# Patient Record
Sex: MALE | Race: WHITE | NOT HISPANIC OR LATINO | URBAN - METROPOLITAN AREA
[De-identification: names, ages, dates, MRNs, and addresses within clinical notes are randomized per-mention and may not be internally consistent; named-entity substitution may affect disease eponyms.]

---

## 2017-10-19 ENCOUNTER — OUTPATIENT (OUTPATIENT)
Dept: OUTPATIENT SERVICES | Facility: HOSPITAL | Age: 67
LOS: 1 days | Discharge: HOME | End: 2017-10-19

## 2017-10-19 DIAGNOSIS — L02.91 CUTANEOUS ABSCESS, UNSPECIFIED: ICD-10-CM

## 2017-10-19 DIAGNOSIS — I48.91 UNSPECIFIED ATRIAL FIBRILLATION: ICD-10-CM

## 2017-10-26 ENCOUNTER — OUTPATIENT (OUTPATIENT)
Dept: OUTPATIENT SERVICES | Facility: HOSPITAL | Age: 67
LOS: 1 days | Discharge: HOME | End: 2017-10-26

## 2017-10-26 DIAGNOSIS — H25.89 OTHER AGE-RELATED CATARACT: ICD-10-CM

## 2017-10-26 DIAGNOSIS — Z79.01 LONG TERM (CURRENT) USE OF ANTICOAGULANTS: ICD-10-CM

## 2017-10-26 DIAGNOSIS — I10 ESSENTIAL (PRIMARY) HYPERTENSION: ICD-10-CM

## 2017-10-26 DIAGNOSIS — L02.91 CUTANEOUS ABSCESS, UNSPECIFIED: ICD-10-CM

## 2017-10-26 DIAGNOSIS — E78.00 PURE HYPERCHOLESTEROLEMIA, UNSPECIFIED: ICD-10-CM

## 2017-10-26 DIAGNOSIS — I48.91 UNSPECIFIED ATRIAL FIBRILLATION: ICD-10-CM

## 2017-10-27 DIAGNOSIS — I10 ESSENTIAL (PRIMARY) HYPERTENSION: ICD-10-CM

## 2017-10-27 DIAGNOSIS — E78.00 PURE HYPERCHOLESTEROLEMIA, UNSPECIFIED: ICD-10-CM

## 2017-10-27 DIAGNOSIS — H25.89 OTHER AGE-RELATED CATARACT: ICD-10-CM

## 2018-09-05 ENCOUNTER — OUTPATIENT (OUTPATIENT)
Dept: OUTPATIENT SERVICES | Facility: HOSPITAL | Age: 68
LOS: 1 days | Discharge: HOME | End: 2018-09-05

## 2018-09-05 DIAGNOSIS — I48.91 UNSPECIFIED ATRIAL FIBRILLATION: ICD-10-CM

## 2018-09-05 DIAGNOSIS — Z79.01 LONG TERM (CURRENT) USE OF ANTICOAGULANTS: ICD-10-CM

## 2018-09-05 LAB
POCT INR: 4.4 RATIO — HIGH (ref 0.9–1.2)
POCT PT: 52.6 SEC — HIGH (ref 10–13.4)

## 2018-09-11 ENCOUNTER — OUTPATIENT (OUTPATIENT)
Dept: OUTPATIENT SERVICES | Facility: HOSPITAL | Age: 68
LOS: 1 days | Discharge: HOME | End: 2018-09-11

## 2018-09-11 DIAGNOSIS — I48.91 UNSPECIFIED ATRIAL FIBRILLATION: ICD-10-CM

## 2018-09-11 DIAGNOSIS — Z79.01 LONG TERM (CURRENT) USE OF ANTICOAGULANTS: ICD-10-CM

## 2018-09-20 ENCOUNTER — OUTPATIENT (OUTPATIENT)
Dept: OUTPATIENT SERVICES | Facility: HOSPITAL | Age: 68
LOS: 1 days | Discharge: HOME | End: 2018-09-20

## 2018-09-20 DIAGNOSIS — Z79.01 LONG TERM (CURRENT) USE OF ANTICOAGULANTS: ICD-10-CM

## 2018-09-20 DIAGNOSIS — I48.91 UNSPECIFIED ATRIAL FIBRILLATION: ICD-10-CM

## 2018-09-20 LAB
POCT INR: 2.8 RATIO — HIGH (ref 0.9–1.2)
POCT PT: 33.8 SEC — HIGH (ref 10–13.4)

## 2018-10-06 ENCOUNTER — OUTPATIENT (OUTPATIENT)
Dept: OUTPATIENT SERVICES | Facility: HOSPITAL | Age: 68
LOS: 1 days | Discharge: HOME | End: 2018-10-06

## 2018-10-06 DIAGNOSIS — Z79.01 LONG TERM (CURRENT) USE OF ANTICOAGULANTS: ICD-10-CM

## 2018-10-06 DIAGNOSIS — I48.91 UNSPECIFIED ATRIAL FIBRILLATION: ICD-10-CM

## 2018-10-06 LAB
POCT INR: 3.2 RATIO — HIGH (ref 0.9–1.2)
POCT PT: 38.3 SEC — HIGH (ref 10–13.4)

## 2018-10-12 ENCOUNTER — OUTPATIENT (OUTPATIENT)
Dept: OUTPATIENT SERVICES | Facility: HOSPITAL | Age: 68
LOS: 1 days | Discharge: HOME | End: 2018-10-12

## 2018-10-12 DIAGNOSIS — I48.91 UNSPECIFIED ATRIAL FIBRILLATION: ICD-10-CM

## 2018-10-12 DIAGNOSIS — Z79.01 LONG TERM (CURRENT) USE OF ANTICOAGULANTS: ICD-10-CM

## 2018-10-12 LAB
POCT INR: 2.8 RATIO — HIGH (ref 0.9–1.2)
POCT PT: 33.8 SEC — HIGH (ref 10–13.4)

## 2018-10-26 ENCOUNTER — OUTPATIENT (OUTPATIENT)
Dept: OUTPATIENT SERVICES | Facility: HOSPITAL | Age: 68
LOS: 1 days | Discharge: HOME | End: 2018-10-26

## 2018-10-26 DIAGNOSIS — I48.91 UNSPECIFIED ATRIAL FIBRILLATION: ICD-10-CM

## 2018-10-26 DIAGNOSIS — Z79.01 LONG TERM (CURRENT) USE OF ANTICOAGULANTS: ICD-10-CM

## 2018-10-26 LAB
POCT INR: 2.5 RATIO — HIGH (ref 0.9–1.2)
POCT PT: 30 SEC — HIGH (ref 10–13.4)

## 2018-11-16 ENCOUNTER — OUTPATIENT (OUTPATIENT)
Dept: OUTPATIENT SERVICES | Facility: HOSPITAL | Age: 68
LOS: 1 days | Discharge: HOME | End: 2018-11-16

## 2018-11-16 DIAGNOSIS — Z79.01 LONG TERM (CURRENT) USE OF ANTICOAGULANTS: ICD-10-CM

## 2018-11-16 DIAGNOSIS — I48.91 UNSPECIFIED ATRIAL FIBRILLATION: ICD-10-CM

## 2018-11-16 LAB
POCT INR: 3.2 RATIO — HIGH (ref 0.9–1.2)
POCT PT: 38.3 SEC — HIGH (ref 10–13.4)

## 2018-11-28 ENCOUNTER — OUTPATIENT (OUTPATIENT)
Dept: OUTPATIENT SERVICES | Facility: HOSPITAL | Age: 68
LOS: 1 days | Discharge: HOME | End: 2018-11-28

## 2018-11-28 DIAGNOSIS — I48.91 UNSPECIFIED ATRIAL FIBRILLATION: ICD-10-CM

## 2018-11-28 DIAGNOSIS — Z79.01 LONG TERM (CURRENT) USE OF ANTICOAGULANTS: ICD-10-CM

## 2018-11-28 LAB
POCT INR: 1.9 RATIO — HIGH (ref 0.9–1.2)
POCT PT: 22.4 SEC — HIGH (ref 10–13.4)

## 2018-12-07 ENCOUNTER — OUTPATIENT (OUTPATIENT)
Dept: OUTPATIENT SERVICES | Facility: HOSPITAL | Age: 68
LOS: 1 days | Discharge: HOME | End: 2018-12-07

## 2018-12-07 DIAGNOSIS — I48.91 UNSPECIFIED ATRIAL FIBRILLATION: ICD-10-CM

## 2018-12-07 DIAGNOSIS — Z79.01 LONG TERM (CURRENT) USE OF ANTICOAGULANTS: ICD-10-CM

## 2018-12-07 LAB
POCT INR: 2.3 RATIO — HIGH (ref 0.9–1.2)
POCT PT: 27.8 SEC — HIGH (ref 10–13.4)

## 2018-12-28 ENCOUNTER — OUTPATIENT (OUTPATIENT)
Dept: OUTPATIENT SERVICES | Facility: HOSPITAL | Age: 68
LOS: 1 days | Discharge: HOME | End: 2018-12-28

## 2018-12-28 DIAGNOSIS — I48.91 UNSPECIFIED ATRIAL FIBRILLATION: ICD-10-CM

## 2018-12-28 DIAGNOSIS — Z79.01 LONG TERM (CURRENT) USE OF ANTICOAGULANTS: ICD-10-CM

## 2018-12-28 LAB
POCT INR: 2.4 RATIO — HIGH (ref 0.9–1.2)
POCT PT: 28.2 SEC — HIGH (ref 10–13.4)

## 2019-01-28 ENCOUNTER — OUTPATIENT (OUTPATIENT)
Dept: OUTPATIENT SERVICES | Facility: HOSPITAL | Age: 69
LOS: 1 days | Discharge: HOME | End: 2019-01-28

## 2019-01-28 DIAGNOSIS — I48.91 UNSPECIFIED ATRIAL FIBRILLATION: ICD-10-CM

## 2019-01-28 DIAGNOSIS — Z79.01 LONG TERM (CURRENT) USE OF ANTICOAGULANTS: ICD-10-CM

## 2019-01-28 LAB
POCT INR: 1.8 RATIO — HIGH (ref 0.9–1.2)
POCT PT: 20 SEC — HIGH (ref 10–13.4)

## 2019-02-08 ENCOUNTER — OUTPATIENT (OUTPATIENT)
Dept: OUTPATIENT SERVICES | Facility: HOSPITAL | Age: 69
LOS: 1 days | Discharge: HOME | End: 2019-02-08

## 2019-02-08 DIAGNOSIS — Z79.01 LONG TERM (CURRENT) USE OF ANTICOAGULANTS: ICD-10-CM

## 2019-02-08 DIAGNOSIS — I48.91 UNSPECIFIED ATRIAL FIBRILLATION: ICD-10-CM

## 2019-02-08 LAB
POCT INR: 1.6 RATIO — HIGH (ref 0.9–1.2)
POCT PT: 19.4 SEC — HIGH (ref 10–13.4)

## 2019-02-15 ENCOUNTER — OUTPATIENT (OUTPATIENT)
Dept: OUTPATIENT SERVICES | Facility: HOSPITAL | Age: 69
LOS: 1 days | Discharge: HOME | End: 2019-02-15

## 2019-02-15 DIAGNOSIS — I48.91 UNSPECIFIED ATRIAL FIBRILLATION: ICD-10-CM

## 2019-02-15 DIAGNOSIS — Z79.01 LONG TERM (CURRENT) USE OF ANTICOAGULANTS: ICD-10-CM

## 2019-02-15 LAB
POCT INR: 2.2 RATIO — HIGH (ref 0.9–1.2)
POCT PT: 26.4 SEC — HIGH (ref 10–13.4)

## 2019-03-01 ENCOUNTER — OUTPATIENT (OUTPATIENT)
Dept: OUTPATIENT SERVICES | Facility: HOSPITAL | Age: 69
LOS: 1 days | Discharge: HOME | End: 2019-03-01

## 2019-03-01 DIAGNOSIS — I48.91 UNSPECIFIED ATRIAL FIBRILLATION: ICD-10-CM

## 2019-03-01 DIAGNOSIS — Z79.01 LONG TERM (CURRENT) USE OF ANTICOAGULANTS: ICD-10-CM

## 2019-03-01 LAB
POCT INR: 2.1 RATIO — HIGH (ref 0.9–1.2)
POCT PT: 25.1 SEC — HIGH (ref 10–13.4)

## 2019-03-29 ENCOUNTER — OUTPATIENT (OUTPATIENT)
Dept: OUTPATIENT SERVICES | Facility: HOSPITAL | Age: 69
LOS: 1 days | Discharge: HOME | End: 2019-03-29

## 2019-03-29 DIAGNOSIS — I48.91 UNSPECIFIED ATRIAL FIBRILLATION: ICD-10-CM

## 2019-03-29 DIAGNOSIS — Z79.01 LONG TERM (CURRENT) USE OF ANTICOAGULANTS: ICD-10-CM

## 2019-03-29 LAB
POCT INR: 2.7 RATIO — HIGH (ref 0.9–1.2)
POCT PT: 33 SEC — HIGH (ref 10–13.4)

## 2019-04-26 ENCOUNTER — OUTPATIENT (OUTPATIENT)
Dept: OUTPATIENT SERVICES | Facility: HOSPITAL | Age: 69
LOS: 1 days | Discharge: HOME | End: 2019-04-26

## 2019-04-26 DIAGNOSIS — I48.91 UNSPECIFIED ATRIAL FIBRILLATION: ICD-10-CM

## 2019-04-26 DIAGNOSIS — Z79.01 LONG TERM (CURRENT) USE OF ANTICOAGULANTS: ICD-10-CM

## 2019-04-26 LAB
POCT INR: 2.4 RATIO — HIGH (ref 0.9–1.2)
POCT PT: 29.2 SEC — HIGH (ref 10–13.4)

## 2019-05-24 ENCOUNTER — OUTPATIENT (OUTPATIENT)
Dept: OUTPATIENT SERVICES | Facility: HOSPITAL | Age: 69
LOS: 1 days | Discharge: HOME | End: 2019-05-24

## 2019-05-24 DIAGNOSIS — I48.91 UNSPECIFIED ATRIAL FIBRILLATION: ICD-10-CM

## 2019-05-24 DIAGNOSIS — Z79.01 LONG TERM (CURRENT) USE OF ANTICOAGULANTS: ICD-10-CM

## 2019-05-24 LAB
POCT INR: 2.5 RATIO — HIGH (ref 0.9–1.2)
POCT PT: 29.5 SEC — HIGH (ref 10–13.4)

## 2019-06-21 ENCOUNTER — OUTPATIENT (OUTPATIENT)
Dept: OUTPATIENT SERVICES | Facility: HOSPITAL | Age: 69
LOS: 1 days | Discharge: HOME | End: 2019-06-21

## 2019-06-21 DIAGNOSIS — Z79.01 LONG TERM (CURRENT) USE OF ANTICOAGULANTS: ICD-10-CM

## 2019-06-21 DIAGNOSIS — I48.91 UNSPECIFIED ATRIAL FIBRILLATION: ICD-10-CM

## 2019-06-21 LAB
POCT INR: 3.4 RATIO — HIGH (ref 0.9–1.2)
POCT PT: 43.1 SEC — HIGH (ref 10–13.4)

## 2019-07-05 ENCOUNTER — OUTPATIENT (OUTPATIENT)
Dept: OUTPATIENT SERVICES | Facility: HOSPITAL | Age: 69
LOS: 1 days | Discharge: HOME | End: 2019-07-05

## 2019-07-05 DIAGNOSIS — I48.91 UNSPECIFIED ATRIAL FIBRILLATION: ICD-10-CM

## 2019-07-05 DIAGNOSIS — Z79.01 LONG TERM (CURRENT) USE OF ANTICOAGULANTS: ICD-10-CM

## 2019-07-05 LAB
POCT INR: 3.5 RATIO — HIGH (ref 0.9–1.2)
POCT PT: 41.5 SEC — HIGH (ref 10–13.4)

## 2019-07-19 ENCOUNTER — OUTPATIENT (OUTPATIENT)
Dept: OUTPATIENT SERVICES | Facility: HOSPITAL | Age: 69
LOS: 1 days | Discharge: HOME | End: 2019-07-19

## 2019-07-19 DIAGNOSIS — I48.91 UNSPECIFIED ATRIAL FIBRILLATION: ICD-10-CM

## 2019-07-19 DIAGNOSIS — Z79.01 LONG TERM (CURRENT) USE OF ANTICOAGULANTS: ICD-10-CM

## 2019-07-19 LAB
POCT INR: 2 RATIO — HIGH (ref 0.9–1.2)
POCT PT: 24 SEC — HIGH (ref 10–13.4)

## 2019-08-02 ENCOUNTER — OUTPATIENT (OUTPATIENT)
Dept: OUTPATIENT SERVICES | Facility: HOSPITAL | Age: 69
LOS: 1 days | Discharge: HOME | End: 2019-08-02

## 2019-08-02 DIAGNOSIS — I48.91 UNSPECIFIED ATRIAL FIBRILLATION: ICD-10-CM

## 2019-08-02 DIAGNOSIS — Z79.01 LONG TERM (CURRENT) USE OF ANTICOAGULANTS: ICD-10-CM

## 2019-08-02 LAB
POCT INR: 2.2 RATIO — HIGH (ref 0.9–1.2)
POCT PT: 26.5 SEC — HIGH (ref 10–13.4)

## 2019-08-30 ENCOUNTER — OUTPATIENT (OUTPATIENT)
Dept: OUTPATIENT SERVICES | Facility: HOSPITAL | Age: 69
LOS: 1 days | Discharge: HOME | End: 2019-08-30

## 2019-08-30 DIAGNOSIS — I48.91 UNSPECIFIED ATRIAL FIBRILLATION: ICD-10-CM

## 2019-08-30 DIAGNOSIS — Z79.01 LONG TERM (CURRENT) USE OF ANTICOAGULANTS: ICD-10-CM

## 2019-08-30 LAB
POCT INR: 2 RATIO — HIGH (ref 0.9–1.2)
POCT PT: 23.6 SEC — HIGH (ref 10–13.4)

## 2019-09-27 ENCOUNTER — OUTPATIENT (OUTPATIENT)
Dept: OUTPATIENT SERVICES | Facility: HOSPITAL | Age: 69
LOS: 1 days | Discharge: HOME | End: 2019-09-27

## 2019-09-27 DIAGNOSIS — I48.91 UNSPECIFIED ATRIAL FIBRILLATION: ICD-10-CM

## 2019-09-27 DIAGNOSIS — Z79.01 LONG TERM (CURRENT) USE OF ANTICOAGULANTS: ICD-10-CM

## 2019-09-27 LAB
POCT INR: 3 RATIO — HIGH (ref 0.9–1.2)
POCT PT: 36.6 SEC — HIGH (ref 10–13.4)

## 2019-10-25 ENCOUNTER — OUTPATIENT (OUTPATIENT)
Dept: OUTPATIENT SERVICES | Facility: HOSPITAL | Age: 69
LOS: 1 days | Discharge: HOME | End: 2019-10-25

## 2019-10-25 DIAGNOSIS — I48.91 UNSPECIFIED ATRIAL FIBRILLATION: ICD-10-CM

## 2019-10-25 DIAGNOSIS — Z79.01 LONG TERM (CURRENT) USE OF ANTICOAGULANTS: ICD-10-CM

## 2019-10-25 LAB
POCT INR: 2.2 RATIO — HIGH (ref 0.9–1.2)
POCT PT: 26.1 SEC — HIGH (ref 10–13.4)

## 2019-11-22 ENCOUNTER — OUTPATIENT (OUTPATIENT)
Dept: OUTPATIENT SERVICES | Facility: HOSPITAL | Age: 69
LOS: 1 days | Discharge: HOME | End: 2019-11-22

## 2019-11-22 DIAGNOSIS — Z79.01 LONG TERM (CURRENT) USE OF ANTICOAGULANTS: ICD-10-CM

## 2019-11-22 DIAGNOSIS — I48.91 UNSPECIFIED ATRIAL FIBRILLATION: ICD-10-CM

## 2019-11-25 LAB
POCT INR: 2.6 RATIO — HIGH (ref 0.9–1.2)
POCT PT: 30.2 SEC — HIGH (ref 10–13.4)

## 2019-12-20 ENCOUNTER — OUTPATIENT (OUTPATIENT)
Dept: OUTPATIENT SERVICES | Facility: HOSPITAL | Age: 69
LOS: 1 days | Discharge: HOME | End: 2019-12-20

## 2019-12-20 DIAGNOSIS — I48.91 UNSPECIFIED ATRIAL FIBRILLATION: ICD-10-CM

## 2019-12-20 DIAGNOSIS — Z79.01 LONG TERM (CURRENT) USE OF ANTICOAGULANTS: ICD-10-CM

## 2019-12-20 LAB
POCT INR: 2.5 RATIO — HIGH (ref 0.9–1.2)
POCT PT: 29.5 SEC — HIGH (ref 10–13.4)

## 2020-01-17 ENCOUNTER — OUTPATIENT (OUTPATIENT)
Dept: OUTPATIENT SERVICES | Facility: HOSPITAL | Age: 70
LOS: 1 days | Discharge: HOME | End: 2020-01-17

## 2020-01-17 DIAGNOSIS — Z79.01 LONG TERM (CURRENT) USE OF ANTICOAGULANTS: ICD-10-CM

## 2020-01-17 DIAGNOSIS — I48.91 UNSPECIFIED ATRIAL FIBRILLATION: ICD-10-CM

## 2020-01-17 LAB
POCT INR: 2.3 RATIO — HIGH (ref 0.9–1.2)
POCT PT: 27.3 SEC — HIGH (ref 10–13.4)

## 2020-02-14 ENCOUNTER — OUTPATIENT (OUTPATIENT)
Dept: OUTPATIENT SERVICES | Facility: HOSPITAL | Age: 70
LOS: 1 days | Discharge: HOME | End: 2020-02-14

## 2020-02-14 DIAGNOSIS — Z79.01 LONG TERM (CURRENT) USE OF ANTICOAGULANTS: ICD-10-CM

## 2020-02-14 DIAGNOSIS — I48.91 UNSPECIFIED ATRIAL FIBRILLATION: ICD-10-CM

## 2020-02-14 LAB
POCT INR: 2.5 RATIO — HIGH (ref 0.9–1.2)
POCT PT: 30.1 SEC — HIGH (ref 10–13.4)

## 2020-03-20 ENCOUNTER — OUTPATIENT (OUTPATIENT)
Dept: OUTPATIENT SERVICES | Facility: HOSPITAL | Age: 70
LOS: 1 days | Discharge: HOME | End: 2020-03-20

## 2020-03-20 DIAGNOSIS — Z79.01 LONG TERM (CURRENT) USE OF ANTICOAGULANTS: ICD-10-CM

## 2020-03-20 DIAGNOSIS — I48.91 UNSPECIFIED ATRIAL FIBRILLATION: ICD-10-CM

## 2020-03-20 LAB
POCT INR: 3 RATIO — HIGH (ref 0.9–1.2)
POCT PT: 35.6 SEC — HIGH (ref 10–13.4)

## 2020-04-24 ENCOUNTER — OUTPATIENT (OUTPATIENT)
Dept: OUTPATIENT SERVICES | Facility: HOSPITAL | Age: 70
LOS: 1 days | Discharge: HOME | End: 2020-04-24

## 2020-04-24 DIAGNOSIS — Z79.01 LONG TERM (CURRENT) USE OF ANTICOAGULANTS: ICD-10-CM

## 2020-04-24 DIAGNOSIS — I48.91 UNSPECIFIED ATRIAL FIBRILLATION: ICD-10-CM

## 2020-04-24 LAB
POCT INR: 2.5 RATIO — HIGH (ref 0.9–1.2)
POCT PT: 29.9 SEC — HIGH (ref 10–13.4)

## 2020-05-29 ENCOUNTER — OUTPATIENT (OUTPATIENT)
Dept: OUTPATIENT SERVICES | Facility: HOSPITAL | Age: 70
LOS: 1 days | Discharge: HOME | End: 2020-05-29

## 2020-05-29 DIAGNOSIS — Z79.01 LONG TERM (CURRENT) USE OF ANTICOAGULANTS: ICD-10-CM

## 2020-05-29 DIAGNOSIS — I48.91 UNSPECIFIED ATRIAL FIBRILLATION: ICD-10-CM

## 2020-05-29 LAB
POCT INR: 2.1 RATIO — HIGH (ref 0.9–1.2)
POCT PT: 25.6 SEC — HIGH (ref 10–13.4)

## 2020-06-26 ENCOUNTER — OUTPATIENT (OUTPATIENT)
Dept: OUTPATIENT SERVICES | Facility: HOSPITAL | Age: 70
LOS: 1 days | Discharge: HOME | End: 2020-06-26

## 2020-06-26 DIAGNOSIS — Z79.01 LONG TERM (CURRENT) USE OF ANTICOAGULANTS: ICD-10-CM

## 2020-06-26 DIAGNOSIS — I48.91 UNSPECIFIED ATRIAL FIBRILLATION: ICD-10-CM

## 2020-06-26 LAB
POCT INR: 2.1 RATIO — HIGH (ref 0.9–1.2)
POCT PT: 25.8 SEC — HIGH (ref 10–13.4)

## 2020-07-31 ENCOUNTER — OUTPATIENT (OUTPATIENT)
Dept: OUTPATIENT SERVICES | Facility: HOSPITAL | Age: 70
LOS: 1 days | Discharge: HOME | End: 2020-07-31

## 2020-07-31 DIAGNOSIS — I48.91 UNSPECIFIED ATRIAL FIBRILLATION: ICD-10-CM

## 2020-07-31 DIAGNOSIS — Z79.01 LONG TERM (CURRENT) USE OF ANTICOAGULANTS: ICD-10-CM

## 2020-07-31 LAB
POCT INR: 2.3 RATIO — HIGH (ref 0.9–1.2)
POCT PT: 27.3 SEC — HIGH (ref 10–13.4)

## 2020-08-07 ENCOUNTER — RECORD ABSTRACTING (OUTPATIENT)
Age: 70
End: 2020-08-07

## 2020-08-07 DIAGNOSIS — Z79.01 LONG TERM (CURRENT) USE OF ANTICOAGULANTS: ICD-10-CM

## 2020-08-07 DIAGNOSIS — I10 ESSENTIAL (PRIMARY) HYPERTENSION: ICD-10-CM

## 2020-08-07 DIAGNOSIS — Z87.19 PERSONAL HISTORY OF OTHER DISEASES OF THE DIGESTIVE SYSTEM: ICD-10-CM

## 2020-08-07 DIAGNOSIS — E78.00 PURE HYPERCHOLESTEROLEMIA, UNSPECIFIED: ICD-10-CM

## 2020-08-07 DIAGNOSIS — I48.91 UNSPECIFIED ATRIAL FIBRILLATION: ICD-10-CM

## 2020-08-07 DIAGNOSIS — Z86.79 PERSONAL HISTORY OF OTHER DISEASES OF THE CIRCULATORY SYSTEM: ICD-10-CM

## 2020-08-07 PROBLEM — Z00.00 ENCOUNTER FOR PREVENTIVE HEALTH EXAMINATION: Status: ACTIVE | Noted: 2020-08-07

## 2020-08-07 RX ORDER — METOPROLOL TARTRATE 100 MG/1
100 TABLET, FILM COATED ORAL
Refills: 0 | Status: ACTIVE | COMMUNITY

## 2020-08-07 RX ORDER — EZETIMIBE 10 MG/1
10 TABLET ORAL DAILY
Refills: 0 | Status: ACTIVE | COMMUNITY

## 2020-08-07 RX ORDER — SPIRONOLACTONE 50 MG/1
TABLET ORAL DAILY
Refills: 0 | Status: ACTIVE | COMMUNITY

## 2020-08-07 RX ORDER — WARFARIN 5 MG/1
5 TABLET ORAL
Refills: 0 | Status: ACTIVE | COMMUNITY

## 2020-08-07 RX ORDER — ASPIRIN 81 MG
81 TABLET, DELAYED RELEASE (ENTERIC COATED) ORAL
Refills: 0 | Status: ACTIVE | COMMUNITY

## 2020-08-07 RX ORDER — SACUBITRIL AND VALSARTAN 49; 51 MG/1; MG/1
TABLET, FILM COATED ORAL
Refills: 0 | Status: ACTIVE | COMMUNITY

## 2020-08-07 RX ORDER — ROSUVASTATIN CALCIUM 40 MG/1
40 TABLET, FILM COATED ORAL
Refills: 0 | Status: ACTIVE | COMMUNITY

## 2020-09-04 ENCOUNTER — OUTPATIENT (OUTPATIENT)
Dept: OUTPATIENT SERVICES | Facility: HOSPITAL | Age: 70
LOS: 1 days | Discharge: HOME | End: 2020-09-04

## 2020-09-04 ENCOUNTER — APPOINTMENT (OUTPATIENT)
Dept: MEDICATION MANAGEMENT | Facility: CLINIC | Age: 70
End: 2020-09-04

## 2020-09-04 VITALS — BODY MASS INDEX: 30.09 KG/M2 | HEIGHT: 75 IN | RESPIRATION RATE: 16 BRPM | WEIGHT: 242 LBS

## 2020-09-04 DIAGNOSIS — Z79.01 LONG TERM (CURRENT) USE OF ANTICOAGULANTS: ICD-10-CM

## 2020-09-04 DIAGNOSIS — Z51.81 ENCOUNTER FOR THERAPEUTIC DRUG LVL MONITORING: ICD-10-CM

## 2020-09-04 DIAGNOSIS — I48.91 UNSPECIFIED ATRIAL FIBRILLATION: ICD-10-CM

## 2020-09-04 DIAGNOSIS — Z79.01 ENCOUNTER FOR THERAPEUTIC DRUG LVL MONITORING: ICD-10-CM

## 2020-09-04 LAB
INR PPP: 1.8 RATIO
POCT-PROTHROMBIN TIME: 30 SECS
QUALITY CONTROL: YES

## 2020-10-09 ENCOUNTER — OUTPATIENT (OUTPATIENT)
Dept: OUTPATIENT SERVICES | Facility: HOSPITAL | Age: 70
LOS: 1 days | Discharge: HOME | End: 2020-10-09

## 2020-10-09 ENCOUNTER — APPOINTMENT (OUTPATIENT)
Dept: MEDICATION MANAGEMENT | Facility: CLINIC | Age: 70
End: 2020-10-09

## 2020-10-09 VITALS — HEIGHT: 75 IN | TEMPERATURE: 97 F | BODY MASS INDEX: 29.84 KG/M2 | WEIGHT: 240 LBS

## 2020-10-09 DIAGNOSIS — I48.91 UNSPECIFIED ATRIAL FIBRILLATION: ICD-10-CM

## 2020-10-09 DIAGNOSIS — Z79.01 LONG TERM (CURRENT) USE OF ANTICOAGULANTS: ICD-10-CM

## 2020-10-09 LAB
INR PPP: 2.8 RATIO
POCT-PROTHROMBIN TIME: 33.8 SECS
QUALITY CONTROL: YES

## 2020-11-13 ENCOUNTER — OUTPATIENT (OUTPATIENT)
Dept: OUTPATIENT SERVICES | Facility: HOSPITAL | Age: 70
LOS: 1 days | Discharge: HOME | End: 2020-11-13

## 2020-11-13 ENCOUNTER — APPOINTMENT (OUTPATIENT)
Dept: MEDICATION MANAGEMENT | Facility: CLINIC | Age: 70
End: 2020-11-13

## 2020-11-13 DIAGNOSIS — I48.91 UNSPECIFIED ATRIAL FIBRILLATION: ICD-10-CM

## 2020-11-13 DIAGNOSIS — Z79.01 LONG TERM (CURRENT) USE OF ANTICOAGULANTS: ICD-10-CM

## 2020-11-13 LAB
INR PPP: 2.3 RATIO
POCT-PROTHROMBIN TIME: 27.3 SECS
QUALITY CONTROL: YES

## 2020-12-18 ENCOUNTER — OUTPATIENT (OUTPATIENT)
Dept: OUTPATIENT SERVICES | Facility: HOSPITAL | Age: 70
LOS: 1 days | Discharge: HOME | End: 2020-12-18

## 2020-12-18 ENCOUNTER — APPOINTMENT (OUTPATIENT)
Dept: MEDICATION MANAGEMENT | Facility: CLINIC | Age: 70
End: 2020-12-18

## 2020-12-18 DIAGNOSIS — I48.91 UNSPECIFIED ATRIAL FIBRILLATION: ICD-10-CM

## 2020-12-18 DIAGNOSIS — Z79.01 LONG TERM (CURRENT) USE OF ANTICOAGULANTS: ICD-10-CM

## 2020-12-18 LAB
INR PPP: 2.4 RATIO
POCT-PROTHROMBIN TIME: 28.1 SECS
QUALITY CONTROL: YES

## 2021-01-22 ENCOUNTER — APPOINTMENT (OUTPATIENT)
Dept: MEDICATION MANAGEMENT | Facility: CLINIC | Age: 71
End: 2021-01-22

## 2021-01-22 ENCOUNTER — OUTPATIENT (OUTPATIENT)
Dept: OUTPATIENT SERVICES | Facility: HOSPITAL | Age: 71
LOS: 1 days | Discharge: HOME | End: 2021-01-22

## 2021-01-22 DIAGNOSIS — Z79.01 LONG TERM (CURRENT) USE OF ANTICOAGULANTS: ICD-10-CM

## 2021-01-22 DIAGNOSIS — I48.91 UNSPECIFIED ATRIAL FIBRILLATION: ICD-10-CM

## 2021-01-22 LAB
INR PPP: 2 RATIO
POCT-PROTHROMBIN TIME: 25 SECS
QUALITY CONTROL: YES

## 2021-02-26 ENCOUNTER — APPOINTMENT (OUTPATIENT)
Dept: MEDICATION MANAGEMENT | Facility: CLINIC | Age: 71
End: 2021-02-26

## 2021-02-26 ENCOUNTER — OUTPATIENT (OUTPATIENT)
Dept: OUTPATIENT SERVICES | Facility: HOSPITAL | Age: 71
LOS: 1 days | Discharge: HOME | End: 2021-02-26

## 2021-02-26 DIAGNOSIS — Z79.01 LONG TERM (CURRENT) USE OF ANTICOAGULANTS: ICD-10-CM

## 2021-02-26 DIAGNOSIS — I48.91 UNSPECIFIED ATRIAL FIBRILLATION: ICD-10-CM

## 2021-02-26 LAB
INR PPP: 2.4 RATIO
POCT-PROTHROMBIN TIME: 39.8 SECS
QUALITY CONTROL: YES

## 2021-04-02 ENCOUNTER — OUTPATIENT (OUTPATIENT)
Dept: OUTPATIENT SERVICES | Facility: HOSPITAL | Age: 71
LOS: 1 days | Discharge: HOME | End: 2021-04-02

## 2021-04-02 ENCOUNTER — APPOINTMENT (OUTPATIENT)
Dept: MEDICATION MANAGEMENT | Facility: CLINIC | Age: 71
End: 2021-04-02

## 2021-04-02 VITALS — OXYGEN SATURATION: 98 % | HEART RATE: 82 BPM | RESPIRATION RATE: 16 BRPM

## 2021-04-02 DIAGNOSIS — I48.91 UNSPECIFIED ATRIAL FIBRILLATION: ICD-10-CM

## 2021-04-02 DIAGNOSIS — Z79.01 LONG TERM (CURRENT) USE OF ANTICOAGULANTS: ICD-10-CM

## 2021-04-02 LAB
INR PPP: 2.2 RATIO
POCT-PROTHROMBIN TIME: 26.1 SECS
QUALITY CONTROL: YES

## 2021-05-06 ENCOUNTER — OUTPATIENT (OUTPATIENT)
Dept: OUTPATIENT SERVICES | Facility: HOSPITAL | Age: 71
LOS: 1 days | Discharge: HOME | End: 2021-05-06

## 2021-05-06 ENCOUNTER — APPOINTMENT (OUTPATIENT)
Dept: MEDICATION MANAGEMENT | Facility: CLINIC | Age: 71
End: 2021-05-06

## 2021-05-06 DIAGNOSIS — I48.91 UNSPECIFIED ATRIAL FIBRILLATION: ICD-10-CM

## 2021-05-06 DIAGNOSIS — Z79.01 LONG TERM (CURRENT) USE OF ANTICOAGULANTS: ICD-10-CM

## 2021-05-06 LAB
INR PPP: 2.1 RATIO
POCT-PROTHROMBIN TIME: 25.5 SECS
QUALITY CONTROL: YES

## 2021-06-11 ENCOUNTER — OUTPATIENT (OUTPATIENT)
Dept: OUTPATIENT SERVICES | Facility: HOSPITAL | Age: 71
LOS: 1 days | Discharge: HOME | End: 2021-06-11

## 2021-06-11 ENCOUNTER — APPOINTMENT (OUTPATIENT)
Dept: MEDICATION MANAGEMENT | Facility: CLINIC | Age: 71
End: 2021-06-11

## 2021-06-11 DIAGNOSIS — I48.91 UNSPECIFIED ATRIAL FIBRILLATION: ICD-10-CM

## 2021-06-11 DIAGNOSIS — Z79.01 LONG TERM (CURRENT) USE OF ANTICOAGULANTS: ICD-10-CM

## 2021-06-11 LAB
INR PPP: 2 RATIO
POCT-PROTHROMBIN TIME: 23.1 SECS
QUALITY CONTROL: YES

## 2021-07-16 ENCOUNTER — APPOINTMENT (OUTPATIENT)
Dept: MEDICATION MANAGEMENT | Facility: CLINIC | Age: 71
End: 2021-07-16

## 2021-07-16 ENCOUNTER — OUTPATIENT (OUTPATIENT)
Dept: OUTPATIENT SERVICES | Facility: HOSPITAL | Age: 71
LOS: 1 days | Discharge: HOME | End: 2021-07-16

## 2021-07-16 DIAGNOSIS — I48.91 UNSPECIFIED ATRIAL FIBRILLATION: ICD-10-CM

## 2021-07-16 DIAGNOSIS — Z79.01 LONG TERM (CURRENT) USE OF ANTICOAGULANTS: ICD-10-CM

## 2021-07-16 LAB
INR PPP: 1.8 RATIO
POCT-PROTHROMBIN TIME: 22.1 SECS
QUALITY CONTROL: YES

## 2021-07-30 ENCOUNTER — APPOINTMENT (OUTPATIENT)
Dept: MEDICATION MANAGEMENT | Facility: CLINIC | Age: 71
End: 2021-07-30

## 2021-07-30 ENCOUNTER — OUTPATIENT (OUTPATIENT)
Dept: OUTPATIENT SERVICES | Facility: HOSPITAL | Age: 71
LOS: 1 days | Discharge: HOME | End: 2021-07-30

## 2021-07-30 DIAGNOSIS — Z79.01 LONG TERM (CURRENT) USE OF ANTICOAGULANTS: ICD-10-CM

## 2021-07-30 DIAGNOSIS — I48.91 UNSPECIFIED ATRIAL FIBRILLATION: ICD-10-CM

## 2021-07-30 LAB
INR PPP: 2.2 RATIO
POCT-PROTHROMBIN TIME: 26.3 SECS
QUALITY CONTROL: YES

## 2021-08-20 ENCOUNTER — APPOINTMENT (OUTPATIENT)
Dept: MEDICATION MANAGEMENT | Facility: CLINIC | Age: 71
End: 2021-08-20

## 2021-08-20 ENCOUNTER — OUTPATIENT (OUTPATIENT)
Dept: OUTPATIENT SERVICES | Facility: HOSPITAL | Age: 71
LOS: 1 days | Discharge: HOME | End: 2021-08-20

## 2021-08-20 DIAGNOSIS — I48.91 UNSPECIFIED ATRIAL FIBRILLATION: ICD-10-CM

## 2021-08-20 DIAGNOSIS — Z79.01 LONG TERM (CURRENT) USE OF ANTICOAGULANTS: ICD-10-CM

## 2021-08-20 LAB
INR PPP: 2 RATIO
POCT-PROTHROMBIN TIME: 24 SECS
QUALITY CONTROL: YES

## 2021-09-10 ENCOUNTER — APPOINTMENT (OUTPATIENT)
Dept: MEDICATION MANAGEMENT | Facility: CLINIC | Age: 71
End: 2021-09-10

## 2021-09-10 ENCOUNTER — OUTPATIENT (OUTPATIENT)
Dept: OUTPATIENT SERVICES | Facility: HOSPITAL | Age: 71
LOS: 1 days | Discharge: HOME | End: 2021-09-10

## 2021-09-10 DIAGNOSIS — I48.91 UNSPECIFIED ATRIAL FIBRILLATION: ICD-10-CM

## 2021-09-10 DIAGNOSIS — Z79.01 LONG TERM (CURRENT) USE OF ANTICOAGULANTS: ICD-10-CM

## 2021-09-10 LAB
INR PPP: 2.6 RATIO
POCT-PROTHROMBIN TIME: 31.3 SECS
QUALITY CONTROL: YES

## 2021-09-24 ENCOUNTER — APPOINTMENT (OUTPATIENT)
Dept: MEDICATION MANAGEMENT | Facility: CLINIC | Age: 71
End: 2021-09-24

## 2021-09-24 ENCOUNTER — OUTPATIENT (OUTPATIENT)
Dept: OUTPATIENT SERVICES | Facility: HOSPITAL | Age: 71
LOS: 1 days | Discharge: HOME | End: 2021-09-24

## 2021-09-24 DIAGNOSIS — Z79.01 LONG TERM (CURRENT) USE OF ANTICOAGULANTS: ICD-10-CM

## 2021-09-24 DIAGNOSIS — I48.91 UNSPECIFIED ATRIAL FIBRILLATION: ICD-10-CM

## 2021-09-24 LAB
INR PPP: 2.5 RATIO
POCT-PROTHROMBIN TIME: 30.3 SECS
QUALITY CONTROL: YES

## 2021-10-22 ENCOUNTER — OUTPATIENT (OUTPATIENT)
Dept: OUTPATIENT SERVICES | Facility: HOSPITAL | Age: 71
LOS: 1 days | Discharge: HOME | End: 2021-10-22

## 2021-10-22 ENCOUNTER — APPOINTMENT (OUTPATIENT)
Dept: MEDICATION MANAGEMENT | Facility: CLINIC | Age: 71
End: 2021-10-22

## 2021-10-22 DIAGNOSIS — Z79.01 LONG TERM (CURRENT) USE OF ANTICOAGULANTS: ICD-10-CM

## 2021-10-22 DIAGNOSIS — I48.91 UNSPECIFIED ATRIAL FIBRILLATION: ICD-10-CM

## 2021-10-22 LAB
INR PPP: 2.2 RATIO
POCT-PROTHROMBIN TIME: 26.2 SECS
QUALITY CONTROL: YES

## 2021-11-18 ENCOUNTER — APPOINTMENT (OUTPATIENT)
Dept: MEDICATION MANAGEMENT | Facility: CLINIC | Age: 71
End: 2021-11-18

## 2021-11-18 ENCOUNTER — OUTPATIENT (OUTPATIENT)
Dept: OUTPATIENT SERVICES | Facility: HOSPITAL | Age: 71
LOS: 1 days | Discharge: HOME | End: 2021-11-18

## 2021-11-18 DIAGNOSIS — Z79.01 LONG TERM (CURRENT) USE OF ANTICOAGULANTS: ICD-10-CM

## 2021-11-18 DIAGNOSIS — I48.91 UNSPECIFIED ATRIAL FIBRILLATION: ICD-10-CM

## 2021-11-18 LAB
INR PPP: 2 RATIO
POCT-PROTHROMBIN TIME: 23.5 SECS
QUALITY CONTROL: YES

## 2021-12-17 ENCOUNTER — APPOINTMENT (OUTPATIENT)
Dept: MEDICATION MANAGEMENT | Facility: CLINIC | Age: 71
End: 2021-12-17

## 2021-12-17 ENCOUNTER — OUTPATIENT (OUTPATIENT)
Dept: OUTPATIENT SERVICES | Facility: HOSPITAL | Age: 71
LOS: 1 days | Discharge: HOME | End: 2021-12-17

## 2021-12-17 DIAGNOSIS — I48.91 UNSPECIFIED ATRIAL FIBRILLATION: ICD-10-CM

## 2021-12-17 DIAGNOSIS — Z79.01 LONG TERM (CURRENT) USE OF ANTICOAGULANTS: ICD-10-CM

## 2021-12-17 LAB
INR PPP: 2 RATIO
POCT-PROTHROMBIN TIME: 24.4 SECS
QUALITY CONTROL: YES

## 2022-01-14 ENCOUNTER — APPOINTMENT (OUTPATIENT)
Dept: MEDICATION MANAGEMENT | Facility: CLINIC | Age: 72
End: 2022-01-14

## 2022-01-14 ENCOUNTER — OUTPATIENT (OUTPATIENT)
Dept: OUTPATIENT SERVICES | Facility: HOSPITAL | Age: 72
LOS: 1 days | Discharge: HOME | End: 2022-01-14

## 2022-01-14 DIAGNOSIS — Z79.01 LONG TERM (CURRENT) USE OF ANTICOAGULANTS: ICD-10-CM

## 2022-01-14 DIAGNOSIS — I48.91 UNSPECIFIED ATRIAL FIBRILLATION: ICD-10-CM

## 2022-01-14 LAB
INR PPP: 2.2 RATIO
POCT-PROTHROMBIN TIME: 27 SECS
QUALITY CONTROL: YES

## 2022-01-18 ENCOUNTER — RX RENEWAL (OUTPATIENT)
Age: 72
End: 2022-01-18

## 2022-02-18 ENCOUNTER — APPOINTMENT (OUTPATIENT)
Dept: MEDICATION MANAGEMENT | Facility: CLINIC | Age: 72
End: 2022-02-18

## 2022-02-18 ENCOUNTER — OUTPATIENT (OUTPATIENT)
Dept: OUTPATIENT SERVICES | Facility: HOSPITAL | Age: 72
LOS: 1 days | Discharge: HOME | End: 2022-02-18

## 2022-02-18 DIAGNOSIS — I48.91 UNSPECIFIED ATRIAL FIBRILLATION: ICD-10-CM

## 2022-02-18 DIAGNOSIS — Z79.01 LONG TERM (CURRENT) USE OF ANTICOAGULANTS: ICD-10-CM

## 2022-02-18 LAB
INR PPP: 2.2 RATIO
POCT-PROTHROMBIN TIME: 26.8 SECS
QUALITY CONTROL: YES

## 2022-03-18 ENCOUNTER — OUTPATIENT (OUTPATIENT)
Dept: OUTPATIENT SERVICES | Facility: HOSPITAL | Age: 72
LOS: 1 days | Discharge: HOME | End: 2022-03-18

## 2022-03-18 ENCOUNTER — APPOINTMENT (OUTPATIENT)
Dept: MEDICATION MANAGEMENT | Facility: CLINIC | Age: 72
End: 2022-03-18

## 2022-03-18 DIAGNOSIS — Z79.01 LONG TERM (CURRENT) USE OF ANTICOAGULANTS: ICD-10-CM

## 2022-03-18 DIAGNOSIS — I48.91 UNSPECIFIED ATRIAL FIBRILLATION: ICD-10-CM

## 2022-03-18 LAB
INR PPP: 2.5 RATIO
POCT-PROTHROMBIN TIME: 30.3 SECS
QUALITY CONTROL: YES

## 2022-04-22 ENCOUNTER — OUTPATIENT (OUTPATIENT)
Dept: OUTPATIENT SERVICES | Facility: HOSPITAL | Age: 72
LOS: 1 days | Discharge: HOME | End: 2022-04-22

## 2022-04-22 ENCOUNTER — APPOINTMENT (OUTPATIENT)
Dept: MEDICATION MANAGEMENT | Facility: CLINIC | Age: 72
End: 2022-04-22

## 2022-04-22 DIAGNOSIS — Z79.01 LONG TERM (CURRENT) USE OF ANTICOAGULANTS: ICD-10-CM

## 2022-04-22 DIAGNOSIS — I48.91 UNSPECIFIED ATRIAL FIBRILLATION: ICD-10-CM

## 2022-04-22 LAB
INR PPP: 2 RATIO
POCT-PROTHROMBIN TIME: 23.2 SECS
QUALITY CONTROL: YES

## 2022-05-27 ENCOUNTER — APPOINTMENT (OUTPATIENT)
Dept: MEDICATION MANAGEMENT | Facility: CLINIC | Age: 72
End: 2022-05-27

## 2022-05-27 ENCOUNTER — OUTPATIENT (OUTPATIENT)
Dept: OUTPATIENT SERVICES | Facility: HOSPITAL | Age: 72
LOS: 1 days | Discharge: HOME | End: 2022-05-27

## 2022-05-27 DIAGNOSIS — Z79.01 LONG TERM (CURRENT) USE OF ANTICOAGULANTS: ICD-10-CM

## 2022-05-27 DIAGNOSIS — I48.91 UNSPECIFIED ATRIAL FIBRILLATION: ICD-10-CM

## 2022-05-27 LAB
INR PPP: 2.3 RATIO
POCT-PROTHROMBIN TIME: 28.1 SECS
QUALITY CONTROL: YES

## 2022-07-01 ENCOUNTER — APPOINTMENT (OUTPATIENT)
Dept: MEDICATION MANAGEMENT | Facility: CLINIC | Age: 72
End: 2022-07-01

## 2022-07-01 ENCOUNTER — OUTPATIENT (OUTPATIENT)
Dept: OUTPATIENT SERVICES | Facility: HOSPITAL | Age: 72
LOS: 1 days | Discharge: HOME | End: 2022-07-01

## 2022-07-01 DIAGNOSIS — Z79.01 LONG TERM (CURRENT) USE OF ANTICOAGULANTS: ICD-10-CM

## 2022-07-01 DIAGNOSIS — I48.91 UNSPECIFIED ATRIAL FIBRILLATION: ICD-10-CM

## 2022-07-01 LAB
INR PPP: 2.1 RATIO
POCT-PROTHROMBIN TIME: 25.5 SECS
QUALITY CONTROL: YES

## 2022-08-05 ENCOUNTER — OUTPATIENT (OUTPATIENT)
Dept: OUTPATIENT SERVICES | Facility: HOSPITAL | Age: 72
LOS: 1 days | Discharge: HOME | End: 2022-08-05

## 2022-08-05 ENCOUNTER — APPOINTMENT (OUTPATIENT)
Dept: MEDICATION MANAGEMENT | Facility: CLINIC | Age: 72
End: 2022-08-05

## 2022-08-05 DIAGNOSIS — I48.91 UNSPECIFIED ATRIAL FIBRILLATION: ICD-10-CM

## 2022-08-05 DIAGNOSIS — Z79.01 LONG TERM (CURRENT) USE OF ANTICOAGULANTS: ICD-10-CM

## 2022-09-09 ENCOUNTER — OUTPATIENT (OUTPATIENT)
Dept: OUTPATIENT SERVICES | Facility: HOSPITAL | Age: 72
LOS: 1 days | Discharge: HOME | End: 2022-09-09

## 2022-09-09 ENCOUNTER — APPOINTMENT (OUTPATIENT)
Dept: MEDICATION MANAGEMENT | Facility: CLINIC | Age: 72
End: 2022-09-09

## 2022-09-09 DIAGNOSIS — I48.91 UNSPECIFIED ATRIAL FIBRILLATION: ICD-10-CM

## 2022-09-09 DIAGNOSIS — Z79.01 LONG TERM (CURRENT) USE OF ANTICOAGULANTS: ICD-10-CM

## 2022-10-14 ENCOUNTER — OUTPATIENT (OUTPATIENT)
Dept: OUTPATIENT SERVICES | Facility: HOSPITAL | Age: 72
LOS: 1 days | Discharge: HOME | End: 2022-10-14

## 2022-10-14 ENCOUNTER — APPOINTMENT (OUTPATIENT)
Dept: MEDICATION MANAGEMENT | Facility: CLINIC | Age: 72
End: 2022-10-14

## 2022-10-14 DIAGNOSIS — Z79.01 LONG TERM (CURRENT) USE OF ANTICOAGULANTS: ICD-10-CM

## 2022-10-14 DIAGNOSIS — I48.91 UNSPECIFIED ATRIAL FIBRILLATION: ICD-10-CM

## 2022-10-14 LAB
INR PPP: 2.5 RATIO
POCT-PROTHROMBIN TIME: 30.1 SECS
QUALITY CONTROL: YES

## 2022-11-16 ENCOUNTER — RX RENEWAL (OUTPATIENT)
Age: 72
End: 2022-11-16

## 2022-11-18 ENCOUNTER — OUTPATIENT (OUTPATIENT)
Dept: OUTPATIENT SERVICES | Facility: HOSPITAL | Age: 72
LOS: 1 days | Discharge: HOME | End: 2022-11-18

## 2022-11-18 ENCOUNTER — APPOINTMENT (OUTPATIENT)
Dept: MEDICATION MANAGEMENT | Facility: CLINIC | Age: 72
End: 2022-11-18

## 2022-11-18 DIAGNOSIS — I48.91 UNSPECIFIED ATRIAL FIBRILLATION: ICD-10-CM

## 2022-11-18 DIAGNOSIS — Z79.01 LONG TERM (CURRENT) USE OF ANTICOAGULANTS: ICD-10-CM

## 2022-12-16 ENCOUNTER — OUTPATIENT (OUTPATIENT)
Dept: OUTPATIENT SERVICES | Facility: HOSPITAL | Age: 72
LOS: 1 days | Discharge: HOME | End: 2022-12-16

## 2022-12-16 ENCOUNTER — APPOINTMENT (OUTPATIENT)
Dept: MEDICATION MANAGEMENT | Facility: CLINIC | Age: 72
End: 2022-12-16

## 2022-12-16 DIAGNOSIS — Z79.01 LONG TERM (CURRENT) USE OF ANTICOAGULANTS: ICD-10-CM

## 2022-12-16 DIAGNOSIS — I48.91 UNSPECIFIED ATRIAL FIBRILLATION: ICD-10-CM

## 2023-01-20 ENCOUNTER — APPOINTMENT (OUTPATIENT)
Dept: MEDICATION MANAGEMENT | Facility: CLINIC | Age: 73
End: 2023-01-20

## 2023-01-20 ENCOUNTER — OUTPATIENT (OUTPATIENT)
Dept: OUTPATIENT SERVICES | Facility: HOSPITAL | Age: 73
LOS: 1 days | Discharge: HOME | End: 2023-01-20

## 2023-01-20 DIAGNOSIS — Z79.01 LONG TERM (CURRENT) USE OF ANTICOAGULANTS: ICD-10-CM

## 2023-01-20 DIAGNOSIS — I48.91 UNSPECIFIED ATRIAL FIBRILLATION: ICD-10-CM

## 2023-01-20 LAB
INR PPP: 2.6 RATIO
POCT-PROTHROMBIN TIME: 31.5 SECS
QUALITY CONTROL: YES

## 2023-02-24 ENCOUNTER — OUTPATIENT (OUTPATIENT)
Dept: OUTPATIENT SERVICES | Facility: HOSPITAL | Age: 73
LOS: 1 days | End: 2023-02-24
Payer: MEDICARE

## 2023-02-24 ENCOUNTER — APPOINTMENT (OUTPATIENT)
Dept: MEDICATION MANAGEMENT | Facility: CLINIC | Age: 73
End: 2023-02-24

## 2023-02-24 DIAGNOSIS — Z79.01 LONG TERM (CURRENT) USE OF ANTICOAGULANTS: ICD-10-CM

## 2023-02-24 LAB
INR PPP: 2.4 RATIO
POCT-PROTHROMBIN TIME: 28.5 SECS
QUALITY CONTROL: YES

## 2023-02-24 PROCEDURE — 85610 PROTHROMBIN TIME: CPT

## 2023-02-24 PROCEDURE — 99211 OFF/OP EST MAY X REQ PHY/QHP: CPT

## 2023-02-24 PROCEDURE — 36416 COLLJ CAPILLARY BLOOD SPEC: CPT

## 2023-02-25 DIAGNOSIS — Z79.01 LONG TERM (CURRENT) USE OF ANTICOAGULANTS: ICD-10-CM

## 2023-03-31 ENCOUNTER — APPOINTMENT (OUTPATIENT)
Dept: MEDICATION MANAGEMENT | Facility: CLINIC | Age: 73
End: 2023-03-31

## 2023-03-31 ENCOUNTER — OUTPATIENT (OUTPATIENT)
Dept: OUTPATIENT SERVICES | Facility: HOSPITAL | Age: 73
LOS: 1 days | End: 2023-03-31
Payer: MEDICARE

## 2023-03-31 DIAGNOSIS — Z79.01 LONG TERM (CURRENT) USE OF ANTICOAGULANTS: ICD-10-CM

## 2023-03-31 DIAGNOSIS — I48.91 UNSPECIFIED ATRIAL FIBRILLATION: ICD-10-CM

## 2023-03-31 LAB
INR PPP: 2.2
PT BLD: 26.3

## 2023-03-31 PROCEDURE — 85610 PROTHROMBIN TIME: CPT

## 2023-03-31 PROCEDURE — 99211 OFF/OP EST MAY X REQ PHY/QHP: CPT

## 2023-03-31 PROCEDURE — 36416 COLLJ CAPILLARY BLOOD SPEC: CPT

## 2023-04-01 DIAGNOSIS — I48.91 UNSPECIFIED ATRIAL FIBRILLATION: ICD-10-CM

## 2023-04-01 DIAGNOSIS — Z79.01 LONG TERM (CURRENT) USE OF ANTICOAGULANTS: ICD-10-CM

## 2023-05-12 ENCOUNTER — OUTPATIENT (OUTPATIENT)
Dept: OUTPATIENT SERVICES | Facility: HOSPITAL | Age: 73
LOS: 1 days | End: 2023-05-12
Payer: MEDICARE

## 2023-05-12 ENCOUNTER — APPOINTMENT (OUTPATIENT)
Dept: MEDICATION MANAGEMENT | Facility: CLINIC | Age: 73
End: 2023-05-12

## 2023-05-12 DIAGNOSIS — Z79.01 LONG TERM (CURRENT) USE OF ANTICOAGULANTS: ICD-10-CM

## 2023-05-12 DIAGNOSIS — I48.91 UNSPECIFIED ATRIAL FIBRILLATION: ICD-10-CM

## 2023-05-12 LAB
INR PPP: 2.3
PT BLD: 27.3

## 2023-05-12 PROCEDURE — 36416 COLLJ CAPILLARY BLOOD SPEC: CPT

## 2023-05-12 PROCEDURE — 85610 PROTHROMBIN TIME: CPT

## 2023-05-12 PROCEDURE — 99211 OFF/OP EST MAY X REQ PHY/QHP: CPT

## 2023-05-13 DIAGNOSIS — Z79.01 LONG TERM (CURRENT) USE OF ANTICOAGULANTS: ICD-10-CM

## 2023-05-13 DIAGNOSIS — I48.91 UNSPECIFIED ATRIAL FIBRILLATION: ICD-10-CM

## 2023-06-15 ENCOUNTER — APPOINTMENT (OUTPATIENT)
Dept: MEDICATION MANAGEMENT | Facility: CLINIC | Age: 73
End: 2023-06-15

## 2023-06-15 ENCOUNTER — OUTPATIENT (OUTPATIENT)
Dept: OUTPATIENT SERVICES | Facility: HOSPITAL | Age: 73
LOS: 1 days | End: 2023-06-15
Payer: MEDICARE

## 2023-06-15 DIAGNOSIS — Z79.01 LONG TERM (CURRENT) USE OF ANTICOAGULANTS: ICD-10-CM

## 2023-06-15 LAB
INR PPP: 2.8 RATIO
POCT-PROTHROMBIN TIME: 34 SECS
QUALITY CONTROL: YES

## 2023-06-15 PROCEDURE — 99211 OFF/OP EST MAY X REQ PHY/QHP: CPT

## 2023-06-15 PROCEDURE — 36416 COLLJ CAPILLARY BLOOD SPEC: CPT

## 2023-06-15 PROCEDURE — 85610 PROTHROMBIN TIME: CPT

## 2023-06-16 DIAGNOSIS — Z79.01 LONG TERM (CURRENT) USE OF ANTICOAGULANTS: ICD-10-CM

## 2023-07-24 ENCOUNTER — APPOINTMENT (OUTPATIENT)
Dept: MEDICATION MANAGEMENT | Facility: CLINIC | Age: 73
End: 2023-07-24

## 2023-07-24 ENCOUNTER — OUTPATIENT (OUTPATIENT)
Dept: OUTPATIENT SERVICES | Facility: HOSPITAL | Age: 73
LOS: 1 days | End: 2023-07-24
Payer: MEDICARE

## 2023-07-24 DIAGNOSIS — I48.91 UNSPECIFIED ATRIAL FIBRILLATION: ICD-10-CM

## 2023-07-24 DIAGNOSIS — Z79.01 LONG TERM (CURRENT) USE OF ANTICOAGULANTS: ICD-10-CM

## 2023-07-24 LAB
INR PPP: 2.8 RATIO
POCT-PROTHROMBIN TIME: 33.5 SECS
QUALITY CONTROL: YES

## 2023-07-24 PROCEDURE — 99211 OFF/OP EST MAY X REQ PHY/QHP: CPT

## 2023-07-24 PROCEDURE — 36416 COLLJ CAPILLARY BLOOD SPEC: CPT

## 2023-07-24 PROCEDURE — 85610 PROTHROMBIN TIME: CPT

## 2023-07-25 DIAGNOSIS — I48.91 UNSPECIFIED ATRIAL FIBRILLATION: ICD-10-CM

## 2023-07-25 DIAGNOSIS — Z79.01 LONG TERM (CURRENT) USE OF ANTICOAGULANTS: ICD-10-CM

## 2023-08-31 ENCOUNTER — OUTPATIENT (OUTPATIENT)
Dept: OUTPATIENT SERVICES | Facility: HOSPITAL | Age: 73
LOS: 1 days | End: 2023-08-31
Payer: MEDICARE

## 2023-08-31 ENCOUNTER — APPOINTMENT (OUTPATIENT)
Dept: MEDICATION MANAGEMENT | Facility: CLINIC | Age: 73
End: 2023-08-31

## 2023-08-31 DIAGNOSIS — I48.91 UNSPECIFIED ATRIAL FIBRILLATION: ICD-10-CM

## 2023-08-31 DIAGNOSIS — Z79.01 LONG TERM (CURRENT) USE OF ANTICOAGULANTS: ICD-10-CM

## 2023-08-31 LAB
INR PPP: 3 RATIO
POCT-PROTHROMBIN TIME: 37 SECS

## 2023-08-31 PROCEDURE — 36416 COLLJ CAPILLARY BLOOD SPEC: CPT

## 2023-08-31 PROCEDURE — 99211 OFF/OP EST MAY X REQ PHY/QHP: CPT

## 2023-08-31 PROCEDURE — 85610 PROTHROMBIN TIME: CPT

## 2023-09-01 DIAGNOSIS — Z79.01 LONG TERM (CURRENT) USE OF ANTICOAGULANTS: ICD-10-CM

## 2023-09-01 DIAGNOSIS — I48.91 UNSPECIFIED ATRIAL FIBRILLATION: ICD-10-CM

## 2023-09-26 ENCOUNTER — RX RENEWAL (OUTPATIENT)
Age: 73
End: 2023-09-26

## 2023-09-26 RX ORDER — WARFARIN 5 MG/1
5 TABLET ORAL
Qty: 135 | Refills: 3 | Status: ACTIVE | COMMUNITY
Start: 2021-03-05 | End: 1900-01-01

## 2023-10-05 ENCOUNTER — OUTPATIENT (OUTPATIENT)
Dept: OUTPATIENT SERVICES | Facility: HOSPITAL | Age: 73
LOS: 1 days | End: 2023-10-05
Payer: MEDICARE

## 2023-10-05 ENCOUNTER — APPOINTMENT (OUTPATIENT)
Dept: MEDICATION MANAGEMENT | Facility: CLINIC | Age: 73
End: 2023-10-05

## 2023-10-05 DIAGNOSIS — Z79.01 LONG TERM (CURRENT) USE OF ANTICOAGULANTS: ICD-10-CM

## 2023-10-05 DIAGNOSIS — I48.91 UNSPECIFIED ATRIAL FIBRILLATION: ICD-10-CM

## 2023-10-05 LAB
INR PPP: 3 RATIO
POCT-PROTHROMBIN TIME: 36.1 SECS

## 2023-10-05 PROCEDURE — 85610 PROTHROMBIN TIME: CPT

## 2023-10-05 PROCEDURE — 36416 COLLJ CAPILLARY BLOOD SPEC: CPT

## 2023-10-05 PROCEDURE — 99211 OFF/OP EST MAY X REQ PHY/QHP: CPT

## 2023-10-06 DIAGNOSIS — I48.91 UNSPECIFIED ATRIAL FIBRILLATION: ICD-10-CM

## 2023-10-06 DIAGNOSIS — Z79.01 LONG TERM (CURRENT) USE OF ANTICOAGULANTS: ICD-10-CM

## 2023-11-09 ENCOUNTER — APPOINTMENT (OUTPATIENT)
Dept: MEDICATION MANAGEMENT | Facility: CLINIC | Age: 73
End: 2023-11-09

## 2023-11-09 ENCOUNTER — OUTPATIENT (OUTPATIENT)
Dept: OUTPATIENT SERVICES | Facility: HOSPITAL | Age: 73
LOS: 1 days | End: 2023-11-09
Payer: MEDICARE

## 2023-11-09 DIAGNOSIS — Z79.01 LONG TERM (CURRENT) USE OF ANTICOAGULANTS: ICD-10-CM

## 2023-11-09 DIAGNOSIS — I48.91 UNSPECIFIED ATRIAL FIBRILLATION: ICD-10-CM

## 2023-11-09 LAB
INR PPP: 2.3 RATIO
POCT-PROTHROMBIN TIME: 27.5 SECS

## 2023-11-09 PROCEDURE — 36416 COLLJ CAPILLARY BLOOD SPEC: CPT

## 2023-11-09 PROCEDURE — 85610 PROTHROMBIN TIME: CPT

## 2023-11-09 PROCEDURE — 99211 OFF/OP EST MAY X REQ PHY/QHP: CPT

## 2023-11-10 DIAGNOSIS — I48.91 UNSPECIFIED ATRIAL FIBRILLATION: ICD-10-CM

## 2023-11-10 DIAGNOSIS — Z79.01 LONG TERM (CURRENT) USE OF ANTICOAGULANTS: ICD-10-CM

## 2023-12-14 ENCOUNTER — APPOINTMENT (OUTPATIENT)
Dept: MEDICATION MANAGEMENT | Facility: CLINIC | Age: 73
End: 2023-12-14

## 2023-12-14 ENCOUNTER — OUTPATIENT (OUTPATIENT)
Dept: OUTPATIENT SERVICES | Facility: HOSPITAL | Age: 73
LOS: 1 days | End: 2023-12-14
Payer: MEDICARE

## 2023-12-14 DIAGNOSIS — Z79.01 LONG TERM (CURRENT) USE OF ANTICOAGULANTS: ICD-10-CM

## 2023-12-14 DIAGNOSIS — I48.91 UNSPECIFIED ATRIAL FIBRILLATION: ICD-10-CM

## 2023-12-14 LAB
INR PPP: 2.4 RATIO
POCT-PROTHROMBIN TIME: 28.3 SECS

## 2023-12-14 PROCEDURE — 36416 COLLJ CAPILLARY BLOOD SPEC: CPT

## 2023-12-14 PROCEDURE — 99211 OFF/OP EST MAY X REQ PHY/QHP: CPT

## 2023-12-14 PROCEDURE — 85610 PROTHROMBIN TIME: CPT

## 2023-12-15 DIAGNOSIS — I48.91 UNSPECIFIED ATRIAL FIBRILLATION: ICD-10-CM

## 2023-12-15 DIAGNOSIS — Z79.01 LONG TERM (CURRENT) USE OF ANTICOAGULANTS: ICD-10-CM

## 2024-01-25 ENCOUNTER — APPOINTMENT (OUTPATIENT)
Dept: MEDICATION MANAGEMENT | Facility: CLINIC | Age: 74
End: 2024-01-25

## 2024-01-25 ENCOUNTER — OUTPATIENT (OUTPATIENT)
Dept: OUTPATIENT SERVICES | Facility: HOSPITAL | Age: 74
LOS: 1 days | End: 2024-01-25
Payer: MEDICARE

## 2024-01-25 DIAGNOSIS — Z79.01 LONG TERM (CURRENT) USE OF ANTICOAGULANTS: ICD-10-CM

## 2024-01-25 DIAGNOSIS — I48.91 UNSPECIFIED ATRIAL FIBRILLATION: ICD-10-CM

## 2024-01-25 LAB
INR PPP: 2.4 RATIO
POCT-PROTHROMBIN TIME: 28.6 SECS

## 2024-01-25 PROCEDURE — 36416 COLLJ CAPILLARY BLOOD SPEC: CPT

## 2024-01-25 PROCEDURE — 99211 OFF/OP EST MAY X REQ PHY/QHP: CPT

## 2024-01-25 PROCEDURE — 85610 PROTHROMBIN TIME: CPT

## 2024-01-26 DIAGNOSIS — I48.91 UNSPECIFIED ATRIAL FIBRILLATION: ICD-10-CM

## 2024-01-26 DIAGNOSIS — Z79.01 LONG TERM (CURRENT) USE OF ANTICOAGULANTS: ICD-10-CM

## 2024-02-29 ENCOUNTER — OUTPATIENT (OUTPATIENT)
Dept: OUTPATIENT SERVICES | Facility: HOSPITAL | Age: 74
LOS: 1 days | End: 2024-02-29
Payer: MEDICARE

## 2024-02-29 ENCOUNTER — APPOINTMENT (OUTPATIENT)
Dept: MEDICATION MANAGEMENT | Facility: CLINIC | Age: 74
End: 2024-02-29

## 2024-02-29 DIAGNOSIS — I48.91 UNSPECIFIED ATRIAL FIBRILLATION: ICD-10-CM

## 2024-02-29 DIAGNOSIS — Z79.01 LONG TERM (CURRENT) USE OF ANTICOAGULANTS: ICD-10-CM

## 2024-02-29 LAB
INR PPP: 2.2 RATIO
POCT-PROTHROMBIN TIME: 27 SECS

## 2024-02-29 PROCEDURE — 99211 OFF/OP EST MAY X REQ PHY/QHP: CPT

## 2024-02-29 PROCEDURE — 85610 PROTHROMBIN TIME: CPT

## 2024-02-29 PROCEDURE — 36416 COLLJ CAPILLARY BLOOD SPEC: CPT

## 2024-03-01 DIAGNOSIS — I48.91 UNSPECIFIED ATRIAL FIBRILLATION: ICD-10-CM

## 2024-03-01 DIAGNOSIS — Z79.01 LONG TERM (CURRENT) USE OF ANTICOAGULANTS: ICD-10-CM

## 2024-04-04 ENCOUNTER — OUTPATIENT (OUTPATIENT)
Dept: OUTPATIENT SERVICES | Facility: HOSPITAL | Age: 74
LOS: 1 days | End: 2024-04-04
Payer: MEDICARE

## 2024-04-04 ENCOUNTER — APPOINTMENT (OUTPATIENT)
Dept: MEDICATION MANAGEMENT | Facility: CLINIC | Age: 74
End: 2024-04-04

## 2024-04-04 DIAGNOSIS — I48.91 UNSPECIFIED ATRIAL FIBRILLATION: ICD-10-CM

## 2024-04-04 DIAGNOSIS — Z79.01 LONG TERM (CURRENT) USE OF ANTICOAGULANTS: ICD-10-CM

## 2024-04-04 LAB
INR PPP: 2.5 RATIO
POCT-PROTHROMBIN TIME: 29.8 SECS

## 2024-04-04 PROCEDURE — 85610 PROTHROMBIN TIME: CPT

## 2024-04-04 PROCEDURE — 99211 OFF/OP EST MAY X REQ PHY/QHP: CPT

## 2024-04-04 PROCEDURE — 36416 COLLJ CAPILLARY BLOOD SPEC: CPT

## 2024-04-05 DIAGNOSIS — I48.91 UNSPECIFIED ATRIAL FIBRILLATION: ICD-10-CM

## 2024-04-05 DIAGNOSIS — Z79.01 LONG TERM (CURRENT) USE OF ANTICOAGULANTS: ICD-10-CM

## 2024-05-09 ENCOUNTER — APPOINTMENT (OUTPATIENT)
Dept: MEDICATION MANAGEMENT | Facility: CLINIC | Age: 74
End: 2024-05-09

## 2024-05-09 ENCOUNTER — OUTPATIENT (OUTPATIENT)
Dept: OUTPATIENT SERVICES | Facility: HOSPITAL | Age: 74
LOS: 1 days | End: 2024-05-09
Payer: MEDICARE

## 2024-05-09 DIAGNOSIS — I48.91 UNSPECIFIED ATRIAL FIBRILLATION: ICD-10-CM

## 2024-05-09 DIAGNOSIS — Z79.01 LONG TERM (CURRENT) USE OF ANTICOAGULANTS: ICD-10-CM

## 2024-05-09 LAB
INR PPP: 2.1 RATIO
POCT-PROTHROMBIN TIME: 25.7 SECS

## 2024-05-09 PROCEDURE — 85610 PROTHROMBIN TIME: CPT

## 2024-05-09 PROCEDURE — 36416 COLLJ CAPILLARY BLOOD SPEC: CPT

## 2024-05-09 PROCEDURE — 99211 OFF/OP EST MAY X REQ PHY/QHP: CPT

## 2024-05-09 RX ORDER — AMOXICILLIN 500 MG/1
500 TABLET, FILM COATED ORAL 3 TIMES DAILY
Qty: 30 | Refills: 0 | Status: ACTIVE | COMMUNITY
Start: 2024-05-09 | End: 1900-01-01

## 2024-05-10 DIAGNOSIS — I48.91 UNSPECIFIED ATRIAL FIBRILLATION: ICD-10-CM

## 2024-05-10 DIAGNOSIS — Z79.01 LONG TERM (CURRENT) USE OF ANTICOAGULANTS: ICD-10-CM

## 2024-06-13 ENCOUNTER — APPOINTMENT (OUTPATIENT)
Dept: MEDICATION MANAGEMENT | Facility: CLINIC | Age: 74
End: 2024-06-13

## 2024-06-13 ENCOUNTER — OUTPATIENT (OUTPATIENT)
Dept: OUTPATIENT SERVICES | Facility: HOSPITAL | Age: 74
LOS: 1 days | End: 2024-06-13
Payer: MEDICARE

## 2024-06-13 DIAGNOSIS — I48.91 UNSPECIFIED ATRIAL FIBRILLATION: ICD-10-CM

## 2024-06-13 DIAGNOSIS — Z79.01 LONG TERM (CURRENT) USE OF ANTICOAGULANTS: ICD-10-CM

## 2024-06-13 LAB
INR PPP: 2.6 RATIO
POCT-PROTHROMBIN TIME: 31.2 SECS

## 2024-06-13 PROCEDURE — 36416 COLLJ CAPILLARY BLOOD SPEC: CPT

## 2024-06-13 PROCEDURE — 85610 PROTHROMBIN TIME: CPT

## 2024-06-13 PROCEDURE — 99211 OFF/OP EST MAY X REQ PHY/QHP: CPT

## 2024-06-14 DIAGNOSIS — I48.91 UNSPECIFIED ATRIAL FIBRILLATION: ICD-10-CM

## 2024-06-14 DIAGNOSIS — Z79.01 LONG TERM (CURRENT) USE OF ANTICOAGULANTS: ICD-10-CM

## 2024-07-11 ENCOUNTER — APPOINTMENT (OUTPATIENT)
Dept: MEDICATION MANAGEMENT | Facility: CLINIC | Age: 74
End: 2024-07-11

## 2024-07-11 ENCOUNTER — OUTPATIENT (OUTPATIENT)
Dept: OUTPATIENT SERVICES | Facility: HOSPITAL | Age: 74
LOS: 1 days | End: 2024-07-11
Payer: MEDICARE

## 2024-07-11 DIAGNOSIS — I48.91 UNSPECIFIED ATRIAL FIBRILLATION: ICD-10-CM

## 2024-07-11 DIAGNOSIS — Z79.01 LONG TERM (CURRENT) USE OF ANTICOAGULANTS: ICD-10-CM

## 2024-07-11 LAB — INR PPP: 2.7 RATIO

## 2024-07-11 PROCEDURE — 36416 COLLJ CAPILLARY BLOOD SPEC: CPT

## 2024-07-11 PROCEDURE — 85610 PROTHROMBIN TIME: CPT

## 2024-07-11 PROCEDURE — 99211 OFF/OP EST MAY X REQ PHY/QHP: CPT

## 2024-07-12 DIAGNOSIS — I48.91 UNSPECIFIED ATRIAL FIBRILLATION: ICD-10-CM

## 2024-07-12 DIAGNOSIS — Z79.01 LONG TERM (CURRENT) USE OF ANTICOAGULANTS: ICD-10-CM

## 2024-08-19 ENCOUNTER — APPOINTMENT (OUTPATIENT)
Dept: MEDICATION MANAGEMENT | Facility: CLINIC | Age: 74
End: 2024-08-19

## 2024-08-19 ENCOUNTER — OUTPATIENT (OUTPATIENT)
Dept: OUTPATIENT SERVICES | Facility: HOSPITAL | Age: 74
LOS: 1 days | End: 2024-08-19
Payer: MEDICARE

## 2024-08-19 DIAGNOSIS — I48.91 UNSPECIFIED ATRIAL FIBRILLATION: ICD-10-CM

## 2024-08-19 LAB
INR PPP: 2.3 RATIO
POCT-PROTHROMBIN TIME: 27.1 SECS

## 2024-08-19 PROCEDURE — 99211 OFF/OP EST MAY X REQ PHY/QHP: CPT

## 2024-08-19 PROCEDURE — 36416 COLLJ CAPILLARY BLOOD SPEC: CPT

## 2024-08-19 PROCEDURE — 85610 PROTHROMBIN TIME: CPT

## 2024-08-20 DIAGNOSIS — I48.91 UNSPECIFIED ATRIAL FIBRILLATION: ICD-10-CM

## 2024-08-20 DIAGNOSIS — Z79.01 LONG TERM (CURRENT) USE OF ANTICOAGULANTS: ICD-10-CM

## 2024-09-19 ENCOUNTER — OUTPATIENT (OUTPATIENT)
Dept: OUTPATIENT SERVICES | Facility: HOSPITAL | Age: 74
LOS: 1 days | End: 2024-09-19
Payer: MEDICARE

## 2024-09-19 ENCOUNTER — APPOINTMENT (OUTPATIENT)
Dept: MEDICATION MANAGEMENT | Facility: CLINIC | Age: 74
End: 2024-09-19

## 2024-09-19 DIAGNOSIS — Z79.01 LONG TERM (CURRENT) USE OF ANTICOAGULANTS: ICD-10-CM

## 2024-09-19 DIAGNOSIS — I48.91 UNSPECIFIED ATRIAL FIBRILLATION: ICD-10-CM

## 2024-09-19 LAB
INR PPP: 2 RATIO
POCT-PROTHROMBIN TIME: 23.1 SECS

## 2024-09-19 PROCEDURE — 99211 OFF/OP EST MAY X REQ PHY/QHP: CPT

## 2024-09-19 PROCEDURE — 36416 COLLJ CAPILLARY BLOOD SPEC: CPT

## 2024-09-19 PROCEDURE — 85610 PROTHROMBIN TIME: CPT

## 2024-09-20 DIAGNOSIS — I48.91 UNSPECIFIED ATRIAL FIBRILLATION: ICD-10-CM

## 2024-09-20 DIAGNOSIS — Z79.01 LONG TERM (CURRENT) USE OF ANTICOAGULANTS: ICD-10-CM

## 2024-09-26 ENCOUNTER — APPOINTMENT (OUTPATIENT)
Dept: MEDICATION MANAGEMENT | Facility: CLINIC | Age: 74
End: 2024-09-26

## 2024-10-24 ENCOUNTER — OUTPATIENT (OUTPATIENT)
Dept: OUTPATIENT SERVICES | Facility: HOSPITAL | Age: 74
LOS: 1 days | End: 2024-10-24
Payer: MEDICARE

## 2024-10-24 ENCOUNTER — APPOINTMENT (OUTPATIENT)
Dept: MEDICATION MANAGEMENT | Facility: CLINIC | Age: 74
End: 2024-10-24

## 2024-10-24 DIAGNOSIS — Z79.01 LONG TERM (CURRENT) USE OF ANTICOAGULANTS: ICD-10-CM

## 2024-10-24 DIAGNOSIS — I48.91 UNSPECIFIED ATRIAL FIBRILLATION: ICD-10-CM

## 2024-10-24 LAB
INR PPP: 2 RATIO
POCT-PROTHROMBIN TIME: 24 SECS

## 2024-10-24 PROCEDURE — 85610 PROTHROMBIN TIME: CPT

## 2024-10-24 PROCEDURE — 36416 COLLJ CAPILLARY BLOOD SPEC: CPT

## 2024-10-24 PROCEDURE — 99211 OFF/OP EST MAY X REQ PHY/QHP: CPT

## 2024-10-25 DIAGNOSIS — Z79.01 LONG TERM (CURRENT) USE OF ANTICOAGULANTS: ICD-10-CM

## 2024-10-25 DIAGNOSIS — I48.91 UNSPECIFIED ATRIAL FIBRILLATION: ICD-10-CM

## 2024-11-21 ENCOUNTER — OUTPATIENT (OUTPATIENT)
Dept: OUTPATIENT SERVICES | Facility: HOSPITAL | Age: 74
LOS: 1 days | End: 2024-11-21
Payer: MEDICARE

## 2024-11-21 ENCOUNTER — APPOINTMENT (OUTPATIENT)
Dept: MEDICATION MANAGEMENT | Facility: CLINIC | Age: 74
End: 2024-11-21

## 2024-11-21 DIAGNOSIS — I48.91 UNSPECIFIED ATRIAL FIBRILLATION: ICD-10-CM

## 2024-11-21 DIAGNOSIS — Z79.01 LONG TERM (CURRENT) USE OF ANTICOAGULANTS: ICD-10-CM

## 2024-11-21 PROCEDURE — 99211 OFF/OP EST MAY X REQ PHY/QHP: CPT

## 2024-11-21 PROCEDURE — 36416 COLLJ CAPILLARY BLOOD SPEC: CPT

## 2024-11-21 PROCEDURE — 85610 PROTHROMBIN TIME: CPT

## 2024-11-22 DIAGNOSIS — I48.91 UNSPECIFIED ATRIAL FIBRILLATION: ICD-10-CM

## 2024-11-22 DIAGNOSIS — Z79.01 LONG TERM (CURRENT) USE OF ANTICOAGULANTS: ICD-10-CM

## 2024-12-26 ENCOUNTER — APPOINTMENT (OUTPATIENT)
Dept: MEDICATION MANAGEMENT | Facility: CLINIC | Age: 74
End: 2024-12-26

## 2024-12-26 ENCOUNTER — OUTPATIENT (OUTPATIENT)
Dept: OUTPATIENT SERVICES | Facility: HOSPITAL | Age: 74
LOS: 1 days | End: 2024-12-26
Payer: MEDICARE

## 2024-12-26 DIAGNOSIS — Z79.01 LONG TERM (CURRENT) USE OF ANTICOAGULANTS: ICD-10-CM

## 2024-12-26 DIAGNOSIS — I48.91 UNSPECIFIED ATRIAL FIBRILLATION: ICD-10-CM

## 2024-12-26 LAB
INR PPP: 2.2 RATIO
POCT-PROTHROMBIN TIME: 27 SECS

## 2024-12-26 PROCEDURE — 36416 COLLJ CAPILLARY BLOOD SPEC: CPT

## 2024-12-26 PROCEDURE — 99211 OFF/OP EST MAY X REQ PHY/QHP: CPT

## 2024-12-26 PROCEDURE — 85610 PROTHROMBIN TIME: CPT

## 2024-12-27 DIAGNOSIS — Z79.01 LONG TERM (CURRENT) USE OF ANTICOAGULANTS: ICD-10-CM

## 2024-12-27 DIAGNOSIS — I48.91 UNSPECIFIED ATRIAL FIBRILLATION: ICD-10-CM

## 2025-01-27 ENCOUNTER — RX RENEWAL (OUTPATIENT)
Age: 75
End: 2025-01-27

## 2025-02-04 ENCOUNTER — RESULT REVIEW (OUTPATIENT)
Age: 75
End: 2025-02-04

## 2025-02-04 ENCOUNTER — INPATIENT (INPATIENT)
Facility: HOSPITAL | Age: 75
LOS: 23 days | Discharge: HOME HEALTH | DRG: 682 | End: 2025-02-28
Attending: INTERNAL MEDICINE | Admitting: INTERNAL MEDICINE
Payer: MEDICARE

## 2025-02-04 VITALS
RESPIRATION RATE: 18 BRPM | WEIGHT: 235.01 LBS | HEART RATE: 86 BPM | HEIGHT: 75 IN | TEMPERATURE: 98 F | OXYGEN SATURATION: 98 % | DIASTOLIC BLOOD PRESSURE: 45 MMHG | SYSTOLIC BLOOD PRESSURE: 65 MMHG

## 2025-02-04 DIAGNOSIS — N17.9 ACUTE KIDNEY FAILURE, UNSPECIFIED: ICD-10-CM

## 2025-02-04 LAB
ALBUMIN SERPL ELPH-MCNC: 3.6 G/DL — SIGNIFICANT CHANGE UP (ref 3.5–5.2)
ALBUMIN SERPL ELPH-MCNC: 4.3 G/DL — SIGNIFICANT CHANGE UP (ref 3.5–5.2)
ALP SERPL-CCNC: 49 U/L — SIGNIFICANT CHANGE UP (ref 30–115)
ALP SERPL-CCNC: 59 U/L — SIGNIFICANT CHANGE UP (ref 30–115)
ALT FLD-CCNC: 40 U/L — SIGNIFICANT CHANGE UP (ref 0–41)
ALT FLD-CCNC: 49 U/L — HIGH (ref 0–41)
ANION GAP SERPL CALC-SCNC: 16 MMOL/L — HIGH (ref 7–14)
ANION GAP SERPL CALC-SCNC: 22 MMOL/L — HIGH (ref 7–14)
APPEARANCE UR: CLEAR — SIGNIFICANT CHANGE UP
AST SERPL-CCNC: 26 U/L — SIGNIFICANT CHANGE UP (ref 0–41)
AST SERPL-CCNC: 30 U/L — SIGNIFICANT CHANGE UP (ref 0–41)
BACTERIA # UR AUTO: NEGATIVE /HPF — SIGNIFICANT CHANGE UP
BASE EXCESS BLDV CALC-SCNC: -14.1 MMOL/L — LOW (ref -2–3)
BASOPHILS # BLD AUTO: 0.02 K/UL — SIGNIFICANT CHANGE UP (ref 0–0.2)
BASOPHILS # BLD AUTO: 0.04 K/UL — SIGNIFICANT CHANGE UP (ref 0–0.2)
BASOPHILS # BLD AUTO: 0.06 K/UL — SIGNIFICANT CHANGE UP (ref 0–0.2)
BASOPHILS NFR BLD AUTO: 0.5 % — SIGNIFICANT CHANGE UP (ref 0–1)
BASOPHILS NFR BLD AUTO: 0.7 % — SIGNIFICANT CHANGE UP (ref 0–1)
BASOPHILS NFR BLD AUTO: 0.9 % — SIGNIFICANT CHANGE UP (ref 0–1)
BILIRUB SERPL-MCNC: 0.7 MG/DL — SIGNIFICANT CHANGE UP (ref 0.2–1.2)
BILIRUB SERPL-MCNC: 0.9 MG/DL — SIGNIFICANT CHANGE UP (ref 0.2–1.2)
BILIRUB UR-MCNC: NEGATIVE — SIGNIFICANT CHANGE UP
BUN SERPL-MCNC: 101 MG/DL — CRITICAL HIGH (ref 10–20)
BUN SERPL-MCNC: 110 MG/DL — CRITICAL HIGH (ref 10–20)
CA-I SERPL-SCNC: 1.09 MMOL/L — LOW (ref 1.15–1.33)
CALCIUM SERPL-MCNC: 7.5 MG/DL — LOW (ref 8.4–10.5)
CALCIUM SERPL-MCNC: 8.3 MG/DL — LOW (ref 8.4–10.5)
CAST: 6 /LPF — HIGH (ref 0–4)
CHLORIDE SERPL-SCNC: 103 MMOL/L — SIGNIFICANT CHANGE UP (ref 98–110)
CHLORIDE SERPL-SCNC: 111 MMOL/L — HIGH (ref 98–110)
CO2 SERPL-SCNC: 12 MMOL/L — LOW (ref 17–32)
CO2 SERPL-SCNC: 13 MMOL/L — LOW (ref 17–32)
COLOR SPEC: YELLOW — SIGNIFICANT CHANGE UP
CREAT SERPL-MCNC: 4.1 MG/DL — CRITICAL HIGH (ref 0.7–1.5)
CREAT SERPL-MCNC: 5.3 MG/DL — CRITICAL HIGH (ref 0.7–1.5)
DIFF PNL FLD: ABNORMAL
EGFR: 11 ML/MIN/1.73M2 — LOW
EGFR: 15 ML/MIN/1.73M2 — LOW
EOSINOPHIL # BLD AUTO: 0.05 K/UL — SIGNIFICANT CHANGE UP (ref 0–0.7)
EOSINOPHIL # BLD AUTO: 0.09 K/UL — SIGNIFICANT CHANGE UP (ref 0–0.7)
EOSINOPHIL # BLD AUTO: 0.22 K/UL — SIGNIFICANT CHANGE UP (ref 0–0.7)
EOSINOPHIL NFR BLD AUTO: 1.3 % — SIGNIFICANT CHANGE UP (ref 0–8)
EOSINOPHIL NFR BLD AUTO: 2 % — SIGNIFICANT CHANGE UP (ref 0–8)
EOSINOPHIL NFR BLD AUTO: 2.5 % — SIGNIFICANT CHANGE UP (ref 0–8)
GAS PNL BLDV: 128 MMOL/L — LOW (ref 136–145)
GAS PNL BLDV: SIGNIFICANT CHANGE UP
GAS PNL BLDV: SIGNIFICANT CHANGE UP
GLUCOSE BLDC GLUCOMTR-MCNC: 105 MG/DL — HIGH (ref 70–99)
GLUCOSE SERPL-MCNC: 129 MG/DL — HIGH (ref 70–99)
GLUCOSE SERPL-MCNC: 158 MG/DL — HIGH (ref 70–99)
GLUCOSE UR QL: 100 MG/DL
HCO3 BLDV-SCNC: 14 MMOL/L — LOW (ref 22–29)
HCT VFR BLD CALC: 29 % — LOW (ref 42–52)
HCT VFR BLD CALC: 31.9 % — LOW (ref 42–52)
HCT VFR BLD CALC: 36.2 % — LOW (ref 42–52)
HCT VFR BLDA CALC: 52 % — HIGH (ref 39–51)
HGB BLD CALC-MCNC: 17.4 G/DL — SIGNIFICANT CHANGE UP (ref 12.6–17.4)
HGB BLD-MCNC: 10.4 G/DL — LOW (ref 14–18)
HGB BLD-MCNC: 11.8 G/DL — LOW (ref 14–18)
HGB BLD-MCNC: 9.6 G/DL — LOW (ref 14–18)
IMM GRANULOCYTES NFR BLD AUTO: 0.3 % — SIGNIFICANT CHANGE UP (ref 0.1–0.3)
IMM GRANULOCYTES NFR BLD AUTO: 0.3 % — SIGNIFICANT CHANGE UP (ref 0.1–0.3)
IMM GRANULOCYTES NFR BLD AUTO: 0.5 % — HIGH (ref 0.1–0.3)
KETONES UR-MCNC: NEGATIVE MG/DL — SIGNIFICANT CHANGE UP
LACTATE BLDV-MCNC: 1.7 MMOL/L — SIGNIFICANT CHANGE UP (ref 0.5–2)
LACTATE SERPL-SCNC: 1.5 MMOL/L — SIGNIFICANT CHANGE UP (ref 0.7–2)
LACTATE SERPL-SCNC: 1.6 MMOL/L — SIGNIFICANT CHANGE UP (ref 0.7–2)
LEUKOCYTE ESTERASE UR-ACNC: NEGATIVE — SIGNIFICANT CHANGE UP
LIDOCAIN IGE QN: 77 U/L — HIGH (ref 7–60)
LYMPHOCYTES # BLD AUTO: 0.92 K/UL — LOW (ref 1.2–3.4)
LYMPHOCYTES # BLD AUTO: 0.92 K/UL — LOW (ref 1.2–3.4)
LYMPHOCYTES # BLD AUTO: 2 K/UL — SIGNIFICANT CHANGE UP (ref 1.2–3.4)
LYMPHOCYTES # BLD AUTO: 20.9 % — SIGNIFICANT CHANGE UP (ref 20.5–51.1)
LYMPHOCYTES # BLD AUTO: 22.6 % — SIGNIFICANT CHANGE UP (ref 20.5–51.1)
LYMPHOCYTES # BLD AUTO: 23.6 % — SIGNIFICANT CHANGE UP (ref 20.5–51.1)
MAGNESIUM SERPL-MCNC: 1.1 MG/DL — LOW (ref 1.8–2.4)
MCHC RBC-ENTMCNC: 30.2 PG — SIGNIFICANT CHANGE UP (ref 27–31)
MCHC RBC-ENTMCNC: 30.2 PG — SIGNIFICANT CHANGE UP (ref 27–31)
MCHC RBC-ENTMCNC: 30.3 PG — SIGNIFICANT CHANGE UP (ref 27–31)
MCHC RBC-ENTMCNC: 32.6 G/DL — SIGNIFICANT CHANGE UP (ref 32–37)
MCHC RBC-ENTMCNC: 32.6 G/DL — SIGNIFICANT CHANGE UP (ref 32–37)
MCHC RBC-ENTMCNC: 33.1 G/DL — SIGNIFICANT CHANGE UP (ref 32–37)
MCV RBC AUTO: 91.2 FL — SIGNIFICANT CHANGE UP (ref 80–94)
MCV RBC AUTO: 92.6 FL — SIGNIFICANT CHANGE UP (ref 80–94)
MCV RBC AUTO: 93 FL — SIGNIFICANT CHANGE UP (ref 80–94)
MONOCYTES # BLD AUTO: 0.39 K/UL — SIGNIFICANT CHANGE UP (ref 0.1–0.6)
MONOCYTES # BLD AUTO: 0.58 K/UL — SIGNIFICANT CHANGE UP (ref 0.1–0.6)
MONOCYTES # BLD AUTO: 1.18 K/UL — HIGH (ref 0.1–0.6)
MONOCYTES NFR BLD AUTO: 13.3 % — HIGH (ref 1.7–9.3)
MONOCYTES NFR BLD AUTO: 14.9 % — HIGH (ref 1.7–9.3)
MONOCYTES NFR BLD AUTO: 8.9 % — SIGNIFICANT CHANGE UP (ref 1.7–9.3)
NEUTROPHILS # BLD AUTO: 2.32 K/UL — SIGNIFICANT CHANGE UP (ref 1.4–6.5)
NEUTROPHILS # BLD AUTO: 2.94 K/UL — SIGNIFICANT CHANGE UP (ref 1.4–6.5)
NEUTROPHILS # BLD AUTO: 5.37 K/UL — SIGNIFICANT CHANGE UP (ref 1.4–6.5)
NEUTROPHILS NFR BLD AUTO: 59.4 % — SIGNIFICANT CHANGE UP (ref 42.2–75.2)
NEUTROPHILS NFR BLD AUTO: 60.6 % — SIGNIFICANT CHANGE UP (ref 42.2–75.2)
NEUTROPHILS NFR BLD AUTO: 66.8 % — SIGNIFICANT CHANGE UP (ref 42.2–75.2)
NITRITE UR-MCNC: NEGATIVE — SIGNIFICANT CHANGE UP
NRBC # BLD: 0 /100 WBCS — SIGNIFICANT CHANGE UP (ref 0–0)
NRBC BLD-RTO: 0 /100 WBCS — SIGNIFICANT CHANGE UP (ref 0–0)
NT-PROBNP SERPL-SCNC: 1918 PG/ML — HIGH (ref 0–300)
PCO2 BLDV: 41 MMHG — LOW (ref 42–55)
PH BLDV: 7.15 — CRITICAL LOW (ref 7.32–7.43)
PH UR: 5.5 — SIGNIFICANT CHANGE UP (ref 5–8)
PHOSPHATE SERPL-MCNC: 7.9 MG/DL — HIGH (ref 2.1–4.9)
PLATELET # BLD AUTO: 190 K/UL — SIGNIFICANT CHANGE UP (ref 130–400)
PLATELET # BLD AUTO: 202 K/UL — SIGNIFICANT CHANGE UP (ref 130–400)
PLATELET # BLD AUTO: 249 K/UL — SIGNIFICANT CHANGE UP (ref 130–400)
PMV BLD: 11.8 FL — HIGH (ref 7.4–10.4)
PMV BLD: 11.9 FL — HIGH (ref 7.4–10.4)
PMV BLD: 12.2 FL — HIGH (ref 7.4–10.4)
PO2 BLDV: 19 MMHG — LOW (ref 25–45)
POTASSIUM BLDV-SCNC: 5.5 MMOL/L — HIGH (ref 3.5–5.1)
POTASSIUM SERPL-MCNC: 5.4 MMOL/L — HIGH (ref 3.5–5)
POTASSIUM SERPL-MCNC: 5.6 MMOL/L — HIGH (ref 3.5–5)
POTASSIUM SERPL-SCNC: 5.4 MMOL/L — HIGH (ref 3.5–5)
POTASSIUM SERPL-SCNC: 5.6 MMOL/L — HIGH (ref 3.5–5)
PROT SERPL-MCNC: 5.5 G/DL — LOW (ref 6–8)
PROT SERPL-MCNC: 7 G/DL — SIGNIFICANT CHANGE UP (ref 6–8)
PROT UR-MCNC: 100 MG/DL
RBC # BLD: 3.18 M/UL — LOW (ref 4.7–6.1)
RBC # BLD: 3.43 M/UL — LOW (ref 4.7–6.1)
RBC # BLD: 3.91 M/UL — LOW (ref 4.7–6.1)
RBC # FLD: 12.8 % — SIGNIFICANT CHANGE UP (ref 11.5–14.5)
RBC # FLD: 12.8 % — SIGNIFICANT CHANGE UP (ref 11.5–14.5)
RBC # FLD: 13 % — SIGNIFICANT CHANGE UP (ref 11.5–14.5)
RBC CASTS # UR COMP ASSIST: 1 /HPF — SIGNIFICANT CHANGE UP (ref 0–4)
SAO2 % BLDV: 28.1 % — LOW (ref 67–88)
SODIUM SERPL-SCNC: 138 MMOL/L — SIGNIFICANT CHANGE UP (ref 135–146)
SODIUM SERPL-SCNC: 139 MMOL/L — SIGNIFICANT CHANGE UP (ref 135–146)
SP GR SPEC: 1.01 — SIGNIFICANT CHANGE UP (ref 1–1.03)
SQUAMOUS # UR AUTO: 3 /HPF — SIGNIFICANT CHANGE UP (ref 0–5)
TROPONIN T, HIGH SENSITIVITY RESULT: 33 NG/L — HIGH (ref 6–21)
TROPONIN T, HIGH SENSITIVITY RESULT: 34 NG/L — HIGH (ref 6–21)
TROPONIN T, HIGH SENSITIVITY RESULT: 43 NG/L — HIGH (ref 6–21)
TROPONIN T, HIGH SENSITIVITY RESULT: 56 NG/L — CRITICAL HIGH (ref 6–21)
UROBILINOGEN FLD QL: 0.2 MG/DL — SIGNIFICANT CHANGE UP (ref 0.2–1)
WBC # BLD: 3.9 K/UL — LOW (ref 4.8–10.8)
WBC # BLD: 4.4 K/UL — LOW (ref 4.8–10.8)
WBC # BLD: 8.86 K/UL — SIGNIFICANT CHANGE UP (ref 4.8–10.8)
WBC # FLD AUTO: 3.9 K/UL — LOW (ref 4.8–10.8)
WBC # FLD AUTO: 4.4 K/UL — LOW (ref 4.8–10.8)
WBC # FLD AUTO: 8.86 K/UL — SIGNIFICANT CHANGE UP (ref 4.8–10.8)
WBC UR QL: 2 /HPF — SIGNIFICANT CHANGE UP (ref 0–5)

## 2025-02-04 PROCEDURE — 84480 ASSAY TRIIODOTHYRONINE (T3): CPT

## 2025-02-04 PROCEDURE — 93308 TTE F-UP OR LMTD: CPT

## 2025-02-04 PROCEDURE — 85730 THROMBOPLASTIN TIME PARTIAL: CPT

## 2025-02-04 PROCEDURE — 93308 TTE F-UP OR LMTD: CPT | Mod: 26

## 2025-02-04 PROCEDURE — 71045 X-RAY EXAM CHEST 1 VIEW: CPT

## 2025-02-04 PROCEDURE — 84155 ASSAY OF PROTEIN SERUM: CPT

## 2025-02-04 PROCEDURE — 82010 KETONE BODYS QUAN: CPT

## 2025-02-04 PROCEDURE — 85610 PROTHROMBIN TIME: CPT

## 2025-02-04 PROCEDURE — 86901 BLOOD TYPING SEROLOGIC RH(D): CPT

## 2025-02-04 PROCEDURE — 82570 ASSAY OF URINE CREATININE: CPT

## 2025-02-04 PROCEDURE — 93306 TTE W/DOPPLER COMPLETE: CPT

## 2025-02-04 PROCEDURE — 93970 EXTREMITY STUDY: CPT

## 2025-02-04 PROCEDURE — 97530 THERAPEUTIC ACTIVITIES: CPT | Mod: GP

## 2025-02-04 PROCEDURE — 84295 ASSAY OF SERUM SODIUM: CPT

## 2025-02-04 PROCEDURE — 82330 ASSAY OF CALCIUM: CPT

## 2025-02-04 PROCEDURE — 85018 HEMOGLOBIN: CPT

## 2025-02-04 PROCEDURE — 80202 ASSAY OF VANCOMYCIN: CPT

## 2025-02-04 PROCEDURE — 82803 BLOOD GASES ANY COMBINATION: CPT

## 2025-02-04 PROCEDURE — 86900 BLOOD TYPING SEROLOGIC ABO: CPT

## 2025-02-04 PROCEDURE — 83540 ASSAY OF IRON: CPT

## 2025-02-04 PROCEDURE — 99223 1ST HOSP IP/OBS HIGH 75: CPT

## 2025-02-04 PROCEDURE — 87641 MR-STAPH DNA AMP PROBE: CPT

## 2025-02-04 PROCEDURE — 86923 COMPATIBILITY TEST ELECTRIC: CPT

## 2025-02-04 PROCEDURE — 85014 HEMATOCRIT: CPT

## 2025-02-04 PROCEDURE — 84156 ASSAY OF PROTEIN URINE: CPT

## 2025-02-04 PROCEDURE — 84484 ASSAY OF TROPONIN QUANT: CPT

## 2025-02-04 PROCEDURE — 83605 ASSAY OF LACTIC ACID: CPT

## 2025-02-04 PROCEDURE — 93970 EXTREMITY STUDY: CPT | Mod: 26

## 2025-02-04 PROCEDURE — 80048 BASIC METABOLIC PNL TOTAL CA: CPT

## 2025-02-04 PROCEDURE — 80053 COMPREHEN METABOLIC PANEL: CPT

## 2025-02-04 PROCEDURE — 87640 STAPH A DNA AMP PROBE: CPT

## 2025-02-04 PROCEDURE — 93306 TTE W/DOPPLER COMPLETE: CPT | Mod: 26

## 2025-02-04 PROCEDURE — 82607 VITAMIN B-12: CPT

## 2025-02-04 PROCEDURE — 76770 US EXAM ABDO BACK WALL COMP: CPT

## 2025-02-04 PROCEDURE — 80162 ASSAY OF DIGOXIN TOTAL: CPT

## 2025-02-04 PROCEDURE — 82962 GLUCOSE BLOOD TEST: CPT

## 2025-02-04 PROCEDURE — 82024 ASSAY OF ACTH: CPT

## 2025-02-04 PROCEDURE — 74176 CT ABD & PELVIS W/O CONTRAST: CPT | Mod: MC

## 2025-02-04 PROCEDURE — 93010 ELECTROCARDIOGRAM REPORT: CPT

## 2025-02-04 PROCEDURE — 83521 IG LIGHT CHAINS FREE EACH: CPT

## 2025-02-04 PROCEDURE — 84540 ASSAY OF URINE/UREA-N: CPT

## 2025-02-04 PROCEDURE — 36430 TRANSFUSION BLD/BLD COMPNT: CPT

## 2025-02-04 PROCEDURE — 83935 ASSAY OF URINE OSMOLALITY: CPT

## 2025-02-04 PROCEDURE — 82728 ASSAY OF FERRITIN: CPT

## 2025-02-04 PROCEDURE — 36415 COLL VENOUS BLD VENIPUNCTURE: CPT

## 2025-02-04 PROCEDURE — 97162 PT EVAL MOD COMPLEX 30 MIN: CPT | Mod: GP

## 2025-02-04 PROCEDURE — 84300 ASSAY OF URINE SODIUM: CPT

## 2025-02-04 PROCEDURE — 85025 COMPLETE CBC W/AUTO DIFF WBC: CPT

## 2025-02-04 PROCEDURE — 82746 ASSAY OF FOLIC ACID SERUM: CPT

## 2025-02-04 PROCEDURE — 84132 ASSAY OF SERUM POTASSIUM: CPT

## 2025-02-04 PROCEDURE — 84443 ASSAY THYROID STIM HORMONE: CPT

## 2025-02-04 PROCEDURE — 81001 URINALYSIS AUTO W/SCOPE: CPT

## 2025-02-04 PROCEDURE — 82533 TOTAL CORTISOL: CPT

## 2025-02-04 PROCEDURE — 71045 X-RAY EXAM CHEST 1 VIEW: CPT | Mod: 26

## 2025-02-04 PROCEDURE — 84100 ASSAY OF PHOSPHORUS: CPT

## 2025-02-04 PROCEDURE — 97116 GAIT TRAINING THERAPY: CPT | Mod: GP

## 2025-02-04 PROCEDURE — 83550 IRON BINDING TEST: CPT

## 2025-02-04 PROCEDURE — 84133 ASSAY OF URINE POTASSIUM: CPT

## 2025-02-04 PROCEDURE — 99291 CRITICAL CARE FIRST HOUR: CPT

## 2025-02-04 PROCEDURE — 83735 ASSAY OF MAGNESIUM: CPT

## 2025-02-04 PROCEDURE — 87040 BLOOD CULTURE FOR BACTERIA: CPT

## 2025-02-04 PROCEDURE — 0241U: CPT

## 2025-02-04 PROCEDURE — 86850 RBC ANTIBODY SCREEN: CPT

## 2025-02-04 PROCEDURE — 86334 IMMUNOFIX E-PHORESIS SERUM: CPT

## 2025-02-04 PROCEDURE — P9016: CPT

## 2025-02-04 PROCEDURE — 84165 PROTEIN E-PHORESIS SERUM: CPT

## 2025-02-04 PROCEDURE — 84145 PROCALCITONIN (PCT): CPT

## 2025-02-04 PROCEDURE — 74176 CT ABD & PELVIS W/O CONTRAST: CPT | Mod: 26

## 2025-02-04 PROCEDURE — 83880 ASSAY OF NATRIURETIC PEPTIDE: CPT

## 2025-02-04 PROCEDURE — 84439 ASSAY OF FREE THYROXINE: CPT

## 2025-02-04 PROCEDURE — 85027 COMPLETE CBC AUTOMATED: CPT

## 2025-02-04 PROCEDURE — 97110 THERAPEUTIC EXERCISES: CPT | Mod: GP

## 2025-02-04 RX ORDER — SODIUM ZIRCONIUM CYCLOSILICATE 5 G/5G
10 POWDER, FOR SUSPENSION ORAL EVERY 8 HOURS
Refills: 0 | Status: DISCONTINUED | OUTPATIENT
Start: 2025-02-04 | End: 2025-02-05

## 2025-02-04 RX ORDER — SODIUM CHLORIDE 9 G/1000ML
500 INJECTION, SOLUTION INTRAVENOUS ONCE
Refills: 0 | Status: COMPLETED | OUTPATIENT
Start: 2025-02-04 | End: 2025-02-04

## 2025-02-04 RX ORDER — SODIUM ZIRCONIUM CYCLOSILICATE 5 G/5G
5 POWDER, FOR SUSPENSION ORAL ONCE
Refills: 0 | Status: COMPLETED | OUTPATIENT
Start: 2025-02-04 | End: 2025-02-04

## 2025-02-04 RX ORDER — SODIUM CHLORIDE 9 G/1000ML
1000 INJECTION, SOLUTION INTRAVENOUS ONCE
Refills: 0 | Status: COMPLETED | OUTPATIENT
Start: 2025-02-04 | End: 2025-02-04

## 2025-02-04 RX ORDER — NOREPINEPHRINE BITARTRATE 8 MG
0.05 SOLUTION INTRAVENOUS
Qty: 8 | Refills: 0 | Status: DISCONTINUED | OUTPATIENT
Start: 2025-02-04 | End: 2025-02-05

## 2025-02-04 RX ORDER — MAGNESIUM SULFATE 500 MG/ML
2 SYRINGE (ML) INJECTION ONCE
Refills: 0 | Status: COMPLETED | OUTPATIENT
Start: 2025-02-04 | End: 2025-02-04

## 2025-02-04 RX ORDER — EZETIMIBE 10 MG/1
10 TABLET ORAL DAILY
Refills: 0 | Status: DISCONTINUED | OUTPATIENT
Start: 2025-02-04 | End: 2025-02-28

## 2025-02-04 RX ORDER — SODIUM BICARBONATE 1 MEQ/ML
0.14 SYRINGE (ML) INTRAVENOUS
Qty: 150 | Refills: 0 | Status: DISCONTINUED | OUTPATIENT
Start: 2025-02-04 | End: 2025-02-05

## 2025-02-04 RX ORDER — MAGNESIUM SULFATE 500 MG/ML
2 SYRINGE (ML) INJECTION
Refills: 0 | Status: COMPLETED | OUTPATIENT
Start: 2025-02-04 | End: 2025-02-04

## 2025-02-04 RX ORDER — ROSUVASTATIN CALCIUM 20 MG/1
1 TABLET, FILM COATED ORAL
Refills: 0 | DISCHARGE

## 2025-02-04 RX ORDER — ROSUVASTATIN CALCIUM 20 MG/1
40 TABLET, FILM COATED ORAL AT BEDTIME
Refills: 0 | Status: DISCONTINUED | OUTPATIENT
Start: 2025-02-04 | End: 2025-02-10

## 2025-02-04 RX ORDER — METOPROLOL SUCCINATE 50 MG/1
200 TABLET, EXTENDED RELEASE ORAL DAILY
Refills: 0 | Status: DISCONTINUED | OUTPATIENT
Start: 2025-02-04 | End: 2025-02-04

## 2025-02-04 RX ADMIN — SODIUM CHLORIDE 500 MILLILITER(S): 9 INJECTION, SOLUTION INTRAVENOUS at 14:25

## 2025-02-04 RX ADMIN — SODIUM CHLORIDE 500 MILLILITER(S): 9 INJECTION, SOLUTION INTRAVENOUS at 20:30

## 2025-02-04 RX ADMIN — Medication 25 GRAM(S): at 21:45

## 2025-02-04 RX ADMIN — Medication 500 MILLILITER(S): at 21:43

## 2025-02-04 RX ADMIN — Medication 25 GRAM(S): at 14:25

## 2025-02-04 RX ADMIN — SODIUM ZIRCONIUM CYCLOSILICATE 5 GRAM(S): 5 POWDER, FOR SUSPENSION ORAL at 14:26

## 2025-02-04 RX ADMIN — Medication 100 MEQ/KG/HR: at 20:04

## 2025-02-04 RX ADMIN — NOREPINEPHRINE BITARTRATE 9.86 MICROGRAM(S)/KG/MIN: 8 SOLUTION at 23:42

## 2025-02-04 RX ADMIN — SODIUM CHLORIDE 500 MILLILITER(S): 9 INJECTION, SOLUTION INTRAVENOUS at 20:04

## 2025-02-04 RX ADMIN — ROSUVASTATIN CALCIUM 40 MILLIGRAM(S): 20 TABLET, FILM COATED ORAL at 21:45

## 2025-02-04 RX ADMIN — SODIUM CHLORIDE 1000 MILLILITER(S): 9 INJECTION, SOLUTION INTRAVENOUS at 11:22

## 2025-02-04 NOTE — H&P ADULT - NSHPLABSRESULTS_GEN_ALL_CORE
11.8   8.86  )-----------( 249      ( 04 Feb 2025 10:50 )             36.2     02-04    138  |  103  |  110[HH]  ----------------------------<  129[H]  5.6[H]   |  13[L]  |  5.3[HH]    Ca    8.3[L]      04 Feb 2025 10:50  Phos  7.9     02-04  Mg     1.1     02-04    TPro  7.0  /  Alb  4.3  /  TBili  0.9  /  DBili  x   /  AST  30  /  ALT  49[H]  /  AlkPhos  59  02-04          LIVER FUNCTIONS - ( 04 Feb 2025 10:50 )  Alb: 4.3 g/dL / Pro: 7.0 g/dL / ALK PHOS: 59 U/L / ALT: 49 U/L / AST: 30 U/L / GGT: x           Urinalysis Basic - ( 04 Feb 2025 10:50 )    Color: x / Appearance: x / SG: x / pH: x  Gluc: 129 mg/dL / Ketone: x  / Bili: x / Urobili: x   Blood: x / Protein: x / Nitrite: x   Leuk Esterase: x / RBC: x / WBC x   Sq Epi: x / Non Sq Epi: x / Bacteria: x

## 2025-02-04 NOTE — CONSULT NOTE ADULT - ASSESSMENT
Patient is a 74-year-old male with past medical history of hypertension, dyslipidemia, mitral valve regurg, A-fib on Coumadin, cardiomyopathy, CHF, and status post AICD presenting for weakness.   Nephrology consulted for MARIANNE, Hyperkalemia.     #MARIANNE on CKD stage 2 likely medication induced r/o AIN  -HF on farxiga  -Progressive decline on renal function.   -Last two blood work from outside noted w/ Cr of 1.17 in december and 3.45 in January 27th.   -Chronic Nexium use.   #Normocytic anemia; r/o MIKA.   #Hyperkalemia  #HAGMA likely euglycemic DKA from SGLT-2 inhibitor.  -Currently on Farxiga 10 as outpatient.   -Normal lactate level-1.6    Plan:  Monitor vitals, avoid hypotension.  Monitor electrolytes, potassium noted as 5.6; lokelma for potassium >5.5  Monitor strict intake and output.   -Check renal/bladder US.   -Agree with bicarbonate drip at 100 mls/hr.   Avoid PPI, if needed use   Check UA, if hematouria will do GN work up.   Check Urine protein/Creatinine ratio.   Check Urine free light chain  Check UPEP, SPEP and immunofixation  May need renal biopsy if negative studies.   Hold Farxiga for now as it may have cause euglycemic DKA.   Follow up echocardiogram.   Monitor HH, transfuse for hb<7  Avoid nephrotoxic medications and adjust all medications to GFR<30 mls/hr.   No RRT at this time.   Nephrology will follow.

## 2025-02-04 NOTE — CONSULT NOTE ADULT - ASSESSMENT
IMPRESSION:    MARIANNE unclear cause  Metabolic acidosis likely 2/2 BUN  Chronic A fib on warfarin  H/o CHF  H/o HTN    PLAN:    CNS: Avoid depressants    HEENT: Oral care    PULMONARY: On RA. HOB @ 45 degrees.  Aspiration precautions     CARDIOVASCULAR: Goal directed fluid resuscitation. Trend trops. Obtain echo.    GI: Feeding. Bowel regimen PRN    RENAL: Bicarb drip at 100/hour. Check UA, urine Na, urine Cr. Nephrology consult. Lokelma x1. Follow up lytes.  Correct as needed    INFECTIOUS DISEASE: Follow up cultures. Monitor off abx.    HEMATOLOGICAL:  DVT prophylaxis.    ENDOCRINE:  Follow up FS.  Insulin protocol if needed.    MUSCULOSKELETAL: bed rest    ICU         IMPRESSION:    MARIANNE   Hypotension   Metabolic acidosis likely 2/2 uremia   Chronic A fib on warfarin  H/o CHF  H/o HTN    PLAN:    CNS: Avoid depressants    HEENT: Oral care    PULMONARY: On RA. HOB @ 45 degrees.  Aspiration precautions.  CXR noted.      CARDIOVASCULAR: Goal directed fluid resuscitation. Trend trops. Obtain echo.  HF eval.  Hold anti hypertensives     GI: Feeding. Bowel regimen PRN    RENAL: Bicarb drip at 100/hour. Check UA, urine Na, urine Cr. Nephrology consult. Lokelma 10 mg Q8. Follow up lytes.  Correct as needed.  replace Mg.      INFECTIOUS DISEASE: Follow up cultures. Monitor off abx.  procal.  RVP     HEMATOLOGICAL:  DVT prophylaxis.  Dimer.  Monitor CBC     ENDOCRINE:  Follow up FS.  Insulin protocol if needed.    MUSCULOSKELETAL: bed rest.  off loading     ICU    GOC      IMPRESSION:    MARIANNE   Hypotension   Metabolic acidosis likely 2/2 uremia   Chronic A fib on warfarin  H/o CHF  H/o HTN    PLAN:    CNS: Avoid depressants    HEENT: Oral care    PULMONARY: On RA. HOB @ 45 degrees.  Aspiration precautions.  CXR noted.      CARDIOVASCULAR: Goal directed fluid resuscitation. Trend trops. Obtain echo.  HF eval.  Hold anti hypertensives     GI: Feeding. Bowel regimen PRN    RENAL: Bicarb drip at 100/hour. Check UA, urine Na, urine Cr. Nephrology consult. Lokelma 10 mg Q8. Follow up lytes.  Correct as needed.  replace Mg.      INFECTIOUS DISEASE: Follow up cultures. Monitor off abx.  procal.  RVP     HEMATOLOGICAL:  DVT prophylaxis.  Dimer.  Monitor CBC     ENDOCRINE:  Follow up FS.  Insulin protocol if needed. Beta hydroxybutyrate     MUSCULOSKELETAL: bed rest.  off loading     ICU    GOC

## 2025-02-04 NOTE — ED PROVIDER NOTE - OBJECTIVE STATEMENT
Patient is a 74-year-old male with past medical history of hypertension, dyslipidemia, mitral valve regurg, A-fib on Coumadin, cardiomyopathy, CHF, and status post AICD presenting for weakness. Patient endorses persistent, complaining generalized weakness, fatigue, anorexia and poor p.o. intake for the past 5-6 weeks. Patient also reports a significant weight loss. Patient  states he had recent blood work which showed worsening kidney function (increased creatine from baseline). Patient denies chest pain, shortness of breath, headache, vision changes, urinary symptoms, diarrhea, constipation, fevers, or additional concerns.

## 2025-02-04 NOTE — ED PROVIDER NOTE - EKG/XRAY ADDITIONAL INFORMATION
Independent interpretation of the xray(s) performed by Dr. Vania Gutierrez:  No infiltrate, effusion or acute abnormality.

## 2025-02-04 NOTE — ED PROVIDER NOTE - CARE PLAN
1 Principal Discharge DX:	Acute kidney failure   Principal Discharge DX:	Acute kidney failure  Secondary Diagnosis:	Hypotension

## 2025-02-04 NOTE — H&P ADULT - HISTORY OF PRESENT ILLNESS
Patient is a 74-year-old male with past medical history of hypertension, dyslipidemia, mitral valve regurg, A-fib on Coumadin, cardiomyopathy, CHF, and status post AICD presenting for weakness. Patient endorses persistent, complaining generalized weakness, fatigue, anorexia and poor p.o. intake for the past 5-6 weeks. Patient also reports a significant weight loss. Patient  states he had recent blood work which showed worsening kidney function (increased creatine from baseline).        · BP   65 mm Hg/45 mm Hg  · Heart Rate  86 /min  · Temp (C)  36.9 Degrees C oral   · SpO2 (%)  98 % on room air        Patient is a 74-year-old male with past medical history of hypertension, dyslipidemia, mitral valve regurg, A-fib on Coumadin,HFrEF with an EF of 45% in december , and status post AICD presenting for weakness. Patient endorses persistent, complaining generalized weakness, fatigue, anorexia and poor p.o. intake for the past 5-6 weeks. Patient also reports a significant weight loss. Patient  states he had recent blood work which showed worsening kidney function, creatinine was 3.1 in  january up from a baseline of 1,2 in december and currently creatinine in 5.3.         · BP   65 mm Hg/45 mm Hg  · Heart Rate  86 /min  · Temp (C)  36.9 Degrees C oral   · SpO2 (%)  98 % on room air

## 2025-02-04 NOTE — CONSULT NOTE ADULT - SUBJECTIVE AND OBJECTIVE BOX
Patient is a 74y old  Male who presents with a chief complaint of     HPI:    Patient is a 74-year-old male with past medical history of hypertension, dyslipidemia, mitral valve regurg, A-fib on Coumadin, cardiomyopathy, CHF, and status post AICD presenting for weakness. Patient endorses persistent, complaining generalized weakness, fatigue, anorexia and poor p.o. intake for the past 5-6 weeks. Patient also reports a significant weight loss. Patient  states he had recent blood work which showed worsening kidney function (increased creatine from baseline).        · BP   65 mm Hg/45 mm Hg  · Heart Rate  86 /min  · Temp (C)  36.9 Degrees C oral   · SpO2 (%)  98 % on room air      (04 Feb 2025 13:48)      PAST MEDICAL & SURGICAL HISTORY:      SOCIAL HX:   Smoking            never    FAMILY HISTORY:  :  No known cardiovacular family hisotry     Review Of Systems:     All ROS are negative except per HPI       Allergies    No Known Allergies    Intolerances          PHYSICAL EXAM    ICU Vital Signs Last 24 Hrs  T(C): 36.9 (04 Feb 2025 10:37), Max: 36.9 (04 Feb 2025 10:37)  T(F): 98.4 (04 Feb 2025 10:37), Max: 98.4 (04 Feb 2025 10:37)  HR: 86 (04 Feb 2025 10:37) (86 - 86)  BP: 65/45 (04 Feb 2025 10:37) (65/45 - 65/45)  BP(mean): --  ABP: --  ABP(mean): --  RR: 18 (04 Feb 2025 10:37) (18 - 18)  SpO2: 98% (04 Feb 2025 10:37) (98% - 98%)    O2 Parameters below as of 04 Feb 2025 10:37  Patient On (Oxygen Delivery Method): room air            CONSTITUTIONAL:  NAD    ENT:   Airway patent,     EYES:   pupils equal,   round and reactive to light.    CARDIAC:   Normal rate,   Regular rhythm.    Heart sounds S1, S2.     RESPIRATORY:   No wheezing   Normal chest expansion  No use of accessory muscles    GASTROINTESTINAL:  Abdomen soft   Non-tender,   No guarding,   + BS    MUSCULOSKELETAL:   Nno clubbing, cyanosis    NEUROLOGICAL:   Alert and oriented       SKIN:   Skin normal color for race,                 LABS:                          11.8   8.86  )-----------( 249      ( 04 Feb 2025 10:50 )             36.2                                               02-04    138  |  103  |  110[HH]  ----------------------------<  129[H]  5.6[H]   |  13[L]  |  5.3[HH]    Ca    8.3[L]      04 Feb 2025 10:50  Phos  7.9     02-04  Mg     1.1     02-04    TPro  7.0  /  Alb  4.3  /  TBili  0.9  /  DBili  x   /  AST  30  /  ALT  49[H]  /  AlkPhos  59  02-04                                             Urinalysis Basic - ( 04 Feb 2025 10:50 )    Color: x / Appearance: x / SG: x / pH: x  Gluc: 129 mg/dL / Ketone: x  / Bili: x / Urobili: x   Blood: x / Protein: x / Nitrite: x   Leuk Esterase: x / RBC: x / WBC x   Sq Epi: x / Non Sq Epi: x / Bacteria: x                                                  LIVER FUNCTIONS - ( 04 Feb 2025 10:50 )  Alb: 4.3 g/dL / Pro: 7.0 g/dL / ALK PHOS: 59 U/L / ALT: 49 U/L / AST: 30 U/L / GGT: x                                                                                                                                       X-Rays reviewed:                                                                                    ECHO    CXR interpreted by me:    MEDICATIONS  (STANDING):  lactated ringers Bolus 500 milliLiter(s) IV Bolus once  lactated ringers Bolus 500 milliLiter(s) IV Bolus once  magnesium sulfate  IVPB 2 Gram(s) IV Intermittent Once  sodium zirconium cyclosilicate 5 Gram(s) Oral Once    MEDICATIONS  (PRN):

## 2025-02-04 NOTE — ED PROVIDER NOTE - INPATIENT RESIDENT/ACP NOTIFIED
Gen: alert, NAD  HEENT:  NC/AT, PERR, no exophthalmos  CV:  well perfused, rrr   Pulm:  normal RR, breathing comfortably, CTA b/l  Abd: s/nt/nd  MSK: moving all extremities  Neuro:  non-focal  Skin:  visualized areas intact  Psych: AOx3 ICU RESIDENT

## 2025-02-04 NOTE — PATIENT PROFILE ADULT - FALL HARM RISK - HARM RISK INTERVENTIONS

## 2025-02-04 NOTE — CONSULT NOTE ADULT - SUBJECTIVE AND OBJECTIVE BOX
NEPHROLOGY CONSULTATION NOTE    Patient is a 74-year-old male with past medical history of hypertension, dyslipidemia, mitral valve regurg, A-fib on Coumadin, cardiomyopathy, CHF, and status post AICD presenting for weakness. Patient endorses persistent, complaining generalized weakness, fatigue, anorexia and poor p.o. intake for the past 5-6 weeks. Patient also reports a significant weight loss. Patient  states he had recent blood work which showed worsening kidney function (increased creatine from baseline).  --------------------  Above information obtained from admission H&P.  Nephrology was consulted for MARIANNE.   -Last two blood work blood work from outside in  w/ creatinine of 1.17 // 2025 with creatinine of 3.45  Patient reports came to ED because of chronic weakness since new year.   Patient was seen and examined at bedside.   Patient is comfortable, breathing on RA.   Reports anorexia, weakness, decreased urination since New year.   -BP been soft with good MAP of 64.   -Received fluid boluses of 2 Litre.   -Bedside bladder scan w/o urinary retention.   Patient denied OTC medications, no NSAIDS    PAST MEDICAL & SURGICAL HISTORY:    Allergies:  No Known Allergies    Home Medications:  Aldactone 25 mg oral tablet: 1 tab(s) orally once a day (at bedtime) (2025 15:09)  Entresto 97 mg-103 mg oral tablet: 1 tab(s) orally 2 times a day (2025 15:09)  ezetimibe 10 mg oral tablet: 1 tab(s) orally once a day (2025 15:09)  Farxiga 10 mg oral tablet: 1 tab(s) orally once a day (2025 15:09)  metoprolol succinate 100 mg oral tablet, extended release: 1 tab(s) orally once a day (in the afternoon) (2025 15:09)  metoprolol succinate 200 mg oral capsule, extended release: 1 cap(s) orally once a day (in the morning) (2025 15:10)  NexIUM 20 mg oral delayed release capsule: 1 cap(s) orally once a day (at bedtime) (2025 15:10)  rosuvastatin 40 mg oral tablet: 1 tab(s) orally once a day (2025 15:10)  warfarin 5 mg oral tablet: 1 tab(s) orally once a day (2025 15:10)    Hospital Medications:   MEDICATIONS  (STANDING):  ezetimibe 10 milliGRAM(s) Oral daily  metoprolol succinate  milliGRAM(s) Oral daily  pantoprazole    Tablet 40 milliGRAM(s) Oral before breakfast  rosuvastatin 40 milliGRAM(s) Oral at bedtime    SOCIAL HISTORY:  Denies ETOH,Smoking,   FAMILY HISTORY:    REVIEW OF SYSTEMS:  CONSTITUTIONAL: Feeling weak since January  RESPIRATORY: No cough, wheezing, hemoptysis; No shortness of breath  CARDIOVASCULAR: No chest pain or palpitations.  GASTROINTESTINAL: Poor appetite, intermittently getting diarrhea, No abdominal or epigastric pain. No nausea, vomiting, or hematemesis; No melena or hematochezia.  GENITOURINARY: No dysuria, frequency, foamy urine, urinary urgency, incontinence or hematuria  NEUROLOGICAL: No numbness or weakness  SKIN: No itching, burning, rashes, or lesions   VASCULAR: No bilateral lower extremity edema.   All other review of systems is negative unless indicated above.    VITALS:  Vital Signs Last 24 Hrs  T(C): 36.9 (2025 10:37), Max: 36.9 (2025 10:37)  T(F): 98.4 (2025 10:37), Max: 98.4 (2025 10:37)  HR: 86 (2025 10:37) (86 - 86)  BP: 65/45 (2025 10:37) (65/45 - 65/45)  BP(mean): --  RR: 18 (2025 10:37) (18 - 18)  SpO2: 98% (2025 10:37) (98% - 98%)    Parameters below as of 2025 10:37  Patient On (Oxygen Delivery Method): room air    Height (cm): 190.5 ( @ 10:37)  Weight (kg): 106.6 ( @ 10:37)  BMI (kg/m2): 29.4 ( @ 10:37)  BSA (m2): 2.35 ( @ 10:37)    PHYSICAL EXAM:  Constitutional: NAD  Respiratory: CTAB, no wheezes, rales or rhonchi  Cardiovascular: S1, S2, RRR  Gastrointestinal: BS+, soft, NT/ND  Extremities: No cyanosis or clubbing. No peripheral edema  Neurological: A/O x 3, no focal deficits  Psychiatric: Normal mood, normal affect  : No CVA tenderness. No kilgore.   Skin: No rashes  Vascular Access:    LABS:      138  |  103  |  110[HH]  ----------------------------<  129[H]  5.6[H]   |  13[L]  |  5.3[HH]    Ca    8.3[L]      2025 10:50  Phos  7.9     02-04  Mg     1.1     -04    TPro  7.0  /  Alb  4.3  /  TBili  0.9  /  DBili      /  AST  30  /  ALT  49[H]  /  AlkPhos  59  02-04    Creatinine Trend: 5.3 <--                        11.8   8.86  )-----------( 249      ( 2025 10:50 )             36.2     Urine Studies:  Urinalysis Basic - ( 2025 14:14 )    Color: Yellow / Appearance: Clear / S.013 / pH:   Gluc:  / Ketone: Negative mg/dL  / Bili: Negative / Urobili: 0.2 mg/dL   Blood:  / Protein: 100 mg/dL / Nitrite: Negative   Leuk Esterase: Negative / RBC: 1 /HPF / WBC 2 /HPF   Sq Epi:  / Non Sq Epi: 3 /HPF / Bacteria: Negative /HPF    RADIOLOGY & ADDITIONAL STUDIES:                 NEPHROLOGY CONSULTATION NOTE    Patient is a 74-year-old male with past medical history of hypertension, dyslipidemia, mitral valve regurg, A-fib on Coumadin, cardiomyopathy, CHF, and status post AICD presenting for weakness. Patient endorses persistent, complaining generalized weakness, fatigue, anorexia and poor p.o. intake for the past 5-6 weeks. Patient also reports a significant weight loss. Patient  states he had recent blood work which showed worsening kidney function (increased creatine from baseline).  --------------------  Above information obtained from admission H&P.  Nephrology was consulted for MARIANNE.   -Last two blood work blood work from outside in  w/ creatinine of 1.17 // 2025 with creatinine of 3.45  Patient reports came to ED because of chronic weakness since new year.   Patient was seen and examined at bedside.   Patient is comfortable, breathing on RA.   Reports anorexia, weakness, decreased urination since New year.   -BP been soft with good MAP of 64.   -Received fluid boluses of 2 Litre.   -Bedside bladder scan w/o urinary retention.   Patient denied OTC medications, no NSAIDS    PAST MEDICAL & SURGICAL HISTORY:    Allergies:  No Known Allergies    Home Medications:  Aldactone 25 mg oral tablet: 1 tab(s) orally once a day (at bedtime) (2025 15:09)  Entresto 97 mg-103 mg oral tablet: 1 tab(s) orally 2 times a day (2025 15:09)  ezetimibe 10 mg oral tablet: 1 tab(s) orally once a day (2025 15:09)  Farxiga 10 mg oral tablet: 1 tab(s) orally once a day (2025 15:09)  metoprolol succinate 100 mg oral tablet, extended release: 1 tab(s) orally once a day (in the afternoon) (2025 15:09)  metoprolol succinate 200 mg oral capsule, extended release: 1 cap(s) orally once a day (in the morning) (2025 15:10)  NexIUM 20 mg oral delayed release capsule: 1 cap(s) orally once a day (at bedtime) (2025 15:10)  rosuvastatin 40 mg oral tablet: 1 tab(s) orally once a day (2025 15:10)  warfarin 5 mg oral tablet: 1 tab(s) orally once a day (2025 15:10)    Hospital Medications:   MEDICATIONS  (STANDING):  ezetimibe 10 milliGRAM(s) Oral daily  metoprolol succinate  milliGRAM(s) Oral daily  pantoprazole    Tablet 40 milliGRAM(s) Oral before breakfast  rosuvastatin 40 milliGRAM(s) Oral at bedtime    SOCIAL HISTORY:  Denies ETOH,Smoking,   FAMILY HISTORY:    REVIEW OF SYSTEMS:  CONSTITUTIONAL: Feeling weak since January  RESPIRATORY: No cough, wheezing, hemoptysis; No shortness of breath  CARDIOVASCULAR: No chest pain or palpitations.  GASTROINTESTINAL: Poor appetite, intermittently getting diarrhea, No abdominal or epigastric pain. No nausea, vomiting, or hematemesis; No melena or hematochezia.  GENITOURINARY: No dysuria, frequency, foamy urine, urinary urgency, incontinence or hematuria  NEUROLOGICAL: No numbness or weakness  SKIN: No itching, burning, rashes, or lesions   VASCULAR: No bilateral lower extremity edema.   All other review of systems is negative unless indicated above.    VITALS:  Vital Signs Last 24 Hrs  T(C): 36.9 (2025 10:37), Max: 36.9 (2025 10:37)  T(F): 98.4 (2025 10:37), Max: 98.4 (2025 10:37)  HR: 86 (2025 10:37) (86 - 86)  BP: 65/45 (2025 10:37) (65/45 - 65/45)  BP(mean): --  RR: 18 (2025 10:37) (18 - 18)  SpO2: 98% (2025 10:37) (98% - 98%)    Parameters below as of 2025 10:37  Patient On (Oxygen Delivery Method): room air    Height (cm): 190.5 ( @ 10:37)  Weight (kg): 106.6 ( @ 10:37)  BMI (kg/m2): 29.4 ( @ 10:37)  BSA (m2): 2.35 ( @ 10:37)    PHYSICAL EXAM:  Constitutional: NAD  Respiratory: CTAB, no wheezes, rales or rhonchi  Cardiovascular: S1, S2, RRR  Gastrointestinal: BS+, soft, NT/ND  Extremities: No cyanosis or clubbing. No peripheral edema  Neurological: A/O x 3, no focal deficits  Skin: No rashes  Vascular Access:    LABS:      138  |  103  |  110[HH]  ----------------------------<  129[H]  5.6[H]   |  13[L]  |  5.3[HH]    Ca    8.3[L]      2025 10:50  Phos  7.9     02-04  Mg     1.1     -04    TPro  7.0  /  Alb  4.3  /  TBili  0.9  /  DBili      /  AST  30  /  ALT  49[H]  /  AlkPhos  59  02-04    Creatinine Trend: 5.3 <--                        11.8   8.86  )-----------( 249      ( 2025 10:50 )             36.2     Urine Studies:  Urinalysis Basic - ( 2025 14:14 )    Color: Yellow / Appearance: Clear / S.013 / pH:   Gluc:  / Ketone: Negative mg/dL  / Bili: Negative / Urobili: 0.2 mg/dL   Blood:  / Protein: 100 mg/dL / Nitrite: Negative   Leuk Esterase: Negative / RBC: 1 /HPF / WBC 2 /HPF   Sq Epi:  / Non Sq Epi: 3 /HPF / Bacteria: Negative /HPF    RADIOLOGY & ADDITIONAL STUDIES:

## 2025-02-04 NOTE — ED PROVIDER NOTE - NSCAREINITIATED _GEN_ER
Vania Gutierrez(Attending) Rituxan Counseling:  I discussed with the patient the risks of Rituxan infusions. Side effects can include infusion reactions, severe drug rashes including mucocutaneous reactions, reactivation of latent hepatitis and other infections and rarely progressive multifocal leukoencephalopathy.  All of the patient's questions and concerns were addressed.

## 2025-02-04 NOTE — H&P ADULT - NSHPPHYSICALEXAM_GEN_ALL_CORE
PHYSICAL EXAM:      Constitutional:    Eyes:    ENMT:    Neck:    Breasts:    Back:    Respiratory:    Cardiovascular:    Gastrointestinal:    Genitourinary:    Rectal:    Extremities:    Vascular:    Neurological:    Skin:    Lymph Nodes:    Musculoskeletal:    Psychiatric: PHYSICAL EXAM:      Constitutional:Does not look  in acute distess, comfortable on room air       Neck:Supple neck       Back:No point tenderness in the back     Respiratory:GBAE, minimal crackles at the bases     Cardiovascular:Irregular s1 s2     Gastrointestinal:Soft non tender non distended abdomen     Extremities:Minimal pitting lower limb edema     Neurological:No focal neurological deficit, awake alert and oriented     Musculoskeletal:Motor power 5/5 in all extremities

## 2025-02-04 NOTE — H&P ADULT - ASSESSMENT
Assessment & Plan     #Neuro    #CV    #PULMONARY     #INFECTIOUS DISEASE     #GI    #RENAL    #Endocrine     #Hematology     #DVT AND GI PROPHYLAXIS    #Code Status: Full code     Assessment & Plan   MARIANNE unclear cause  Metabolic acidosis likely 2/2 BUN  Chronic A fib on warfarin  H/o CHF  H/o HTN    #Neuro  Avoid depressants    #CV  Goal directed fluid resuscitation Received a total fluid amount of 2L . Trop at 56 , will repeat at 4 pm .Echo ordered   Will hold metoprolol in a setting of low BP     #PULMONARY   On RA. HOB @ 45 degrees.  Aspiration precautions     #INFECTIOUS DISEASE   Follow up cultures. Blood cultures ordered.  Monitor off abx.will order procal     #GI  Feeding. Bowel regimen PRN    #RENAL  MARIANNE , baseline creat 1.2 in december , trended up to 3 in january  Will stop home dose of spironolactone  Will stop home dose of enteresto  Will stop   Will order urine studies ans start bicarb drip at 100cc/hr   Will cosnult nephro  Lokelma to correct hyperkalemia     #Endocrine   Follow up FS.  Insulin protocol if needed.    #Hematology   PAtient on coumadin for afib   resume couamdin according to INR results     #DVT AND GI PROPHYLAXIS  Will start on pantoprazole   On coumadin for afib          Assessment & Plan   MARIANNE unclear cause  Metabolic acidosis likely 2/2 BUN  Chronic A fib on warfarin  H/o CHF  H/o HTN    #Neuro  Avoid depressants    #CV  Goal directed fluid resuscitation Received a total fluid amount of 2L . Trop at 56 , will repeat at 4 pm .Echo ordered   Will hold metoprolol in a setting of low BP     #PULMONARY   On RA. HOB @ 45 degrees.  Aspiration precautions     #INFECTIOUS DISEASE   Follow up cultures. Blood cultures ordered.  Monitor off abx.will order procal     #GI  Feeding. Bowel regimen PRN    #RENAL  MARIANNE , baseline creat 1.2 in december , trended up to 3 in january  Will stop home dose of spironolactone  Will stop home dose of enteresto  Will stop   Will order urine studies ans start bicarb drip at 100cc/hr   Will cosnult nephro  Lokelma to correct hyperkalemia     #Endocrine   Follow up FS.  Insulin protocol if needed.    #Hematology   PAtient on warfarin  for afib   resume warfarin  according to INR results     #DVT AND GI PROPHYLAXIS  Will start on pantoprazole   On warfarin  for afib

## 2025-02-04 NOTE — ED PROVIDER NOTE - ATTENDING CONTRIBUTION TO CARE
74-year-old man past medical history significant for hypertension, dyslipidemia, mitral valve regurg, A-fib on Coumadin, cardiomyopathy, CHF, status post AICD, complaining of 5-6 weeks of generalized weakness, anorexia and poor p.o. intake.  Patient reports a significant weight loss.  No recent change in medication.  Patient reports his kidney function has been declining over the past few weeks as followed by his cardiologist, Dr. Cunningham.  Normal urination.  No abdominal pain or back pain.  No chest pain or shortness of breath.  No leg edema.  Normal BMs.  No nausea, vomiting.  Vitals noted.  CONSTITUTIONAL: Well-appearing; well-nourished; in no apparent distress.   HEAD: Normocephalic; atraumatic.   EYES: PERRL; EOM intact. Conjunctiva normal B/L.   ENT: Normal pharynx with no tonsillar hypertrophy. + dry mucous membranes.   NECK: Supple; non-tender; no cervical lymphadenopathy.   CHEST: Normal chest excursion with respiration. + AICD.  CARDIOVASCULAR: Normal S1, S2; no murmurs, rubs, or gallops.   RESPIRATORY: Normal chest excursion with respiration; breath sounds clear and equal bilaterally; no wheezes, rhonchi, or rales.  GI/: Normal bowel sounds; non-distended; non-tender.  BACK: No evidence of trauma or deformity. Non-tender to palpation. No CVA tenderness.   EXT: Normal ROM in all four extremities; non-tender to palpation; distal pulses are normal. No leg edema B/L.   SKIN: Normal for age and race; warm; dry; good turgor.  NEURO: A & O x 4; CN 2-12 intact. Grossly unremarkable.

## 2025-02-04 NOTE — ED PROVIDER NOTE - PHYSICAL EXAMINATION
VITAL SIGNS: I have reviewed nursing notes and confirm.  CONSTITUTIONAL: Well-appearing, non-toxic, in NAD  SKIN: Warm dry, normal skin turgor  HEAD: NCAT  EYES: No conjunctival injection, scleral anicteric  ENT: Moist mucous membranes, normal pharynx with no erythema or exudates  NECK: Supple; full ROM. Nontender. No cervical LAD  CARD: irregular, no murmurs, rubs or gallops  RESP: Clear to ausculation bilaterally.  No rales, rhonchi, or wheezing.  ABD: Soft, non-distended, non-tender, no rebound or guarding. No CVA tenderness  EXT: Full ROM, no bony tenderness, no pedal edema, no calf tenderness  NEURO: Normal motor, normal sensory.

## 2025-02-05 LAB
ALBUMIN SERPL ELPH-MCNC: 3.6 G/DL — SIGNIFICANT CHANGE UP (ref 3.5–5.2)
ALBUMIN SERPL ELPH-MCNC: 3.8 G/DL — SIGNIFICANT CHANGE UP (ref 3.5–5.2)
ALBUMIN SERPL ELPH-MCNC: 3.9 G/DL — SIGNIFICANT CHANGE UP (ref 3.5–5.2)
ALBUMIN SERPL ELPH-MCNC: 4 G/DL — SIGNIFICANT CHANGE UP (ref 3.5–5.2)
ALP SERPL-CCNC: 47 U/L — SIGNIFICANT CHANGE UP (ref 30–115)
ALP SERPL-CCNC: 51 U/L — SIGNIFICANT CHANGE UP (ref 30–115)
ALP SERPL-CCNC: 54 U/L — SIGNIFICANT CHANGE UP (ref 30–115)
ALP SERPL-CCNC: 59 U/L — SIGNIFICANT CHANGE UP (ref 30–115)
ALT FLD-CCNC: 39 U/L — SIGNIFICANT CHANGE UP (ref 0–41)
ALT FLD-CCNC: 44 U/L — HIGH (ref 0–41)
ALT FLD-CCNC: 47 U/L — HIGH (ref 0–41)
ALT FLD-CCNC: 48 U/L — HIGH (ref 0–41)
ANION GAP SERPL CALC-SCNC: 14 MMOL/L — SIGNIFICANT CHANGE UP (ref 7–14)
ANION GAP SERPL CALC-SCNC: 19 MMOL/L — HIGH (ref 7–14)
ANION GAP SERPL CALC-SCNC: 21 MMOL/L — HIGH (ref 7–14)
APPEARANCE UR: CLEAR — SIGNIFICANT CHANGE UP
APTT BLD: 69 SEC — HIGH (ref 27–39.2)
AST SERPL-CCNC: 22 U/L — SIGNIFICANT CHANGE UP (ref 0–41)
AST SERPL-CCNC: 30 U/L — SIGNIFICANT CHANGE UP (ref 0–41)
AST SERPL-CCNC: 31 U/L — SIGNIFICANT CHANGE UP (ref 0–41)
AST SERPL-CCNC: 32 U/L — SIGNIFICANT CHANGE UP (ref 0–41)
B-OH-BUTYR SERPL-SCNC: <0.2 MMOL/L — SIGNIFICANT CHANGE UP
BASOPHILS # BLD AUTO: 0.05 K/UL — SIGNIFICANT CHANGE UP (ref 0–0.2)
BASOPHILS # BLD AUTO: 0.05 K/UL — SIGNIFICANT CHANGE UP (ref 0–0.2)
BASOPHILS NFR BLD AUTO: 0.5 % — SIGNIFICANT CHANGE UP (ref 0–1)
BASOPHILS NFR BLD AUTO: 0.7 % — SIGNIFICANT CHANGE UP (ref 0–1)
BILIRUB SERPL-MCNC: 0.5 MG/DL — SIGNIFICANT CHANGE UP (ref 0.2–1.2)
BILIRUB SERPL-MCNC: 0.6 MG/DL — SIGNIFICANT CHANGE UP (ref 0.2–1.2)
BILIRUB SERPL-MCNC: 0.7 MG/DL — SIGNIFICANT CHANGE UP (ref 0.2–1.2)
BILIRUB SERPL-MCNC: 0.8 MG/DL — SIGNIFICANT CHANGE UP (ref 0.2–1.2)
BILIRUB UR-MCNC: NEGATIVE — SIGNIFICANT CHANGE UP
BUN SERPL-MCNC: 74 MG/DL — CRITICAL HIGH (ref 10–20)
BUN SERPL-MCNC: 75 MG/DL — CRITICAL HIGH (ref 10–20)
BUN SERPL-MCNC: 81 MG/DL — CRITICAL HIGH (ref 10–20)
BUN SERPL-MCNC: 92 MG/DL — CRITICAL HIGH (ref 10–20)
CALCIUM SERPL-MCNC: 7.4 MG/DL — LOW (ref 8.4–10.5)
CALCIUM SERPL-MCNC: 7.5 MG/DL — LOW (ref 8.4–10.5)
CALCIUM SERPL-MCNC: 7.6 MG/DL — LOW (ref 8.4–10.5)
CALCIUM SERPL-MCNC: 7.8 MG/DL — LOW (ref 8.4–10.4)
CHLORIDE SERPL-SCNC: 106 MMOL/L — SIGNIFICANT CHANGE UP (ref 98–110)
CHLORIDE SERPL-SCNC: 107 MMOL/L — SIGNIFICANT CHANGE UP (ref 98–110)
CHLORIDE SERPL-SCNC: 110 MMOL/L — SIGNIFICANT CHANGE UP (ref 98–110)
CHLORIDE SERPL-SCNC: 110 MMOL/L — SIGNIFICANT CHANGE UP (ref 98–110)
CO2 SERPL-SCNC: 14 MMOL/L — LOW (ref 17–32)
CO2 SERPL-SCNC: 16 MMOL/L — LOW (ref 17–32)
CO2 SERPL-SCNC: 17 MMOL/L — SIGNIFICANT CHANGE UP (ref 17–32)
CO2 SERPL-SCNC: 18 MMOL/L — SIGNIFICANT CHANGE UP (ref 17–32)
COLOR SPEC: YELLOW — SIGNIFICANT CHANGE UP
CREAT ?TM UR-MCNC: 46 MG/DL — SIGNIFICANT CHANGE UP
CREAT SERPL-MCNC: 2.4 MG/DL — HIGH (ref 0.7–1.5)
CREAT SERPL-MCNC: 2.7 MG/DL — HIGH (ref 0.7–1.5)
CREAT SERPL-MCNC: 3.2 MG/DL — HIGH (ref 0.7–1.5)
CREAT SERPL-MCNC: 3.8 MG/DL — HIGH (ref 0.7–1.5)
DIFF PNL FLD: ABNORMAL
EGFR: 16 ML/MIN/1.73M2 — LOW
EGFR: 20 ML/MIN/1.73M2 — LOW
EGFR: 24 ML/MIN/1.73M2 — LOW
EGFR: 28 ML/MIN/1.73M2 — LOW
EOSINOPHIL # BLD AUTO: 0.09 K/UL — SIGNIFICANT CHANGE UP (ref 0–0.7)
EOSINOPHIL # BLD AUTO: 0.18 K/UL — SIGNIFICANT CHANGE UP (ref 0–0.7)
EOSINOPHIL NFR BLD AUTO: 0.9 % — SIGNIFICANT CHANGE UP (ref 0–8)
EOSINOPHIL NFR BLD AUTO: 2.4 % — SIGNIFICANT CHANGE UP (ref 0–8)
GAS PNL BLDA: SIGNIFICANT CHANGE UP
GLUCOSE SERPL-MCNC: 115 MG/DL — HIGH (ref 70–99)
GLUCOSE SERPL-MCNC: 122 MG/DL — HIGH (ref 70–99)
GLUCOSE SERPL-MCNC: 150 MG/DL — HIGH (ref 70–99)
GLUCOSE SERPL-MCNC: 171 MG/DL — HIGH (ref 70–99)
GLUCOSE UR QL: 500 MG/DL
HCT VFR BLD CALC: 32.9 % — LOW (ref 42–52)
HCT VFR BLD CALC: 33.2 % — LOW (ref 42–52)
HGB BLD-MCNC: 11.2 G/DL — LOW (ref 14–18)
HGB BLD-MCNC: 11.3 G/DL — LOW (ref 14–18)
IMM GRANULOCYTES NFR BLD AUTO: 0.1 % — SIGNIFICANT CHANGE UP (ref 0.1–0.3)
IMM GRANULOCYTES NFR BLD AUTO: 0.3 % — SIGNIFICANT CHANGE UP (ref 0.1–0.3)
INR BLD: 10.47 RATIO — CRITICAL HIGH (ref 0.65–1.3)
INR BLD: 7.88 RATIO — CRITICAL HIGH (ref 0.65–1.3)
KETONES UR-MCNC: NEGATIVE MG/DL — SIGNIFICANT CHANGE UP
LACTATE SERPL-SCNC: 1.2 MMOL/L — SIGNIFICANT CHANGE UP (ref 0.7–2)
LEUKOCYTE ESTERASE UR-ACNC: NEGATIVE — SIGNIFICANT CHANGE UP
LYMPHOCYTES # BLD AUTO: 0.95 K/UL — LOW (ref 1.2–3.4)
LYMPHOCYTES # BLD AUTO: 1.5 K/UL — SIGNIFICANT CHANGE UP (ref 1.2–3.4)
LYMPHOCYTES # BLD AUTO: 20.1 % — LOW (ref 20.5–51.1)
LYMPHOCYTES # BLD AUTO: 9.9 % — LOW (ref 20.5–51.1)
MAGNESIUM SERPL-MCNC: 1.3 MG/DL — LOW (ref 1.8–2.4)
MAGNESIUM SERPL-MCNC: 2.4 MG/DL — SIGNIFICANT CHANGE UP (ref 1.8–2.4)
MAGNESIUM SERPL-MCNC: 2.6 MG/DL — HIGH (ref 1.8–2.4)
MCHC RBC-ENTMCNC: 30 PG — SIGNIFICANT CHANGE UP (ref 27–31)
MCHC RBC-ENTMCNC: 30.3 PG — SIGNIFICANT CHANGE UP (ref 27–31)
MCHC RBC-ENTMCNC: 34 G/DL — SIGNIFICANT CHANGE UP (ref 32–37)
MCHC RBC-ENTMCNC: 34 G/DL — SIGNIFICANT CHANGE UP (ref 32–37)
MCV RBC AUTO: 88.2 FL — SIGNIFICANT CHANGE UP (ref 80–94)
MCV RBC AUTO: 89 FL — SIGNIFICANT CHANGE UP (ref 80–94)
MONOCYTES # BLD AUTO: 0.87 K/UL — HIGH (ref 0.1–0.6)
MONOCYTES # BLD AUTO: 0.93 K/UL — HIGH (ref 0.1–0.6)
MONOCYTES NFR BLD AUTO: 11.6 % — HIGH (ref 1.7–9.3)
MONOCYTES NFR BLD AUTO: 9.7 % — HIGH (ref 1.7–9.3)
MRSA PCR RESULT.: NEGATIVE — SIGNIFICANT CHANGE UP
NEUTROPHILS # BLD AUTO: 4.87 K/UL — SIGNIFICANT CHANGE UP (ref 1.4–6.5)
NEUTROPHILS # BLD AUTO: 7.53 K/UL — HIGH (ref 1.4–6.5)
NEUTROPHILS NFR BLD AUTO: 65.1 % — SIGNIFICANT CHANGE UP (ref 42.2–75.2)
NEUTROPHILS NFR BLD AUTO: 78.7 % — HIGH (ref 42.2–75.2)
NITRITE UR-MCNC: NEGATIVE — SIGNIFICANT CHANGE UP
NRBC # BLD: 0 /100 WBCS — SIGNIFICANT CHANGE UP (ref 0–0)
NRBC # BLD: 0 /100 WBCS — SIGNIFICANT CHANGE UP (ref 0–0)
NRBC BLD-RTO: 0 /100 WBCS — SIGNIFICANT CHANGE UP (ref 0–0)
NRBC BLD-RTO: 0 /100 WBCS — SIGNIFICANT CHANGE UP (ref 0–0)
OSMOLALITY UR: 358 MOS/KG — SIGNIFICANT CHANGE UP (ref 50–1200)
PH UR: 5 — SIGNIFICANT CHANGE UP (ref 5–8)
PHOSPHATE SERPL-MCNC: 3.2 MG/DL — SIGNIFICANT CHANGE UP (ref 2.1–4.9)
PLATELET # BLD AUTO: 265 K/UL — SIGNIFICANT CHANGE UP (ref 130–400)
PLATELET # BLD AUTO: 270 K/UL — SIGNIFICANT CHANGE UP (ref 130–400)
PMV BLD: 11.7 FL — HIGH (ref 7.4–10.4)
PMV BLD: 11.9 FL — HIGH (ref 7.4–10.4)
POTASSIUM SERPL-MCNC: 4.1 MMOL/L — SIGNIFICANT CHANGE UP (ref 3.5–5)
POTASSIUM SERPL-MCNC: 4.3 MMOL/L — SIGNIFICANT CHANGE UP (ref 3.5–5)
POTASSIUM SERPL-MCNC: 4.4 MMOL/L — SIGNIFICANT CHANGE UP (ref 3.5–5)
POTASSIUM SERPL-MCNC: 4.5 MMOL/L — SIGNIFICANT CHANGE UP (ref 3.5–5)
POTASSIUM SERPL-SCNC: 4.1 MMOL/L — SIGNIFICANT CHANGE UP (ref 3.5–5)
POTASSIUM SERPL-SCNC: 4.3 MMOL/L — SIGNIFICANT CHANGE UP (ref 3.5–5)
POTASSIUM SERPL-SCNC: 4.4 MMOL/L — SIGNIFICANT CHANGE UP (ref 3.5–5)
POTASSIUM SERPL-SCNC: 4.5 MMOL/L — SIGNIFICANT CHANGE UP (ref 3.5–5)
POTASSIUM UR-SCNC: 9 MMOL/L — SIGNIFICANT CHANGE UP
PROCALCITONIN SERPL-MCNC: 0.42 NG/ML — HIGH (ref 0.02–0.1)
PROT ?TM UR-MCNC: 28 MG/DLG/24H — SIGNIFICANT CHANGE UP
PROT SERPL-MCNC: 5.6 G/DL — LOW (ref 6–8)
PROT SERPL-MCNC: 5.9 G/DL — LOW (ref 6–8)
PROT SERPL-MCNC: 6 G/DL — SIGNIFICANT CHANGE UP (ref 6–8)
PROT SERPL-MCNC: 6.4 G/DL — SIGNIFICANT CHANGE UP (ref 6–8)
PROT UR-MCNC: 30 MG/DL
PROT/CREAT UR-RTO: 0.6 RATIO — HIGH (ref 0–0.2)
PROTHROM AB SERPL-ACNC: >40 SEC — HIGH (ref 9.95–12.87)
PROTHROM AB SERPL-ACNC: >40 SEC — HIGH (ref 9.95–12.87)
RBC # BLD: 3.73 M/UL — LOW (ref 4.7–6.1)
RBC # BLD: 3.73 M/UL — LOW (ref 4.7–6.1)
RBC # FLD: 12.6 % — SIGNIFICANT CHANGE UP (ref 11.5–14.5)
RBC # FLD: 12.7 % — SIGNIFICANT CHANGE UP (ref 11.5–14.5)
SODIUM SERPL-SCNC: 141 MMOL/L — SIGNIFICANT CHANGE UP (ref 135–146)
SODIUM SERPL-SCNC: 145 MMOL/L — SIGNIFICANT CHANGE UP (ref 135–146)
SODIUM UR-SCNC: 59 MMOL/L — SIGNIFICANT CHANGE UP
SP GR SPEC: 1.01 — SIGNIFICANT CHANGE UP (ref 1–1.03)
TROPONIN T, HIGH SENSITIVITY RESULT: 33 NG/L — HIGH (ref 6–21)
UROBILINOGEN FLD QL: 0.2 MG/DL — SIGNIFICANT CHANGE UP (ref 0.2–1)
UUN UR-MCNC: 456 MG/DL — SIGNIFICANT CHANGE UP
VANCOMYCIN FLD-MCNC: 22.2 UG/ML — HIGH (ref 5–10)
WBC # BLD: 7.48 K/UL — SIGNIFICANT CHANGE UP (ref 4.8–10.8)
WBC # BLD: 9.58 K/UL — SIGNIFICANT CHANGE UP (ref 4.8–10.8)
WBC # FLD AUTO: 7.48 K/UL — SIGNIFICANT CHANGE UP (ref 4.8–10.8)
WBC # FLD AUTO: 9.58 K/UL — SIGNIFICANT CHANGE UP (ref 4.8–10.8)

## 2025-02-05 PROCEDURE — 76770 US EXAM ABDO BACK WALL COMP: CPT | Mod: 26

## 2025-02-05 PROCEDURE — 71045 X-RAY EXAM CHEST 1 VIEW: CPT | Mod: 26,77

## 2025-02-05 PROCEDURE — 71045 X-RAY EXAM CHEST 1 VIEW: CPT | Mod: 26

## 2025-02-05 PROCEDURE — 99222 1ST HOSP IP/OBS MODERATE 55: CPT

## 2025-02-05 PROCEDURE — 99223 1ST HOSP IP/OBS HIGH 75: CPT

## 2025-02-05 PROCEDURE — 99291 CRITICAL CARE FIRST HOUR: CPT

## 2025-02-05 RX ORDER — VANCOMYCIN HCL IN 5 % DEXTROSE 1.5G/250ML
1500 PLASTIC BAG, INJECTION (ML) INTRAVENOUS ONCE
Refills: 0 | Status: COMPLETED | OUTPATIENT
Start: 2025-02-05 | End: 2025-02-05

## 2025-02-05 RX ORDER — AMIODARONE HYDROCHLORIDE 50 MG/ML
1 INJECTION, SOLUTION INTRAVENOUS
Qty: 450 | Refills: 0 | Status: DISCONTINUED | OUTPATIENT
Start: 2025-02-05 | End: 2025-02-06

## 2025-02-05 RX ORDER — AMIODARONE HYDROCHLORIDE 50 MG/ML
0.5 INJECTION, SOLUTION INTRAVENOUS
Qty: 450 | Refills: 0 | Status: DISCONTINUED | OUTPATIENT
Start: 2025-02-05 | End: 2025-02-06

## 2025-02-05 RX ORDER — SODIUM BICARBONATE 1 MEQ/ML
650 SYRINGE (ML) INTRAVENOUS EVERY 8 HOURS
Refills: 0 | Status: DISCONTINUED | OUTPATIENT
Start: 2025-02-05 | End: 2025-02-07

## 2025-02-05 RX ORDER — CEFEPIME 2 G/20ML
2000 INJECTION, POWDER, FOR SOLUTION INTRAVENOUS EVERY 24 HOURS
Refills: 0 | Status: DISCONTINUED | OUTPATIENT
Start: 2025-02-05 | End: 2025-02-06

## 2025-02-05 RX ORDER — VASOPRESSIN 20 [USP'U]/ML
0.04 INJECTION INTRAVENOUS
Qty: 40 | Refills: 0 | Status: DISCONTINUED | OUTPATIENT
Start: 2025-02-05 | End: 2025-02-07

## 2025-02-05 RX ORDER — SODIUM CHLORIDE 9 G/1000ML
1000 INJECTION, SOLUTION INTRAVENOUS
Refills: 0 | Status: DISCONTINUED | OUTPATIENT
Start: 2025-02-05 | End: 2025-02-06

## 2025-02-05 RX ORDER — PHENYLEPHRINE HCL IN 0.9% NACL 0.5 MG/5ML
0.1 SYRINGE (ML) INTRAVENOUS
Qty: 160 | Refills: 0 | Status: DISCONTINUED | OUTPATIENT
Start: 2025-02-05 | End: 2025-02-10

## 2025-02-05 RX ORDER — METOPROLOL SUCCINATE 50 MG/1
5 TABLET, EXTENDED RELEASE ORAL ONCE
Refills: 0 | Status: COMPLETED | OUTPATIENT
Start: 2025-02-05 | End: 2025-02-05

## 2025-02-05 RX ORDER — AMIODARONE HYDROCHLORIDE 50 MG/ML
150 INJECTION, SOLUTION INTRAVENOUS ONCE
Refills: 0 | Status: COMPLETED | OUTPATIENT
Start: 2025-02-05 | End: 2025-02-05

## 2025-02-05 RX ORDER — PHENYLEPHRINE HCL IN 0.9% NACL 0.5 MG/5ML
0.1 SYRINGE (ML) INTRAVENOUS
Qty: 40 | Refills: 0 | Status: DISCONTINUED | OUTPATIENT
Start: 2025-02-05 | End: 2025-02-05

## 2025-02-05 RX ORDER — MIDODRINE HYDROCHLORIDE 5 MG/1
10 TABLET ORAL EVERY 8 HOURS
Refills: 0 | Status: DISCONTINUED | OUTPATIENT
Start: 2025-02-05 | End: 2025-02-07

## 2025-02-05 RX ORDER — AMIODARONE HYDROCHLORIDE 50 MG/ML
200 INJECTION, SOLUTION INTRAVENOUS
Refills: 0 | Status: DISCONTINUED | OUTPATIENT
Start: 2025-02-06 | End: 2025-02-06

## 2025-02-05 RX ORDER — MAGNESIUM SULFATE 500 MG/ML
2 SYRINGE (ML) INJECTION ONCE
Refills: 0 | Status: COMPLETED | OUTPATIENT
Start: 2025-02-05 | End: 2025-02-05

## 2025-02-05 RX ORDER — LIDOCAINE HYDROCHLORIDE 20 MG/ML
1 JELLY TOPICAL
Refills: 0 | Status: DISCONTINUED | OUTPATIENT
Start: 2025-02-05 | End: 2025-02-08

## 2025-02-05 RX ADMIN — Medication 1.97 MICROGRAM(S)/KG/MIN: at 22:50

## 2025-02-05 RX ADMIN — Medication 650 MILLIGRAM(S): at 08:38

## 2025-02-05 RX ADMIN — Medication 667 MILLIGRAM(S): at 11:30

## 2025-02-05 RX ADMIN — Medication 300 MILLIGRAM(S): at 08:38

## 2025-02-05 RX ADMIN — Medication 650 MILLIGRAM(S): at 13:02

## 2025-02-05 RX ADMIN — METOPROLOL SUCCINATE 5 MILLIGRAM(S): 50 TABLET, EXTENDED RELEASE ORAL at 09:46

## 2025-02-05 RX ADMIN — SODIUM CHLORIDE 100 MILLILITER(S): 9 INJECTION, SOLUTION INTRAVENOUS at 11:08

## 2025-02-05 RX ADMIN — METOPROLOL SUCCINATE 5 MILLIGRAM(S): 50 TABLET, EXTENDED RELEASE ORAL at 09:24

## 2025-02-05 RX ADMIN — MIDODRINE HYDROCHLORIDE 10 MILLIGRAM(S): 5 TABLET ORAL at 21:00

## 2025-02-05 RX ADMIN — EZETIMIBE 10 MILLIGRAM(S): 10 TABLET ORAL at 11:17

## 2025-02-05 RX ADMIN — CEFEPIME 100 MILLIGRAM(S): 2 INJECTION, POWDER, FOR SOLUTION INTRAVENOUS at 07:52

## 2025-02-05 RX ADMIN — AMIODARONE HYDROCHLORIDE 16.7 MG/MIN: 50 INJECTION, SOLUTION INTRAVENOUS at 15:38

## 2025-02-05 RX ADMIN — AMIODARONE HYDROCHLORIDE 600 MILLIGRAM(S): 50 INJECTION, SOLUTION INTRAVENOUS at 09:24

## 2025-02-05 RX ADMIN — LIDOCAINE HYDROCHLORIDE 1 APPLICATION(S): 20 JELLY TOPICAL at 22:48

## 2025-02-05 RX ADMIN — VASOPRESSIN 6 UNIT(S)/MIN: 20 INJECTION INTRAVENOUS at 17:39

## 2025-02-05 RX ADMIN — Medication 667 MILLIGRAM(S): at 08:38

## 2025-02-05 RX ADMIN — Medication 1 APPLICATION(S): at 11:12

## 2025-02-05 RX ADMIN — Medication 100 MEQ/KG/HR: at 05:18

## 2025-02-05 RX ADMIN — Medication 650 MILLIGRAM(S): at 21:02

## 2025-02-05 RX ADMIN — Medication 25 GRAM(S): at 02:34

## 2025-02-05 RX ADMIN — ROSUVASTATIN CALCIUM 40 MILLIGRAM(S): 20 TABLET, FILM COATED ORAL at 21:00

## 2025-02-05 RX ADMIN — MIDODRINE HYDROCHLORIDE 10 MILLIGRAM(S): 5 TABLET ORAL at 08:38

## 2025-02-05 RX ADMIN — Medication 667 MILLIGRAM(S): at 17:38

## 2025-02-05 RX ADMIN — SODIUM ZIRCONIUM CYCLOSILICATE 10 GRAM(S): 5 POWDER, FOR SUSPENSION ORAL at 00:00

## 2025-02-05 RX ADMIN — Medication 40 MILLIGRAM(S): at 05:18

## 2025-02-05 RX ADMIN — MIDODRINE HYDROCHLORIDE 10 MILLIGRAM(S): 5 TABLET ORAL at 13:02

## 2025-02-05 RX ADMIN — LIDOCAINE HYDROCHLORIDE 1 APPLICATION(S): 20 JELLY TOPICAL at 23:44

## 2025-02-05 RX ADMIN — Medication 25 GRAM(S): at 00:00

## 2025-02-05 RX ADMIN — AMIODARONE HYDROCHLORIDE 33.3 MG/MIN: 50 INJECTION, SOLUTION INTRAVENOUS at 09:46

## 2025-02-05 NOTE — PROGRESS NOTE ADULT - ASSESSMENT
ALLIE TORRE is a 74y man with a medical history significant for valvular AF on warfarin and CHF, who presented initially with subacute worsening of fatigue found to have renal failure on outpatient lab testing, and is now in the critical care unit for acute renal failure.       IMPRESSION:    MARIANNE, nonoliguric  Hypotension   Metabolic acidosis likely 2/2 uremia   Chronic A fib on warfarin  HFmrEF  H/o HTN    PLAN:    CNS: Avoid depressants    HEENT: Oral care    PULMONARY: On RA. HOB @ 45 degrees.  Aspiration precautions.  CXR noted.      CARDIOVASCULAR: Goal directed fluid resuscitation. Trend trops. Obtain echo.  HF eval.  Hold anti hypertensives     GI: Feeding. Bowel regimen PRN    RENAL: Bicarb drip at 100/hour. Check UA, urine Na, urine Cr. Nephrology consult. Lokelma 10 mg Q8. Follow up lytes.  Correct as needed.  replace Mg.      INFECTIOUS DISEASE: Follow up cultures. Monitor off abx.  procal.  RVP     HEMATOLOGICAL:  DVT prophylaxis.  Dimer.  Monitor CBC     ENDOCRINE:  Follow up FS.  Insulin protocol if needed. Beta hydroxybutyrate     MUSCULOSKELETAL: bed rest.  off loading  ALLIE TORRE is a 74y man with a medical history significant for valvular AF on warfarin and CHF, who presented initially with subacute worsening of fatigue found to have renal failure on outpatient lab testing, and is now in the critical care unit for acute renal failure.       IMPRESSION:    MARIANNE, nonoliguric  AF-RVR  Hypotension   Metabolic acidosis likely 2/2 uremia   Chronic A fib on warfarin  HFmrEF  H/o HTN    PLAN:    CNS: Avoid depressants    HEENT: Oral care    PULMONARY: On RA. HOB @ 45 degrees.  Aspiration precautions.  CXR noted.      CARDIOVASCULAR: Goal directed fluid resuscitation. Trend trops. Obtain echo.  HF eval.  Hold anti hypertensives.  Change vasopressors to phenylephrine.  Consult EP for significant AF-RVR.  Target MAP >65.  Amiodarone infusion started.  Further IV metoprolol PRN.  Resume his home PO metoprolol at 100mg, increase as per EP.    GI: GI ppx not indicated. Feeding. Bowel regimen PRN    RENAL: LR drip at 100/hour. Check UA, urine Na, urine Cr. Nephrology recommendations appreciated. Lokelma 10 mg Q8. Follow up lytes.  Correct as needed.  replace Mg.  Start PO bicarb.    INFECTIOUS DISEASE: Follow up cultures. Started on Cefepime & Vanco overnight.  Follow-up cultures. procal.  RVP. MRSA nares (-), stop vancomycin.    HEMATOLOGICAL:  DVT prophylaxis.  Dimer.  Monitor CBC.  Supratherapeutic INR, monitor daily, resume warfarin once normalized.    ENDOCRINE:  Follow up FS.  Insulin protocol if needed. BHB negative.    MUSCULOSKELETAL: OOBTC    DISPO: MICU  CODE: FULL  LINES: needs CVC, a-line, kilgore

## 2025-02-05 NOTE — CONSULT NOTE ADULT - SUBJECTIVE AND OBJECTIVE BOX
Patient is a 74-year-old male with past medical history of hypertension, dyslipidemia, mitral valve regurg, A-fib on Coumadin,HFrEF with an EF of 45% in december , and status post AICD presenting for weakness. Patient endorses persistent, complaining generalized weakness, fatigue, anorexia and poor p.o. intake for the past 5-6 weeks. Patient also reports a significant weight loss. Patient states he had recent blood work which showed worsening kidney function, creatinine was 3.1 in  january up from a baseline of 1,2 in december and currently creatinine in 5.3.   Patient also hypotensive on pressors now, had rapid afib started on amio.  EP was consulted for AFIB/RVR.    REVIEW OF SYSTEMS  [x] A ten-point review of systems was otherwise negative except as noted.  [ ] Due to altered mental status/intubation, subjective information were not able to be obtained from the patient. History was obtained, to the extent possible, from review of the chart and collateral sources of information.      PAST MEDICAL & SURGICAL HISTORY:      Home Medications:  Aldactone 25 mg oral tablet: 1 tab(s) orally once a day (at bedtime) (2025 15:09)  Entresto 97 mg-103 mg oral tablet: 1 tab(s) orally 2 times a day (2025 15:09)  ezetimibe 10 mg oral tablet: 1 tab(s) orally once a day (2025 15:09)  Farxiga 10 mg oral tablet: 1 tab(s) orally once a day (2025 15:09)  metoprolol succinate 100 mg oral tablet, extended release: 1 tab(s) orally once a day (in the afternoon) (2025 15:09)  metoprolol succinate 200 mg oral capsule, extended release: 1 cap(s) orally once a day (in the morning) (2025 15:10)  NexIUM 20 mg oral delayed release capsule: 1 cap(s) orally once a day (at bedtime) (2025 15:10)  rosuvastatin 40 mg oral tablet: 1 tab(s) orally once a day (2025 15:10)  warfarin 5 mg oral tablet: 1 tab(s) orally once a day (2025 15:10)      Allergies:  No Known Allergies      FAMILY HISTORY:      SOCIAL HISTORY:    CIGARETTES:  ALCOHOL:  MARIJUANA:  ILLICIT DRUGS:        PREVIOUS DIAGNOSTIC TESTING:      ECHO FINDINGS:  Summary:   1. Left ventricular ejection fraction, by visual estimation, is 45 to   50%.   2. Technically difficult study.   3. Mildly decreased global left ventricular systolic function.   4. Normal left ventricular internal cavity size.   5. Left atrial size not well visualized.   6. Right atrial size not well visualized.   7. There appears to be a mitral annuloplasty ring.   8. Moderate tricuspid regurgitation.   9. Endocardial visualization was enhanced with intravenous echo contrast.      STRESS  FINDINGS:    CATHETERIZATION  FINDINGS:    ELECTROPHYSIOLOGY STUDY  FINDINGS:    CAROTID ULTRASOUND:  FINDINGS    VENOUS DUPLEX SCAN:  FINDINGS:    CHEST CT PULMONARY ANGIO with IV Contrast:  FINDINGS:      MEDICATIONS  (STANDING):  aMIOdarone Infusion 1 mG/Min (33.3 mL/Hr) IV Continuous <Continuous>  aMIOdarone Infusion 0.5 mG/Min (16.7 mL/Hr) IV Continuous <Continuous>  calcium acetate 667 milliGRAM(s) Oral three times a day with meals  cefepime   IVPB 2000 milliGRAM(s) IV Intermittent every 24 hours  chlorhexidine 2% Cloths 1 Application(s) Topical daily  ezetimibe 10 milliGRAM(s) Oral daily  lactated ringers. 1000 milliLiter(s) (100 mL/Hr) IV Continuous <Continuous>  midodrine 10 milliGRAM(s) Oral every 8 hours  pantoprazole    Tablet 40 milliGRAM(s) Oral before breakfast  phenylephrine    Infusion 0.1 MICROgram(s)/kG/Min (3.95 mL/Hr) IV Continuous <Continuous>  rosuvastatin 40 milliGRAM(s) Oral at bedtime  sodium bicarbonate 650 milliGRAM(s) Oral every 8 hours  vasopressin Infusion 0.04 Unit(s)/Min (6 mL/Hr) IV Continuous <Continuous>    MEDICATIONS  (PRN):      Vital Signs Last 24 Hrs  T(C): 36.9 (2025 12:00), Max: 36.9 (2025 12:00)  T(F): 98.5 (2025 12:00), Max: 98.5 (2025 12:00)  HR: 100 (2025 15:00) (81 - 186)  BP: 96/58 (2025 05:00) (66/44 - 106/50)  BP(mean): 66 (2025 05:00) (51 - 72)  RR: 21 (2025 15:00) (16 - 35)  SpO2: 97% (2025 15:00) (94% - 100%)    Parameters below as of 2025 15:00  Patient On (Oxygen Delivery Method): room air        PHYSICAL EXAM:    GENERAL: In no apparent distress, well nourished, and hydrated.  HEAD:  Atraumatic, Normocephalic  EYES: EOMI, PERRLA, conjunctiva and sclera clear  NECK: Supple and normal thyroid.  No JVD or carotid bruit.  Carotid pulse is 2+ bilaterally.  HEART: Regular rate and rhythm; No murmurs, rubs, or gallops.  PULMONARY: Clear to auscultation and perfusion.  No rales, wheezing, or rhonchi bilaterally.  ABDOMEN: Soft, Nontender, Nondistended; Bowel sounds present  EXTREMITIES:  2+ Peripheral Pulses, No clubbing, cyanosis, or edema  NEUROLOGICAL: Grossly nonfocal    I&O's Detail    2025 07:01  -  2025 07:00  --------------------------------------------------------  IN:    IV PiggyBack: 450 mL    Lactated Ringers Bolus: 1000 mL    Norepinephrine: 154.3 mL    Oral Fluid: 770 mL    Sodium Bicarbonate: 1200 mL    Sodium Chloride 0.9% Bolus: 500 mL  Total IN: 4074.3 mL    OUT:    Voided (mL): 2950 mL  Total OUT: 2950 mL    Total NET: 1124.3 mL      2025 07:01  -  2025 16:43  --------------------------------------------------------  IN:    Amiodarone: 166.5 mL    Amiodarone: 33.4 mL    IV PiggyBack: 550 mL    Lactated Ringers: 600 mL    Norepinephrine: 88.9 mL    Oral Fluid: 240 mL    Phenylephrine: 512.9 mL    Sodium Bicarbonate: 100 mL  Total IN: 2291.7 mL    OUT:    Indwelling Catheter - Urethral (mL): 1750 mL    Voided (mL): 200 mL  Total OUT: 1950 mL    Total NET: 341.7 mL        Daily     Daily Weight in k.8 (2025 06:00)    INTERPRETATION OF TELEMETRY:    ECG:        LABS:                        11.2   9.58  )-----------( 270      ( 2025 13:21 )             32.9     02-05    141  |  107  |  75[HH]  ----------------------------<  171[H]  4.3   |  18  |  2.7[H]    Ca    7.6[L]      2025 13:21  Phos  3.2     02-05  Mg     2.4     02-05    TPro  6.0  /  Alb  3.9  /  TBili  0.7  /  DBili  x   /  AST  32  /  ALT  47[H]  /  AlkPhos  54  02-05        PT/INR - ( 2025 06:20 )   PT: >40.00 sec;   INR: 7.88 ratio         PTT - ( 2025 06:20 )  PTT:69.0 sec  Urinalysis Basic - ( 2025 13:21 )    Color: x / Appearance: x / SG: x / pH: x  Gluc: 171 mg/dL / Ketone: x  / Bili: x / Urobili: x   Blood: x / Protein: x / Nitrite: x   Leuk Esterase: x / RBC: x / WBC x   Sq Epi: x / Non Sq Epi: x / Bacteria: x      BNP      Urinalysis with Rflx Culture (collected 25 @ 14:14)            RADIOLOGY & ADDITIONAL STUDIES:

## 2025-02-05 NOTE — PROGRESS NOTE ADULT - ASSESSMENT
Patient is a 74-year-old male with past medical history of hypertension, dyslipidemia, mitral valve regurg, A-fib on Coumadin, cardiomyopathy, CHF, and status post AICD presenting for weakness. Renal called for MARIANNE   When asked denied dysuria no hematuria no abdominal pain no diarrhea no focal motor sensory deficit   on Farxiga and ACE   #MARIANNE ? prerenal ? AIN / euglycemic DKA due to FArxiga use   # Acidosis due to DKA due to MARIANNE too   # hyperkalemia mild - suggest check UA and U protein creat  - Farxiga and entresto on hold   - cr trending down   - non oliguric   - no hydro on ct / stones / follow bladder  scan   - LR at 100 cc per hour   - ph noted start phoslo 1/1/1 with meals   - continue pressors / start midodrine 10 q 8   - decrease lokelma to  q24 ., can d/c in am   - check pr/ cr ratio   - h/h at goal / no LUIS EDUARDO   - start sodium bicarbonate 650 q 8   - check SPEP UPEP SFLC and IF   will follow

## 2025-02-05 NOTE — CONSULT NOTE ADULT - ASSESSMENT
Patient is a 74-year-old male with past medical history of hypertension, dyslipidemia, mitral valve regurg, A-fib on Coumadin, HFrEF with an EF of 45% in december , and status post AICD presenting for weakness. Patient endorses persistent, complaining generalized weakness, fatigue, anorexia and poor p.o. intake for the past 5-6 weeks. Patient also reports a significant weight loss. Patient states he had recent blood work which showed worsening kidney function, creatinine was 3.1 in  january up from a baseline of 1,2 in december and currently creatinine in 5.3.   Patient also hypotensive on pressors now, had rapid afib started on amio.  EP was consulted for AFIB/RVR.      - CHF s/p icd on 11/20/2018  - mitral valve regurg s/p annuloplasty  - AF with RVR  - HTN      ICD was interrogated, device functions well. Tachy therapy zones were reprogrammed, so AF with RVR would not receive inappropriate shocks.   c/w anticoagulation for thromboembolic prophylaxis ChadsVasc of at least 3  c/w Amiodarone IV load, after 24 hrs Amiodarone load transition to PO.  Baseline TFTs and LFTs  Daily EKG for QTc prolongation  Once wean off pressors, resume home dose Metoprolol and stop Amiodarone  Follow up with Dr. Reddy as outpatient  Keep electrolytes WNL  Treatment as per CCU team

## 2025-02-05 NOTE — PROCEDURE NOTE - NSPROCDETAILS_GEN_ALL_CORE
sterile/guidewire recovered/lumen(s) aspirated and flushed/sterile dressing applied/sterile technique, catheter placed/ultrasound guidance with use of sterile gel and probe cove

## 2025-02-05 NOTE — PROGRESS NOTE ADULT - SUBJECTIVE AND OBJECTIVE BOX
SUBJECTIVE/OVERNIGHT EVENTS  Today is hospital day 1d. This morning patient was seen and examined at bedside, resting comfortably in bed. Was hypotensive overnight, started on levophed.    MEDICATIONS  STANDING MEDICATIONS  aMIOdarone Infusion 1 mG/Min IV Continuous <Continuous>  aMIOdarone Infusion 0.5 mG/Min IV Continuous <Continuous>  calcium acetate 667 milliGRAM(s) Oral three times a day with meals  cefepime   IVPB 2000 milliGRAM(s) IV Intermittent every 24 hours  chlorhexidine 2% Cloths 1 Application(s) Topical daily  ezetimibe 10 milliGRAM(s) Oral daily  lactated ringers. 1000 milliLiter(s) IV Continuous <Continuous>  midodrine 10 milliGRAM(s) Oral every 8 hours  pantoprazole    Tablet 40 milliGRAM(s) Oral before breakfast  phenylephrine    Infusion 0.1 MICROgram(s)/kG/Min IV Continuous <Continuous>  rosuvastatin 40 milliGRAM(s) Oral at bedtime  sodium bicarbonate 650 milliGRAM(s) Oral every 8 hours  vasopressin Infusion 0.04 Unit(s)/Min IV Continuous <Continuous>    PRN MEDICATIONS    VITALS  T(F): 98 (02-05-25 @ 08:00), Max: 98 (02-05-25 @ 04:00)  HR: 87 (02-05-25 @ 13:00) (81 - 186)  BP: 96/58 (02-05-25 @ 05:00) (66/44 - 106/50)  RR: 20 (02-05-25 @ 13:00) (16 - 35)  SpO2: 98% (02-05-25 @ 13:00) (94% - 100%)  POCT Blood Glucose.: 105 mg/dL (02-04-25 @ 15:49)    PHYSICAL EXAM  CCO*3  GBAE, clear lungs  Irregular S1S2, no murmurs  Soft abdomen, non tender  No LLE      LABS             11.2   9.58  )-----------( 270      ( 02-05-25 @ 13:21 )             32.9     141  |  107  |  75  -------------------------<  171   02-05-25 @ 13:21  4.3  |  18  |  2.7    Ca      7.6     02-05-25 @ 13:21  Phos   3.2     02-05-25 @ 13:21  Mg     2.4     02-05-25 @ 13:21    TPro  6.0  /  Alb  3.9  /  TBili  0.7  /  DBili  x   /  AST  32  /  ALT  47  /  AlkPhos  54  /  GGT  x     02-05-25 @ 13:21    PT/INR - ( 02-05-25 @ 06:20 )   PT: >40.00 sec[H];   INR: 7.88 ratio[HH]  PTT - ( 02-05-25 @ 06:20 )  PTT:69.0 sec    Troponin T, High Sensitivity Result: 33 ng/L (02-05-25 @ 00:40)  Troponin T, High Sensitivity Result: 33 ng/L (02-04-25 @ 23:35)  Troponin T, High Sensitivity Result: 34 ng/L (02-04-25 @ 20:00)    Urinalysis Basic - ( 05 Feb 2025 13:21 )    Color: x / Appearance: x / SG: x / pH: x  Gluc: 171 mg/dL / Ketone: x  / Bili: x / Urobili: x   Blood: x / Protein: x / Nitrite: x   Leuk Esterase: x / RBC: x / WBC x   Sq Epi: x / Non Sq Epi: x / Bacteria: x      ABG - ( 05 Feb 2025 03:43 )  pH, Arterial: 7.36  pH, Blood: x     /  pCO2: 31    /  pO2: 79    / HCO3: 18    / Base Excess: -6.8  /  SaO2: 97.6                Urinalysis with Rflx Culture (collected 04 Feb 2025 14:14)      IMAGING

## 2025-02-05 NOTE — PROGRESS NOTE ADULT - SUBJECTIVE AND OBJECTIVE BOX
seen and examined  24 h events noted   no distress       PAST HISTORY  --------------------------------------------------------------------------------  No significant changes to PMH, PSH, FHx, SHx, unless otherwise noted    ALLERGIES & MEDICATIONS  --------------------------------------------------------------------------------  Allergies    No Known Allergies    Intolerances      Standing Inpatient Medications  cefepime   IVPB 2000 milliGRAM(s) IV Intermittent every 24 hours  chlorhexidine 2% Cloths 1 Application(s) Topical daily  ezetimibe 10 milliGRAM(s) Oral daily  norepinephrine Infusion 0.05 MICROgram(s)/kG/Min IV Continuous <Continuous>  pantoprazole    Tablet 40 milliGRAM(s) Oral before breakfast  rosuvastatin 40 milliGRAM(s) Oral at bedtime  sodium bicarbonate  Infusion 0.141 mEq/kG/Hr IV Continuous <Continuous>  sodium zirconium cyclosilicate 10 Gram(s) Oral every 8 hours  vancomycin  IVPB 1500 milliGRAM(s) IV Intermittent once        VITALS/PHYSICAL EXAM  --------------------------------------------------------------------------------  T(C): 36.7 (02-05-25 @ 08:00), Max: 36.9 (02-04-25 @ 10:37)  HR: 94 (02-05-25 @ 07:00) (81 - 99)  BP: 96/58 (02-05-25 @ 05:00) (65/45 - 106/50)  RR: 17 (02-05-25 @ 07:00) (16 - 35)  SpO2: 100% (02-05-25 @ 07:00) (94% - 100%)  Wt(kg): --  Height (cm): 190.5 (02-04-25 @ 16:30)  Weight (kg): 105.2 (02-04-25 @ 16:30)  BMI (kg/m2): 29 (02-04-25 @ 16:30)  BSA (m2): 2.34 (02-04-25 @ 16:30)      02-04-25 @ 07:01  -  02-05-25 @ 07:00  --------------------------------------------------------  IN: 4074.3 mL / OUT: 2950 mL / NET: 1124.3 mL    02-05-25 @ 07:01  -  02-05-25 @ 07:50  --------------------------------------------------------  IN: 139.5 mL / OUT: 200 mL / NET: -60.5 mL      Physical Exam:  	Gen: NAD  	Pulm: decrease BS B/L  	CV:  S1S2; no rub  	Abd:  soft, nontender/nondistended  	LE: no edema      LABS/STUDIES  --------------------------------------------------------------------------------              11.3   7.48  >-----------<  265      [02-05-25 @ 06:20]              33.2     141  |  106  |  81  ----------------------------<  150      [02-05-25 @ 06:20]  4.4   |  16  |  3.2        Ca     7.8     [02-05-25 @ 06:20]      Mg     2.6     [02-05-25 @ 06:20]      Phos  7.9     [02-04-25 @ 10:50]    TPro  6.4  /  Alb  4.0  /  TBili  0.8  /  DBili  x   /  AST  31  /  ALT  48  /  AlkPhos  59  [02-05-25 @ 06:20]    PT/INR: PT >40.00, INR 7.88       [02-05-25 @ 06:20]  PTT: 69.0       [02-05-25 @ 06:20]      Creatinine Trend:  SCr 3.2 [02-05 @ 06:20]  SCr 3.8 [02-04 @ 23:35]  SCr 4.1 [02-04 @ 20:00]  SCr 5.3 [02-04 @ 10:50]    Urinalysis - [02-05-25 @ 06:20]      Color  / Appearance  / SG  / pH       Gluc 150 / Ketone   / Bili  / Urobili        Blood  / Protein  / Leuk Est  / Nitrite       RBC  / WBC  / Hyaline  / Gran  / Sq Epi  / Non Sq Epi  / Bacteria

## 2025-02-05 NOTE — PROGRESS NOTE ADULT - SUBJECTIVE AND OBJECTIVE BOX
Patient is a 74y old  Male who presents with a chief complaint of       Over Night Events:    No acute events  Remains on relatively high-dose levophed at 0.3    ROS:     All ROS are negative except HPI         PHYSICAL EXAM    ICU Vital Signs Last 24 Hrs  T(C): 36.7 (05 Feb 2025 08:00), Max: 36.9 (04 Feb 2025 10:37)  T(F): 98 (05 Feb 2025 08:00), Max: 98.4 (04 Feb 2025 10:37)  HR: 94 (05 Feb 2025 07:00) (81 - 99)  BP: 96/58 (05 Feb 2025 05:00) (65/45 - 106/50)  BP(mean): 66 (05 Feb 2025 05:00) (51 - 72)  ABP: 91/49 (05 Feb 2025 07:00) (81/46 - 91/49)  ABP(mean): 63 (05 Feb 2025 07:00) (58 - 63)  RR: 17 (05 Feb 2025 07:00) (16 - 35)  SpO2: 100% (05 Feb 2025 07:00) (94% - 100%)    O2 Parameters below as of 05 Feb 2025 08:00  Patient On (Oxygen Delivery Method): room air            Constitutional: no acute distress, well nourished well developed  Neuro: moving all 4 limbs spontaneously, no facial droop or dysarthria  HEENT: NCAT, anicteric  Neck: no visible lymphadenopathy or goiter  Pulm: no respiratory distress. clear to auscultation bilaterally  Cardiac: extremities appear pink and well-perfused.  regular rhythm and rate, no murmur detected  Abdomen: non-distended  Extremities: no peripheral edema      02-04-25 @ 07:01  -  02-05-25 @ 07:00  --------------------------------------------------------  IN:    IV PiggyBack: 450 mL    Lactated Ringers Bolus: 1000 mL    Norepinephrine: 154.3 mL    Oral Fluid: 770 mL    Sodium Bicarbonate: 1200 mL    Sodium Chloride 0.9% Bolus: 500 mL  Total IN: 4074.3 mL    OUT:    Voided (mL): 2950 mL  Total OUT: 2950 mL    Total NET: 1124.3 mL      02-05-25 @ 07:01  -  02-05-25 @ 07:59  --------------------------------------------------------  IN:    Norepinephrine: 39.5 mL    Sodium Bicarbonate: 100 mL  Total IN: 139.5 mL    OUT:    Voided (mL): 200 mL  Total OUT: 200 mL    Total NET: -60.5 mL          LABS:                            11.3   7.48  )-----------( 265      ( 05 Feb 2025 06:20 )             33.2                                               02-05    141  |  106  |  81[HH]  ----------------------------<  150[H]  4.4   |  16[L]  |  3.2[H]    Ca    7.8[L]      05 Feb 2025 06:20  Phos  7.9     02-04  Mg     2.6     02-05    TPro  6.4  /  Alb  4.0  /  TBili  0.8  /  DBili  x   /  AST  31  /  ALT  48[H]  /  AlkPhos  59  02-05      PT/INR - ( 05 Feb 2025 06:20 )   PT: >40.00 sec;   INR: 7.88 ratio         PTT - ( 05 Feb 2025 06:20 )  PTT:69.0 sec                                       Urinalysis Basic - ( 05 Feb 2025 06:20 )    Color: x / Appearance: x / SG: x / pH: x  Gluc: 150 mg/dL / Ketone: x  / Bili: x / Urobili: x   Blood: x / Protein: x / Nitrite: x   Leuk Esterase: x / RBC: x / WBC x   Sq Epi: x / Non Sq Epi: x / Bacteria: x                                                  LIVER FUNCTIONS - ( 05 Feb 2025 06:20 )  Alb: 4.0 g/dL / Pro: 6.4 g/dL / ALK PHOS: 59 U/L / ALT: 48 U/L / AST: 31 U/L / GGT: x                                                  Urinalysis with Rflx Culture (collected 04 Feb 2025 14:14)                                                                                       ABG - ( 05 Feb 2025 03:43 )  pH, Arterial: 7.36  pH, Blood: x     /  pCO2: 31    /  pO2: 79    / HCO3: 18    / Base Excess: -6.8  /  SaO2: 97.6                MEDICATIONS  (STANDING):  cefepime   IVPB 2000 milliGRAM(s) IV Intermittent every 24 hours  chlorhexidine 2% Cloths 1 Application(s) Topical daily  ezetimibe 10 milliGRAM(s) Oral daily  norepinephrine Infusion 0.05 MICROgram(s)/kG/Min (9.86 mL/Hr) IV Continuous <Continuous>  pantoprazole    Tablet 40 milliGRAM(s) Oral before breakfast  rosuvastatin 40 milliGRAM(s) Oral at bedtime  sodium bicarbonate  Infusion 0.141 mEq/kG/Hr (100 mL/Hr) IV Continuous <Continuous>  sodium zirconium cyclosilicate 10 Gram(s) Oral every 8 hours  vancomycin  IVPB 1500 milliGRAM(s) IV Intermittent once  vasopressin Infusion 0.04 Unit(s)/Min (6 mL/Hr) IV Continuous <Continuous>    MEDICATIONS  (PRN):      New X-rays reviewed:       ECHO reviewed    CXR interpreted by me:    2/5  images and radiologist read reviewed, by my read demonstrating no acute intrathoracic pathology, stable cardiomegaly with AICD in place.   Cryotherapy Text: The wound bed was treated with cryotherapy after the biopsy was performed.

## 2025-02-05 NOTE — PROGRESS NOTE ADULT - ASSESSMENT
Patient is a 74-year-old male with past medical history of hypertension, dyslipidemia, mitral valve regurg, A-fib on Coumadin,HFrEF with an EF of 45% in december , and status post AICD presenting for weakness. Patient endorses persistent, complaining generalized weakness, fatigue, anorexia and poor p.o. intake for the past 5-6 weeks. Patient also reports a significant weight loss. Patient  states he had recent blood work which showed worsening kidney function, creatinine was 3.1 in  january up from a baseline of 1,2 in december and currently creatinine in 5.3.   Patient also hypotensive on pressors now, had rapid afib started on amio.    #Shock, unclear cause  -Pt was hypotensive overnight, no fever, no leukocytosis  -UA negative  -CXR normal  -CTAP negative  -Received fluids, not responsive, started on levophed  -After having rapid rate Afib on 2/5, switched to phenylephrine and vasopressin was added  -Cx taken, started on cefepime and vanco  -F/u vanco level to re-dose  -F/u MRSA to decide on vanco  -F/u urine and blood cx  -F/u RVP    #Afib, was in RVR  -Amio loaded and started on amio drip today 2/5  -Received 2 doses of metoprolol 5mg IV each  -Now rate controlled  -On metoprolol 200mg at home, holding now in the setting of hypotension  -On coumadin at home (since it's a valvular Afib), holding now as INR supra-therapeutic (10.4 on admission, today 7)  -Would start heparin drip once INR between 2 and 3    #MARIANNE  #HAGMA  -Crea trended up as per patient from 1.2 baseline, to 3.1 in Jan, and now 5.3  -Holding home aldactone, ACEi, entresto and farxiga  -Not euglycemic DKA as BHB negative  -On  cc/h as per nephro  -PO HCO3 started  -Midodrine 10mg q8  -KBUS negative  -MM w/u follow up    #HFimpEF  -TTE showed EF 45-50%, mod TR, s/p mitral annuloplasty  -Holding all his GDMT now as pt in shock, would resume progressively when out of shock    #Diet: DASH  #DVT pro: For now off as INR elevated  #GI pro: pantoprazole  #Activity: as tolerated  #Dispo: ICU  #Code status: Full code as per patient   Patient is a 74-year-old male with past medical history of hypertension, dyslipidemia, mitral valve regurg, A-fib on Coumadin,HFrEF with an EF of 45% in december , and status post AICD presenting for weakness. Patient endorses persistent, complaining generalized weakness, fatigue, anorexia and poor p.o. intake for the past 5-6 weeks. Patient also reports a significant weight loss. Patient  states he had recent blood work which showed worsening kidney function, creatinine was 3.1 in  january up from a baseline of 1,2 in december and currently creatinine in 5.3.   Patient also hypotensive on pressors now, had rapid afib started on amio.    #Shock, unclear source  -Pt was hypotensive overnight, no fever, no leukocytosis  -UA negative  -CXR normal  -CTAP negative  -Received fluids, not responsive, started on levophed  -After having rapid rate Afib on 2/5, switched to phenylephrine and vasopressin was added  -Cx taken, started on cefepime and vanco  -F/u vanco level to re-dose  -F/u MRSA to decide on vanco  -F/u urine and blood cx  -F/u RVP    #Afib, was in RVR  -Amio loaded and started on amio drip today 2/5  -Received 2 doses of metoprolol 5mg IV each  -Now rate controlled  -On metoprolol 200mg at home, holding now in the setting of hypotension  -EP consulted  -On coumadin at home (since it's a valvular Afib), holding now as INR supra-therapeutic (10.4 on admission, today 7)  -Would start heparin drip once INR between 2 and 3    #MARIANNE  #HAGMA  -Crea trended up as per patient from 1.2 baseline, to 3.1 in Jan, and now 5.3  -Holding home aldactone, ACEi, entresto and farxiga  -Not euglycemic DKA as BHB negative  -Art inserted for accurate I/O  -On  cc/h as per nephro  -PO HCO3 started  -Midodrine 10mg q8  -KBUS negative  -MM w/u follow up    #HFimpEF  -TTE showed EF 45-50%, mod TR, s/p mitral annuloplasty  -Holding all his GDMT now as pt in shock, would resume progressively when out of shock    #Diet: DASH  #DVT pro: For now off as INR elevated  #GI pro: pantoprazole  #Activity: as tolerated  #Dispo: ICU  #Code status: Full code as per patient

## 2025-02-06 ENCOUNTER — APPOINTMENT (OUTPATIENT)
Dept: MEDICATION MANAGEMENT | Facility: CLINIC | Age: 75
End: 2025-02-06

## 2025-02-06 LAB
ALBUMIN SERPL ELPH-MCNC: 3.8 G/DL — SIGNIFICANT CHANGE UP (ref 3.5–5.2)
ALP SERPL-CCNC: 48 U/L — SIGNIFICANT CHANGE UP (ref 30–115)
ALT FLD-CCNC: 44 U/L — HIGH (ref 0–41)
ANION GAP SERPL CALC-SCNC: 15 MMOL/L — HIGH (ref 7–14)
APTT BLD: 47.6 SEC — HIGH (ref 27–39.2)
AST SERPL-CCNC: 31 U/L — SIGNIFICANT CHANGE UP (ref 0–41)
BASOPHILS # BLD AUTO: 0.03 K/UL — SIGNIFICANT CHANGE UP (ref 0–0.2)
BASOPHILS NFR BLD AUTO: 0.4 % — SIGNIFICANT CHANGE UP (ref 0–1)
BILIRUB SERPL-MCNC: 0.7 MG/DL — SIGNIFICANT CHANGE UP (ref 0.2–1.2)
BUN SERPL-MCNC: 57 MG/DL — HIGH (ref 10–20)
CALCIUM SERPL-MCNC: 7.6 MG/DL — LOW (ref 8.4–10.5)
CHLORIDE SERPL-SCNC: 106 MMOL/L — SIGNIFICANT CHANGE UP (ref 98–110)
CO2 SERPL-SCNC: 20 MMOL/L — SIGNIFICANT CHANGE UP (ref 17–32)
CORTIS AM PEAK SERPL-MCNC: 4.8 UG/DL — LOW (ref 6–18.4)
CREAT SERPL-MCNC: 1.9 MG/DL — HIGH (ref 0.7–1.5)
EGFR: 37 ML/MIN/1.73M2 — LOW
EOSINOPHIL # BLD AUTO: 0.06 K/UL — SIGNIFICANT CHANGE UP (ref 0–0.7)
EOSINOPHIL NFR BLD AUTO: 0.7 % — SIGNIFICANT CHANGE UP (ref 0–8)
FLUAV AG NPH QL: SIGNIFICANT CHANGE UP
FLUBV AG NPH QL: SIGNIFICANT CHANGE UP
GAS PNL BLDA: SIGNIFICANT CHANGE UP
GLUCOSE SERPL-MCNC: 196 MG/DL — HIGH (ref 70–99)
HCT VFR BLD CALC: 31.5 % — LOW (ref 42–52)
HGB BLD-MCNC: 10.6 G/DL — LOW (ref 14–18)
IMM GRANULOCYTES NFR BLD AUTO: 0.4 % — HIGH (ref 0.1–0.3)
INR BLD: 4 RATIO — HIGH (ref 0.65–1.3)
KAPPA LC SER QL IFE: 4.4 MG/DL — HIGH (ref 0.33–1.94)
KAPPA/LAMBDA FREE LIGHT CHAIN RATIO, SERUM: 1.12 RATIO — SIGNIFICANT CHANGE UP (ref 0.26–1.65)
LAMBDA LC SER QL IFE: 3.92 MG/DL — HIGH (ref 0.57–2.63)
LYMPHOCYTES # BLD AUTO: 0.78 K/UL — LOW (ref 1.2–3.4)
LYMPHOCYTES # BLD AUTO: 9.3 % — LOW (ref 20.5–51.1)
MAGNESIUM SERPL-MCNC: 1.5 MG/DL — LOW (ref 1.8–2.4)
MCHC RBC-ENTMCNC: 30.5 PG — SIGNIFICANT CHANGE UP (ref 27–31)
MCHC RBC-ENTMCNC: 33.7 G/DL — SIGNIFICANT CHANGE UP (ref 32–37)
MCV RBC AUTO: 90.5 FL — SIGNIFICANT CHANGE UP (ref 80–94)
MONOCYTES # BLD AUTO: 0.49 K/UL — SIGNIFICANT CHANGE UP (ref 0.1–0.6)
MONOCYTES NFR BLD AUTO: 5.9 % — SIGNIFICANT CHANGE UP (ref 1.7–9.3)
NEUTROPHILS # BLD AUTO: 6.96 K/UL — HIGH (ref 1.4–6.5)
NEUTROPHILS NFR BLD AUTO: 83.3 % — HIGH (ref 42.2–75.2)
NRBC # BLD: 0 /100 WBCS — SIGNIFICANT CHANGE UP (ref 0–0)
NRBC BLD-RTO: 0 /100 WBCS — SIGNIFICANT CHANGE UP (ref 0–0)
PHOSPHATE SERPL-MCNC: 3.2 MG/DL — SIGNIFICANT CHANGE UP (ref 2.1–4.9)
PLATELET # BLD AUTO: 240 K/UL — SIGNIFICANT CHANGE UP (ref 130–400)
PMV BLD: 11.5 FL — HIGH (ref 7.4–10.4)
POTASSIUM SERPL-MCNC: 4.4 MMOL/L — SIGNIFICANT CHANGE UP (ref 3.5–5)
POTASSIUM SERPL-SCNC: 4.4 MMOL/L — SIGNIFICANT CHANGE UP (ref 3.5–5)
PROCALCITONIN SERPL-MCNC: 0.23 NG/ML — HIGH (ref 0.02–0.1)
PROT SERPL-MCNC: 5.8 G/DL — LOW (ref 6–8)
PROT SERPL-MCNC: 6.4 G/DL — SIGNIFICANT CHANGE UP (ref 6–8.3)
PROTHROM AB SERPL-ACNC: >40 SEC — HIGH (ref 9.95–12.87)
RBC # BLD: 3.48 M/UL — LOW (ref 4.7–6.1)
RBC # FLD: 12.8 % — SIGNIFICANT CHANGE UP (ref 11.5–14.5)
RSV RNA NPH QL NAA+NON-PROBE: SIGNIFICANT CHANGE UP
SARS-COV-2 RNA SPEC QL NAA+PROBE: SIGNIFICANT CHANGE UP
SODIUM SERPL-SCNC: 141 MMOL/L — SIGNIFICANT CHANGE UP (ref 135–146)
T4 FREE SERPL-MCNC: 1.5 NG/DL — SIGNIFICANT CHANGE UP (ref 0.9–1.7)
TSH SERPL-MCNC: 0.22 UIU/ML — LOW (ref 0.27–4.2)
VANCOMYCIN FLD-MCNC: 7.5 UG/ML — SIGNIFICANT CHANGE UP (ref 5–10)
WBC # BLD: 8.35 K/UL — SIGNIFICANT CHANGE UP (ref 4.8–10.8)
WBC # FLD AUTO: 8.35 K/UL — SIGNIFICANT CHANGE UP (ref 4.8–10.8)

## 2025-02-06 PROCEDURE — 99291 CRITICAL CARE FIRST HOUR: CPT

## 2025-02-06 RX ORDER — SODIUM BICARBONATE 1 MEQ/ML
0.14 SYRINGE (ML) INTRAVENOUS
Qty: 150 | Refills: 0 | Status: DISCONTINUED | OUTPATIENT
Start: 2025-02-06 | End: 2025-02-06

## 2025-02-06 RX ORDER — VANCOMYCIN HCL IN 5 % DEXTROSE 1.5G/250ML
1000 PLASTIC BAG, INJECTION (ML) INTRAVENOUS ONCE
Refills: 0 | Status: COMPLETED | OUTPATIENT
Start: 2025-02-06 | End: 2025-02-06

## 2025-02-06 RX ORDER — MEROPENEM 1 G/30ML
1000 INJECTION INTRAVENOUS ONCE
Refills: 0 | Status: COMPLETED | OUTPATIENT
Start: 2025-02-06 | End: 2025-02-06

## 2025-02-06 RX ORDER — AMIODARONE HYDROCHLORIDE 50 MG/ML
0.5 INJECTION, SOLUTION INTRAVENOUS
Qty: 450 | Refills: 0 | Status: DISCONTINUED | OUTPATIENT
Start: 2025-02-06 | End: 2025-02-07

## 2025-02-06 RX ORDER — SODIUM CHLORIDE 9 G/1000ML
1000 INJECTION, SOLUTION INTRAVENOUS
Refills: 0 | Status: DISCONTINUED | OUTPATIENT
Start: 2025-02-06 | End: 2025-02-07

## 2025-02-06 RX ORDER — SODIUM CHLORIDE 9 G/1000ML
1000 INJECTION, SOLUTION INTRAVENOUS ONCE
Refills: 0 | Status: COMPLETED | OUTPATIENT
Start: 2025-02-06 | End: 2025-02-06

## 2025-02-06 RX ORDER — MAGNESIUM SULFATE 500 MG/ML
2 SYRINGE (ML) INJECTION
Refills: 0 | Status: COMPLETED | OUTPATIENT
Start: 2025-02-06 | End: 2025-02-06

## 2025-02-06 RX ORDER — CEFTRIAXONE 500 MG/1
2000 INJECTION, POWDER, FOR SOLUTION INTRAMUSCULAR; INTRAVENOUS EVERY 24 HOURS
Refills: 0 | Status: DISCONTINUED | OUTPATIENT
Start: 2025-02-06 | End: 2025-02-07

## 2025-02-06 RX ORDER — DEXAMETHASONE 0.5 MG/1
6 TABLET ORAL DAILY
Refills: 0 | Status: DISCONTINUED | OUTPATIENT
Start: 2025-02-06 | End: 2025-02-06

## 2025-02-06 RX ORDER — MEROPENEM 1 G/30ML
INJECTION INTRAVENOUS
Refills: 0 | Status: DISCONTINUED | OUTPATIENT
Start: 2025-02-06 | End: 2025-02-06

## 2025-02-06 RX ORDER — SODIUM CHLORIDE 9 G/1000ML
500 INJECTION, SOLUTION INTRAVENOUS ONCE
Refills: 0 | Status: COMPLETED | OUTPATIENT
Start: 2025-02-06 | End: 2025-02-06

## 2025-02-06 RX ORDER — DEXAMETHASONE 0.5 MG/1
6 TABLET ORAL EVERY 24 HOURS
Refills: 0 | Status: DISCONTINUED | OUTPATIENT
Start: 2025-02-06 | End: 2025-02-09

## 2025-02-06 RX ORDER — MEROPENEM 1 G/30ML
1000 INJECTION INTRAVENOUS EVERY 8 HOURS
Refills: 0 | Status: DISCONTINUED | OUTPATIENT
Start: 2025-02-06 | End: 2025-02-06

## 2025-02-06 RX ORDER — AMIODARONE HYDROCHLORIDE 50 MG/ML
200 INJECTION, SOLUTION INTRAVENOUS
Refills: 0 | Status: DISCONTINUED | OUTPATIENT
Start: 2025-02-06 | End: 2025-02-10

## 2025-02-06 RX ADMIN — AMIODARONE HYDROCHLORIDE 200 MILLIGRAM(S): 50 INJECTION, SOLUTION INTRAVENOUS at 13:15

## 2025-02-06 RX ADMIN — Medication 100 MEQ/KG/HR: at 01:36

## 2025-02-06 RX ADMIN — Medication 650 MILLIGRAM(S): at 05:06

## 2025-02-06 RX ADMIN — AMIODARONE HYDROCHLORIDE 16.7 MG/MIN: 50 INJECTION, SOLUTION INTRAVENOUS at 06:39

## 2025-02-06 RX ADMIN — Medication 650 MILLIGRAM(S): at 22:11

## 2025-02-06 RX ADMIN — Medication 667 MILLIGRAM(S): at 11:43

## 2025-02-06 RX ADMIN — Medication 667 MILLIGRAM(S): at 08:17

## 2025-02-06 RX ADMIN — Medication 667 MILLIGRAM(S): at 17:09

## 2025-02-06 RX ADMIN — Medication 1 APPLICATION(S): at 11:44

## 2025-02-06 RX ADMIN — SODIUM CHLORIDE 500 MILLILITER(S): 9 INJECTION, SOLUTION INTRAVENOUS at 16:08

## 2025-02-06 RX ADMIN — SODIUM CHLORIDE 1000 MILLILITER(S): 9 INJECTION, SOLUTION INTRAVENOUS at 09:35

## 2025-02-06 RX ADMIN — AMIODARONE HYDROCHLORIDE 200 MILLIGRAM(S): 50 INJECTION, SOLUTION INTRAVENOUS at 17:09

## 2025-02-06 RX ADMIN — LIDOCAINE HYDROCHLORIDE 1 APPLICATION(S): 20 JELLY TOPICAL at 11:43

## 2025-02-06 RX ADMIN — Medication 40 MILLIGRAM(S): at 05:06

## 2025-02-06 RX ADMIN — VASOPRESSIN 6 UNIT(S)/MIN: 20 INJECTION INTRAVENOUS at 06:04

## 2025-02-06 RX ADMIN — MIDODRINE HYDROCHLORIDE 10 MILLIGRAM(S): 5 TABLET ORAL at 05:06

## 2025-02-06 RX ADMIN — DEXAMETHASONE 6 MILLIGRAM(S): 0.5 TABLET ORAL at 01:42

## 2025-02-06 RX ADMIN — Medication 650 MILLIGRAM(S): at 13:15

## 2025-02-06 RX ADMIN — SODIUM CHLORIDE 100 MILLILITER(S): 9 INJECTION, SOLUTION INTRAVENOUS at 11:44

## 2025-02-06 RX ADMIN — Medication 25 GRAM(S): at 06:05

## 2025-02-06 RX ADMIN — LIDOCAINE HYDROCHLORIDE 1 APPLICATION(S): 20 JELLY TOPICAL at 17:09

## 2025-02-06 RX ADMIN — LIDOCAINE HYDROCHLORIDE 1 APPLICATION(S): 20 JELLY TOPICAL at 05:07

## 2025-02-06 RX ADMIN — MEROPENEM 100 MILLIGRAM(S): 1 INJECTION INTRAVENOUS at 05:32

## 2025-02-06 RX ADMIN — MEROPENEM 100 MILLIGRAM(S): 1 INJECTION INTRAVENOUS at 01:18

## 2025-02-06 RX ADMIN — MIDODRINE HYDROCHLORIDE 10 MILLIGRAM(S): 5 TABLET ORAL at 22:11

## 2025-02-06 RX ADMIN — EZETIMIBE 10 MILLIGRAM(S): 10 TABLET ORAL at 11:43

## 2025-02-06 RX ADMIN — CEFTRIAXONE 100 MILLIGRAM(S): 500 INJECTION, POWDER, FOR SOLUTION INTRAMUSCULAR; INTRAVENOUS at 13:15

## 2025-02-06 RX ADMIN — ROSUVASTATIN CALCIUM 40 MILLIGRAM(S): 20 TABLET, FILM COATED ORAL at 22:11

## 2025-02-06 RX ADMIN — Medication 25 GRAM(S): at 06:39

## 2025-02-06 RX ADMIN — Medication 250 MILLIGRAM(S): at 13:49

## 2025-02-06 RX ADMIN — MIDODRINE HYDROCHLORIDE 10 MILLIGRAM(S): 5 TABLET ORAL at 13:15

## 2025-02-06 NOTE — DIETITIAN INITIAL EVALUATION ADULT - PERTINENT LABORATORY DATA
02-06    141  |  106  |  57[H]  ----------------------------<  196[H]  4.4   |  20  |  1.9[H]    Ca    7.6[L]      06 Feb 2025 04:50  Phos  3.2     02-06  Mg     1.5     02-06    TPro  5.8[L]  /  Alb  3.8  /  TBili  0.7  /  DBili  x   /  AST  31  /  ALT  44[H]  /  AlkPhos  48  02-06

## 2025-02-06 NOTE — DIETITIAN INITIAL EVALUATION ADULT - OTHER CALCULATIONS
Energy: 2104 - 2630 kcal/day (20-25 kcal/kg)  Protein: 105 - 126 gm/day (1-1.2 gm/kg)  Fluid: 1 mL/kcal  estimated needs with consideration for age, BMI, acuity of illness

## 2025-02-06 NOTE — PHARMACOTHERAPY INTERVENTION NOTE - COMMENTS
Patient on vancomycin dosed by level in the setting of MARIANNE as empiric treatment pending blood culture results. Patient is to receive vancomycin 1000mg IV x 1 STAT today based on a vancomycin level of 7.5 mg/L today at 12:35. Recommended obtaining a repeat vancomycin level on 2/7 with AM labs to assist with further dosing.

## 2025-02-06 NOTE — PROGRESS NOTE ADULT - ASSESSMENT
ALLIE TORRE is a 74y man with a medical history significant for valvular AF on warfarin and CHF, who presented initially with subacute worsening of fatigue found to have renal failure on outpatient lab testing, and is now in the critical care unit for acute renal failure.       IMPRESSION:    MARIANNE, nonoliguric  AF-RVR  Hypotension   Metabolic acidosis likely 2/2 uremia   Chronic A fib on warfarin  HFmrEF  H/o HTN    PLAN:    CNS: Avoid depressants    HEENT: Oral care    PULMONARY: On RA. HOB @ 45 degrees.  Aspiration precautions.  CXR noted.      CARDIOVASCULAR:   Vasopressors: Phenylephrine & Vasopressin  Started midodrine 10mg q8  Trial bolus 1L LR, repeat if improvement in BP  TTE this admission without heart failure  Target MAP >65.    Consult EP for significant AF-RVR.  Recommendations appreciated  Amiodarone infusion continues today, transition to PO once load completed.    Start metoprolol once off vasopressors.    GI: GI ppx not indicated. Feeding. Bowel regimen PRN    RENAL: LR drip at 100/hour. Check UA, urine Na, urine Cr. Nephrology recommendations appreciated.  Follow up lytes.  Correct as needed.  replace Mg.  Start PO bicarb.    INFECTIOUS DISEASE: Follow up cultures. Started on Meropenem overnight.  MRSA nares (-), vancomycin stopped.  Follow-up cultures. procal.  RVP.  ID consulted, appreciate recommendations    HEMATOLOGICAL:  DVT prophylaxis.  Dimer.  Monitor CBC.  Supratherapeutic INR, monitor daily, resume warfarin soon, will have pharmacy evaluate.    ENDOCRINE:  Follow up FS.  Insulin protocol if needed. BHB negative.  Started dexamethasone, may have worsening BG.  Follow-up AM cortisol.    MUSCULOSKELETAL: OOBTC    DISPO: MICU  CODE: FULL  LINES: needs CVC, a-line, kilgore

## 2025-02-06 NOTE — PROGRESS NOTE ADULT - SUBJECTIVE AND OBJECTIVE BOX
Patient is a 74y old  Male who presents with a chief complaint of       Over Night Events:    Worsening vasopressor requirements, patient remains asymptomatic  Patient went into rapid AF yesterday, started on amio  EP evaluation yesterday recommended continuing amiodarone  Started on dexamethasone this AM for ?adrenal insufficiency      ROS:     All ROS are negative except HPI       PHYSICAL EXAM    ICU Vital Signs Last 24 Hrs  T(C): 36.9 (06 Feb 2025 08:00), Max: 37.2 (05 Feb 2025 16:00)  T(F): 98.5 (06 Feb 2025 08:00), Max: 99 (05 Feb 2025 16:00)  HR: 82 (06 Feb 2025 09:00) (76 - 100)  BP: --  BP(mean): --  ABP: 116/61 (06 Feb 2025 09:00) (70/42 - 122/65)  ABP(mean): 82 (06 Feb 2025 09:00) (54 - 88)  RR: 25 (06 Feb 2025 09:00) (16 - 37)  SpO2: 95% (06 Feb 2025 09:00) (90% - 98%)    O2 Parameters below as of 06 Feb 2025 09:00  Patient On (Oxygen Delivery Method): room air            Constitutional: no acute distress, well nourished well developed  Neuro: moving all 4 limbs spontaneously, no facial droop or dysarthria  HEENT: NCAT, anicteric  Neck: no visible lymphadenopathy or goiter  Pulm: no respiratory distress. clear to auscultation bilaterally  Cardiac: extremities appear pink and well-perfused.  regular rhythm and rate, no murmur detected  Abdomen: non-distended  Extremities: no peripheral edema      02-05-25 @ 07:01  -  02-06-25 @ 07:00  --------------------------------------------------------  IN:    Amiodarone: 166.5 mL    Amiodarone: 200.4 mL    Amiodarone: 83.5 mL    IV PiggyBack: 550 mL    Lactated Ringers: 1500 mL    Norepinephrine: 88.9 mL    Oral Fluid: 410 mL    Phenylephrine: 828.5 mL    Phenylephrine: 536.3 mL    Sodium Bicarbonate: 100 mL    Sodium Bicarbonate: 600 mL    Vasopressin: 90 mL  Total IN: 5154.1 mL    OUT:    Indwelling Catheter - Urethral (mL): 4369 mL    Voided (mL): 200 mL  Total OUT: 4569 mL    Total NET: 585.1 mL      02-06-25 @ 07:01  -  02-06-25 @ 09:14  --------------------------------------------------------  IN:    Amiodarone: 16.7 mL    Phenylephrine: 39.5 mL    Sodium Bicarbonate: 100 mL    Vasopressin: 6 mL  Total IN: 162.2 mL    OUT:    Indwelling Catheter - Urethral (mL): 100 mL  Total OUT: 100 mL    Total NET: 62.2 mL          LABS:                            10.6   8.35  )-----------( 240      ( 06 Feb 2025 04:50 )             31.5                                               02-06    141  |  106  |  57[H]  ----------------------------<  196[H]  4.4   |  20  |  1.9[H]    Ca    7.6[L]      06 Feb 2025 04:50  Phos  3.2     02-06  Mg     1.5     02-06    TPro  5.8[L]  /  Alb  3.8  /  TBili  0.7  /  DBili  x   /  AST  31  /  ALT  44[H]  /  AlkPhos  48  02-06      PT/INR - ( 06 Feb 2025 04:50 )   PT: >40.00 sec;   INR: 4.00 ratio         PTT - ( 06 Feb 2025 04:50 )  PTT:47.6 sec                                       Urinalysis Basic - ( 06 Feb 2025 04:50 )    Color: x / Appearance: x / SG: x / pH: x  Gluc: 196 mg/dL / Ketone: x  / Bili: x / Urobili: x   Blood: x / Protein: x / Nitrite: x   Leuk Esterase: x / RBC: x / WBC x   Sq Epi: x / Non Sq Epi: x / Bacteria: x                                                  LIVER FUNCTIONS - ( 06 Feb 2025 04:50 )  Alb: 3.8 g/dL / Pro: 5.8 g/dL / ALK PHOS: 48 U/L / ALT: 44 U/L / AST: 31 U/L / GGT: x                                                  Urinalysis with Rflx Culture (collected 04 Feb 2025 14:14)                                                                                       ABG - ( 06 Feb 2025 04:07 )  pH, Arterial: 7.40  pH, Blood: x     /  pCO2: 38    /  pO2: 69    / HCO3: 24    / Base Excess: -1.1  /  SaO2: 95.7                MEDICATIONS  (STANDING):  aMIOdarone    Tablet 200 milliGRAM(s) Oral two times a day  aMIOdarone Infusion 0.501 mG/Min (16.7 mL/Hr) IV Continuous <Continuous>  calcium acetate 667 milliGRAM(s) Oral three times a day with meals  chlorhexidine 2% Cloths 1 Application(s) Topical daily  dexAMETHasone  Injectable 6 milliGRAM(s) IV Push every 24 hours  ezetimibe 10 milliGRAM(s) Oral daily  lidocaine 5% Ointment 1 Application(s) Topical four times a day  meropenem  IVPB      meropenem  IVPB 1000 milliGRAM(s) IV Intermittent every 8 hours  midodrine 10 milliGRAM(s) Oral every 8 hours  pantoprazole    Tablet 40 milliGRAM(s) Oral before breakfast  phenylephrine    Infusion 0.1 MICROgram(s)/kG/Min (1.97 mL/Hr) IV Continuous <Continuous>  rosuvastatin 40 milliGRAM(s) Oral at bedtime  sodium bicarbonate 650 milliGRAM(s) Oral every 8 hours  sodium bicarbonate  Infusion 0.143 mEq/kG/Hr (100 mL/Hr) IV Continuous <Continuous>  vasopressin Infusion 0.04 Unit(s)/Min (6 mL/Hr) IV Continuous <Continuous>    MEDICATIONS  (PRN):

## 2025-02-06 NOTE — CONSULT NOTE ADULT - SUBJECTIVE AND OBJECTIVE BOX
ALLIE TORRE  74y, Male  Allergy: No Known Allergies      CHIEF COMPLAINT:    LOS  2d    HPI:    Patient is a 74-year-old male with past medical history of hypertension, dyslipidemia, mitral valve regurg, A-fib on Coumadin,HFrEF with an EF of 45% in december , and status post AICD presenting for weakness. Patient endorses persistent, complaining generalized weakness, fatigue, anorexia and poor p.o. intake for the past 5-6 weeks. Patient also reports a significant weight loss. Patient  states he had recent blood work which showed worsening kidney function, creatinine was 3.1 in  january up from a baseline of 1,2 in december and currently creatinine in 5.3.        · BP   65 mm Hg/45 mm Hg  · Heart Rate  86 /min  · Temp (C)  36.9 Degrees C oral  · SpO2 (%)  98 % on room air     (04 Feb 2025 13:48)      INFECTIOUS DISEASE HISTORY:  History as above.  ID consulted for antibiotic management for shock  No fevers/chills. No leukocytosis  Started on vanc/cefepime 2/5 --> changed to meropenem    PAST MEDICAL & SURGICAL HISTORY:      FAMILY HISTORY  Family history reviewed and non-contributory      SOCIAL HISTORY  Social History:  Denies etoh use, drug use      ROS  General: Denies rigors, nightsweats  HEENT: Denies headache, rhinorrhea, sore throat, eye pain  CV: Denies CP, palpitations  PULM: Denies wheezing, hemoptysis  GI: Denies hematemesis, hematochezia, melena  : Denies discharge, hematuria  MSK: Denies arthralgias, myalgias  SKIN: Denies rash, lesions  NEURO: Denies paresthesias, weakness  PSYCH: Denies depression, anxiety    VITALS:  T(F): 97.2, Max: 99 (02-05-25 @ 16:00)  HR: 88  BP: --  RR: 19Vital Signs Last 24 Hrs  T(C): 36.2 (06 Feb 2025 04:00), Max: 37.2 (05 Feb 2025 16:00)  T(F): 97.2 (06 Feb 2025 04:00), Max: 99 (05 Feb 2025 16:00)  HR: 88 (06 Feb 2025 06:00) (76 - 186)  BP: --  BP(mean): --  RR: 19 (06 Feb 2025 06:00) (17 - 37)  SpO2: 96% (06 Feb 2025 06:00) (90% - 100%)    Parameters below as of 06 Feb 2025 07:00  Patient On (Oxygen Delivery Method): room air        PHYSICAL EXAM:  Gen: NAD, resting in bed  HEENT: Normocephalic, atraumatic  Neck: supple, no lymphadenopathy  CV: Regular rate & regular rhythm  Lungs: decreased BS at bases, no fremitus  Abdomen: Soft, BS present  Ext: Warm, well perfused  Neuro: non focal, awake  Skin: no rash, no erythema  Lines: no phlebitis    TESTS & MEASUREMENTS:                        10.6  8.35  )-----------( 240      ( 06 Feb 2025 04:50 )             31.5    02-06    141  |  106  |  57[H]  ----------------------------<  196[H]  4.4   |  20  |  1.9[H]    Ca    7.6[L]      06 Feb 2025 04:50  Phos  3.2     02-06  Mg     1.5     02-06    TPro  5.8[L]  /  Alb  3.8  /  TBili  0.7  /  DBili  x   /  AST  31  /  ALT  44[H]  /  AlkPhos  48  02-06      LIVER FUNCTIONS - ( 06 Feb 2025 04:50 )  Alb: 3.8 g/dL / Pro: 5.8 g/dL / ALK PHOS: 48 U/L / ALT: 44 U/L / AST: 31 U/L / GGT: x          Urinalysis Basic - ( 06 Feb 2025 04:50 )    Color: x / Appearance: x / SG: x / pH: x  Gluc: 196 mg/dL / Ketone: x  / Bili: x / Urobili: x  Blood: x / Protein: x / Nitrite: x  Leuk Esterase: x / RBC: x / WBC x  Sq Epi: x / Non Sq Epi: x / Bacteria: x        Urinalysis with Rflx Culture (collected 02-04-25 @ 14:14)        Lactate, Blood: 1.2 mmol/L (02-05-25 @ 06:20)  Lactate, Blood: 1.5 mmol/L (02-04-25 @ 23:35)  Blood Gas Venous - Lactate: 1.7 mmol/L (02-04-25 @ 12:57)  Lactate, Blood: 1.6 mmol/L (02-04-25 @ 10:50)      INFECTIOUS DISEASES TESTING  MRSA PCR Result.: Negative (02-05-25 @ 12:55)  Procalcitonin: 0.42 ng/mL (02-04-25 @ 20:00)      RADIOLOGY & ADDITIONAL TESTS:  I have personally reviewed the last Chest xray  CXR  Xray Chest 1 View- PORTABLE-Urgent:  ACC: [id number] EXAM:  XR CHEST PORTABLE URGENT 1V   ORDERED BY: SOURAV BAJWA    PROCEDURE DATE:  02/05/2025          INTERPRETATION:  CLINICAL HISTORY / REASON FOR EXAM: Shortness of breath.    COMPARISON: Chest radiograph from February 5, 2025.    TECHNIQUE/POSITIONING: Satisfactory. Single image, AP chest radiograph.    FINDINGS:    SUPPORT DEVICES: Left IJ central venous catheter with distal tip  projecting superiorly into the SVC.    CARDIAC/MEDIASTINUM/HILUM: Post sternotomy.    LUNG PARENCHYMA/PLEURA: No focal consolidation or pleural effusion. No  pneumothorax.    SKELETON/SOFT TISSUES: Unchanged.      IMPRESSION:    Interval placement of internal jugular central venous catheter. The  catheter distal tip projects cranially to theSVC.    --- End of Report ---            SCOOTER CASTILLO MD; Attending Radiologist  This document has been electronically signed. Feb 5 2025  6:15PM (02-05-25 @ 15:44)      CT  CT Abdomen and Pelvis No Cont:  ACC: [id number] EXAM:  CT ABDOMEN AND PELVIS   ORDERED BY: AYUSH HERNANDEZ    PROCEDURE DATE:  02/04/2025          INTERPRETATION:  CLINICAL INFORMATION: Worsening renal failure. Weight  loss. Fatigue.    COMPARISON: CT ABDOMEN/PELVIS 5/26/2016.    CONTRAST/COMPLICATIONS:  IV Contrast: None.  Oral Contrast: NONE  .    PROCEDURE:  CT of the Abdomen and Pelvis was performed.  Sagittal and coronal reformats were performed.    FINDINGS:  LOWER CHEST: Bibasilar atelectasis. Sternotomy wires. Coronary stents.    LIVER: 2 right lobe hepatic cysts.  BILE DUCTS: Normal caliber.  GALLBLADDER: Cholecystectomy.  SPLEEN: Within normal limits.  PANCREAS: Within normal limits.  ADRENALS: Within normal limits.  KIDNEYS/URETERS: Multiple nonobstructing leftrenal calculi, largest  measures 4 x 3 x 3 mm (623 Hounsfield units). Bilateral renal cysts.  Trace bilateral perinephric fat stranding, nonspecific. No hydronephrosis  bilaterally.    BLADDER: Multiple bladder diverticula seen bilaterally.  REPRODUCTIVE ORGANS: Unremarkable at CT.    BOWEL: No bowel obstruction. Colonic diverticulosis. Appendix is normal  (4-87). Sigmoid anastomosis noted. Large hiatal hernia.  PERITONEUM/RETROPERITONEUM: Within normal limits.  VESSELS: Atherosclerosis.  LYMPH NODES: No lymphadenopathy.  ABDOMINAL WALL: Multiple ventral wall hernias containing fat and  nonobstructed bowel. Bilateral prominent fat-containing inguinal hernias.  BONES: Degenerative changes.    IMPRESSION:  No acute abnormality in the abdomen and pelvis. No hydronephrosis.    Left-sided nephrolithiasis.    --- End of Report ---          JORGE ALFONSO MD; Resident Radiologist  This document has been electronically signed.  SEDRICK HUIZAR MD; Attending Radiologist  This document has been electronically signed. Feb 4 2025  3:08PM (02-04-25 @ 13:36)      CARDIOLOGY TESTING  12 Lead ECG:  Ventricular Rate 81 BPM    QRS Duration 100 ms    Q-T Interval 400 ms    QTC Calculation(Bazett) 464 ms    R Axis 66 degrees    T Axis 39 degrees    Diagnosis Line Atrial fibrillation with premature ventricular or aberrantly conducted  complexes  Abnormal ECG    Confirmed by Ken Ashton ([id number]) on 2/5/2025 5:02:24 PM (02-04-25 @ 11:03)      MEDICATIONS  aMIOdarone    Tablet 200 Oral two times a day  aMIOdarone Infusion 0.501 IV Continuous <Continuous>  calcium acetate 667 Oral three times a day with meals  chlorhexidine 2% Cloths 1 Topical daily  dexAMETHasone  Injectable 6 IV Push every 24 hours  ezetimibe 10 Oral daily  lidocaine 5% Ointment 1 Topical four times a day  meropenem  IVPB    meropenem  IVPB 1000 IV Intermittent every 8 hours  midodrine 10 Oral every 8 hours  pantoprazole    Tablet 40 Oral before breakfast  phenylephrine    Infusion 0.1 IV Continuous <Continuous>  rosuvastatin 40 Oral at bedtime  sodium bicarbonate 650 Oral every 8 hours  sodium bicarbonate  Infusion 0.143 IV Continuous <Continuous>  vasopressin Infusion 0.04 IV Continuous <Continuous>      Weight  Weight (kg): 105.2 (02-04-25 @ 16:30)    ANTIBIOTICS:  meropenem  IVPB      meropenem  IVPB 1000 milliGRAM(s) IV Intermittent every 8 hours      ALLERGIES:  No Known Allergies     ALLIE TORRE  74y, Male  Allergy: No Known Allergies      CHIEF COMPLAINT:    LOS  2d    HPI:    Patient is a 74-year-old male with past medical history of hypertension, dyslipidemia, mitral valve regurg, A-fib on Coumadin,HFrEF with an EF of 45% in december , and status post AICD presenting for weakness. Patient endorses persistent, complaining generalized weakness, fatigue, anorexia and poor p.o. intake for the past 5-6 weeks. Patient also reports a significant weight loss. Patient  states he had recent blood work which showed worsening kidney function, creatinine was 3.1 in  january up from a baseline of 1,2 in december and currently creatinine in 5.3.        · BP   65 mm Hg/45 mm Hg  · Heart Rate  86 /min  · Temp (C)  36.9 Degrees C oral  · SpO2 (%)  98 % on room air     (04 Feb 2025 13:48)      INFECTIOUS DISEASE HISTORY:  History as above.  ID consulted for antibiotic management for shock  No fevers/chills. No leukocytosis  Started on vanc/cefepime 2/5 --> changed to meropenem  He reports feeling unwell/tired since New Years.   Has not had any fevers or night sweats.   Denies any nausea, vomiting, abdominal pain.   Has had intermittent episodes of loose stool, but no consistent episodes of diarrhea.   Denies dysuria, hematuria.   Denies any skin rashes, new joint pains.   No recent travel.   No sick contacts.   No recent dental procedures -- does report having a fractured tooth which he has yet to make an appointment for. Not causing him pain.     PAST MEDICAL & SURGICAL HISTORY:      FAMILY HISTORY  Family history reviewed and non-contributory      SOCIAL HISTORY  Social History:  Denies etoh use, drug use      ROS  General: Denies rigors, nightsweats  HEENT: Denies headache, rhinorrhea, sore throat, eye pain  CV: Denies CP, palpitations  PULM: Denies wheezing, hemoptysis  GI: Denies hematemesis, hematochezia, melena  : Denies discharge, hematuria  MSK: Denies arthralgias, myalgias  SKIN: Denies rash, lesions  NEURO: Denies paresthesias, weakness  PSYCH: Denies depression, anxiety    VITALS:  T(F): 97.2, Max: 99 (02-05-25 @ 16:00)  HR: 88  BP: --  RR: 19Vital Signs Last 24 Hrs  T(C): 36.2 (06 Feb 2025 04:00), Max: 37.2 (05 Feb 2025 16:00)  T(F): 97.2 (06 Feb 2025 04:00), Max: 99 (05 Feb 2025 16:00)  HR: 88 (06 Feb 2025 06:00) (76 - 186)  BP: --  BP(mean): --  RR: 19 (06 Feb 2025 06:00) (17 - 37)  SpO2: 96% (06 Feb 2025 06:00) (90% - 100%)    Parameters below as of 06 Feb 2025 07:00  Patient On (Oxygen Delivery Method): room air        PHYSICAL EXAM:  Gen: NAD, resting in bed  HEENT: Normocephalic, atraumatic  Neck: supple, no lymphadenopathy  CV: Regular rate & regular rhythm  Lungs: decreased BS at bases, no fremitus  Abdomen: Soft, BS present  Ext: Warm, well perfused  Neuro: non focal, awake  Skin: no rash, no erythema  Lines: no phlebitis    TESTS & MEASUREMENTS:                        10.6  8.35  )-----------( 240      ( 06 Feb 2025 04:50 )             31.5    02-06    141  |  106  |  57[H]  ----------------------------<  196[H]  4.4   |  20  |  1.9[H]    Ca    7.6[L]      06 Feb 2025 04:50  Phos  3.2     02-06  Mg     1.5     02-06    TPro  5.8[L]  /  Alb  3.8  /  TBili  0.7  /  DBili  x   /  AST  31  /  ALT  44[H]  /  AlkPhos  48  02-06      LIVER FUNCTIONS - ( 06 Feb 2025 04:50 )  Alb: 3.8 g/dL / Pro: 5.8 g/dL / ALK PHOS: 48 U/L / ALT: 44 U/L / AST: 31 U/L / GGT: x          Urinalysis Basic - ( 06 Feb 2025 04:50 )    Color: x / Appearance: x / SG: x / pH: x  Gluc: 196 mg/dL / Ketone: x  / Bili: x / Urobili: x  Blood: x / Protein: x / Nitrite: x  Leuk Esterase: x / RBC: x / WBC x  Sq Epi: x / Non Sq Epi: x / Bacteria: x        Urinalysis with Rflx Culture (collected 02-04-25 @ 14:14)        Lactate, Blood: 1.2 mmol/L (02-05-25 @ 06:20)  Lactate, Blood: 1.5 mmol/L (02-04-25 @ 23:35)  Blood Gas Venous - Lactate: 1.7 mmol/L (02-04-25 @ 12:57)  Lactate, Blood: 1.6 mmol/L (02-04-25 @ 10:50)      INFECTIOUS DISEASES TESTING  MRSA PCR Result.: Negative (02-05-25 @ 12:55)  Procalcitonin: 0.42 ng/mL (02-04-25 @ 20:00)      RADIOLOGY & ADDITIONAL TESTS:  I have personally reviewed the last Chest xray  CXR  Xray Chest 1 View- PORTABLE-Urgent:  ACC: [id number] EXAM:  XR CHEST PORTABLE URGENT 1V   ORDERED BY: SOURAV BAJWA    PROCEDURE DATE:  02/05/2025          INTERPRETATION:  CLINICAL HISTORY / REASON FOR EXAM: Shortness of breath.    COMPARISON: Chest radiograph from February 5, 2025.    TECHNIQUE/POSITIONING: Satisfactory. Single image, AP chest radiograph.    FINDINGS:    SUPPORT DEVICES: Left IJ central venous catheter with distal tip  projecting superiorly into the SVC.    CARDIAC/MEDIASTINUM/HILUM: Post sternotomy.    LUNG PARENCHYMA/PLEURA: No focal consolidation or pleural effusion. No  pneumothorax.    SKELETON/SOFT TISSUES: Unchanged.      IMPRESSION:    Interval placement of internal jugular central venous catheter. The  catheter distal tip projects cranially to theSVC.    --- End of Report ---            SCOOTER CASTILLO MD; Attending Radiologist  This document has been electronically signed. Feb 5 2025  6:15PM (02-05-25 @ 15:44)      CT  CT Abdomen and Pelvis No Cont:  ACC: [id number] EXAM:  CT ABDOMEN AND PELVIS   ORDERED BY: AYUSH HERNANDEZ    PROCEDURE DATE:  02/04/2025          INTERPRETATION:  CLINICAL INFORMATION: Worsening renal failure. Weight  loss. Fatigue.    COMPARISON: CT ABDOMEN/PELVIS 5/26/2016.    CONTRAST/COMPLICATIONS:  IV Contrast: None.  Oral Contrast: NONE  .    PROCEDURE:  CT of the Abdomen and Pelvis was performed.  Sagittal and coronal reformats were performed.    FINDINGS:  LOWER CHEST: Bibasilar atelectasis. Sternotomy wires. Coronary stents.    LIVER: 2 right lobe hepatic cysts.  BILE DUCTS: Normal caliber.  GALLBLADDER: Cholecystectomy.  SPLEEN: Within normal limits.  PANCREAS: Within normal limits.  ADRENALS: Within normal limits.  KIDNEYS/URETERS: Multiple nonobstructing leftrenal calculi, largest  measures 4 x 3 x 3 mm (623 Hounsfield units). Bilateral renal cysts.  Trace bilateral perinephric fat stranding, nonspecific. No hydronephrosis  bilaterally.    BLADDER: Multiple bladder diverticula seen bilaterally.  REPRODUCTIVE ORGANS: Unremarkable at CT.    BOWEL: No bowel obstruction. Colonic diverticulosis. Appendix is normal  (4-87). Sigmoid anastomosis noted. Large hiatal hernia.  PERITONEUM/RETROPERITONEUM: Within normal limits.  VESSELS: Atherosclerosis.  LYMPH NODES: No lymphadenopathy.  ABDOMINAL WALL: Multiple ventral wall hernias containing fat and  nonobstructed bowel. Bilateral prominent fat-containing inguinal hernias.  BONES: Degenerative changes.    IMPRESSION:  No acute abnormality in the abdomen and pelvis. No hydronephrosis.    Left-sided nephrolithiasis.    --- End of Report ---          JORGE ALFONSO MD; Resident Radiologist  This document has been electronically signed.  SEDRICK HUIZAR MD; Attending Radiologist  This document has been electronically signed. Feb 4 2025  3:08PM (02-04-25 @ 13:36)      CARDIOLOGY TESTING  12 Lead ECG:  Ventricular Rate 81 BPM    QRS Duration 100 ms    Q-T Interval 400 ms    QTC Calculation(Bazett) 464 ms    R Axis 66 degrees    T Axis 39 degrees    Diagnosis Line Atrial fibrillation with premature ventricular or aberrantly conducted  complexes  Abnormal ECG    Confirmed by Ken Ashton ([id number]) on 2/5/2025 5:02:24 PM (02-04-25 @ 11:03)      MEDICATIONS  aMIOdarone    Tablet 200 Oral two times a day  aMIOdarone Infusion 0.501 IV Continuous <Continuous>  calcium acetate 667 Oral three times a day with meals  chlorhexidine 2% Cloths 1 Topical daily  dexAMETHasone  Injectable 6 IV Push every 24 hours  ezetimibe 10 Oral daily  lidocaine 5% Ointment 1 Topical four times a day  meropenem  IVPB    meropenem  IVPB 1000 IV Intermittent every 8 hours  midodrine 10 Oral every 8 hours  pantoprazole    Tablet 40 Oral before breakfast  phenylephrine    Infusion 0.1 IV Continuous <Continuous>  rosuvastatin 40 Oral at bedtime  sodium bicarbonate 650 Oral every 8 hours  sodium bicarbonate  Infusion 0.143 IV Continuous <Continuous>  vasopressin Infusion 0.04 IV Continuous <Continuous>      Weight  Weight (kg): 105.2 (02-04-25 @ 16:30)    ANTIBIOTICS:  meropenem  IVPB      meropenem  IVPB 1000 milliGRAM(s) IV Intermittent every 8 hours      ALLERGIES:  No Known Allergies

## 2025-02-06 NOTE — DIETITIAN INITIAL EVALUATION ADULT - PERTINENT MEDS FT
MEDICATIONS  (STANDING):  aMIOdarone    Tablet 200 milliGRAM(s) Oral two times a day  aMIOdarone Infusion 0.501 mG/Min (16.7 mL/Hr) IV Continuous <Continuous>  calcium acetate 667 milliGRAM(s) Oral three times a day with meals  cefTRIAXone   IVPB 2000 milliGRAM(s) IV Intermittent every 24 hours  chlorhexidine 2% Cloths 1 Application(s) Topical daily  dexAMETHasone  Injectable 6 milliGRAM(s) IV Push every 24 hours  ezetimibe 10 milliGRAM(s) Oral daily  lactated ringers. 1000 milliLiter(s) (100 mL/Hr) IV Continuous <Continuous>  lidocaine 5% Ointment 1 Application(s) Topical four times a day  midodrine 10 milliGRAM(s) Oral every 8 hours  pantoprazole    Tablet 40 milliGRAM(s) Oral before breakfast  phenylephrine    Infusion 0.1 MICROgram(s)/kG/Min (1.97 mL/Hr) IV Continuous <Continuous>  rosuvastatin 40 milliGRAM(s) Oral at bedtime  sodium bicarbonate 650 milliGRAM(s) Oral every 8 hours  vasopressin Infusion 0.04 Unit(s)/Min (6 mL/Hr) IV Continuous <Continuous>    MEDICATIONS  (PRN):

## 2025-02-06 NOTE — DIETITIAN INITIAL EVALUATION ADULT - NS FNS DIET ORDER
Diet, DASH/TLC:   Sodium & Cholesterol Restricted  For patients receiving Renal Replacement - No Protein Restr, No Conc K, No Conc Phos, Low  Sodium (RENAL) (02-05-25 @ 08:08) [Active]

## 2025-02-06 NOTE — DIETITIAN INITIAL EVALUATION ADULT - ORAL INTAKE PTA/DIET HISTORY
Patient reports poor appetite and PO intake PTA 2/2 lethargy, altered taste. Consuming 1-2 meals/day - usually breakfast. no vitamin/mineral supplements taken. No oral nutrition supplements provided. No chewing/swallowing difficulties reported. NKFA, no food intolerances reported.  Patient reports poor appetite and PO intake PTA 2/2 lethargy, altered taste. Consuming 1-2 meals/day - usually breakfast. no vitamin/mineral supplements taken. No oral nutrition supplements provided. No chewing/swallowing difficulties reported. NKFA, reports lactose intolerance - but avoids drinking milk.

## 2025-02-06 NOTE — PROGRESS NOTE ADULT - ASSESSMENT
Patient is a 74-year-old male with past medical history of hypertension, dyslipidemia, mitral valve regurg, A-fib on Coumadin,HFrEF with an EF of 45% in december , and status post AICD presenting for weakness. Patient endorses persistent, complaining generalized weakness, fatigue, anorexia and poor p.o. intake for the past 5-6 weeks. Patient also reports a significant weight loss. Patient  states he had recent blood work which showed worsening kidney function, creatinine was 3.1 in  january up from a baseline of 1,2 in december and currently creatinine in 5.3.   Patient also hypotensive on pressors now, had rapid afib started on amio.    #Shock, unclear source  -Pt was hypotensive, no fever, no leukocytosis  -Hb stable, so signs of bleeding  -TTE: EF 45-50% with moderate TR only  -UA negative  -CXR normal  -CTAP negative  -RVP negative, MRSA negative  -Received fluids, not responsive, started on levophed  -After having rapid rate Afib on 2/5, switched to phenylephrine and vasopressin was added, today still on vaso 0.04 and balbir 2.4  -Cx taken, started empirically on cefepime, then ora, now ID cs recommending ceftriaxone with re-dosing of vanco till blood cx results  -F/u vanco level to re-dose  -F/u urine and blood cx  -F/u TSH and AM cortisol levels  -Started on dexa 6 mg, will give 1L LR bolus and assess response    #Afib, was in RVR  -Amio loaded and started on amio drip on 2/5, switching to PO amio today  -Received 2 doses of metoprolol 5mg IV each  -Now rate controlled  -On metoprolol 200mg at home, holding now in the setting of hypotension  -EP consulted  -On coumadin at home 5 mg daily (since it's a valvular Afib), holding now as INR supra-therapeutic (10.4 -> 7 ->4 today)  -Would resume coumadin once INR<3    #MARIANNE  #HAGMA  -Crea trended up as per patient from 1.2 baseline, to 3.1 in Jan, and now 5.3 on admission  -Now trending down, today 1.9  -Holding home aldactone, ACEi, entresto and farxiga  -Not euglycemic DKA as BHB negative  -Art inserted for accurate I/O  -On  cc/h as per nephro  -PO HCO3 started  -Midodrine 10mg q8  -KBUS negative  -MM w/u follow up    #HFimpEF  -TTE showed EF 45-50%, mod TR, s/p mitral annuloplasty  -Holding all his GDMT now as pt in shock, would resume progressively when out of shock    #Diet: DASH  #DVT pro: For now off as INR elevated  #GI pro: pantoprazole  #Activity: as tolerated  #Dispo: ICU  #Code status: Full code as per patient

## 2025-02-06 NOTE — DIETITIAN INITIAL EVALUATION ADULT - OTHER INFO
Patient is a 74-year-old male with past medical history of hypertension, dyslipidemia, mitral valve regurg, A-fib on Coumadin,HFrEF with an EF of 45% in december , and status post AICD presenting for weakness. Patient endorses persistent, complaining generalized weakness, fatigue, anorexia and poor p.o. intake for the past 5-6 weeks. Patient also reports a significant weight loss.    #Shock, unclear source  -Pt was hypotensive, no fever, no leukocytosis  #Afib, was in RVR  #MARIANNE  #HAGMA  #HFimpEF  -Holding all his GDMT now as pt in shock, would resume progressively when out of shock    Patient reports  lbs - endorses weight has been stable  vs current dosing weight 105.2 kg   <1% weight loss   Patient reports he has been attempting to consuming meals - reports intake of 50%

## 2025-02-06 NOTE — PROGRESS NOTE ADULT - SUBJECTIVE AND OBJECTIVE BOX
seen and examined  24 h events noted   no distress         PAST HISTORY  --------------------------------------------------------------------------------  No significant changes to PMH, PSH, FHx, SHx, unless otherwise noted    ALLERGIES & MEDICATIONS  --------------------------------------------------------------------------------  Allergies    No Known Allergies    Intolerances      Standing Inpatient Medications  aMIOdarone    Tablet 200 milliGRAM(s) Oral two times a day  aMIOdarone Infusion 0.501 mG/Min IV Continuous <Continuous>  calcium acetate 667 milliGRAM(s) Oral three times a day with meals  chlorhexidine 2% Cloths 1 Application(s) Topical daily  dexAMETHasone  Injectable 6 milliGRAM(s) IV Push every 24 hours  ezetimibe 10 milliGRAM(s) Oral daily  lidocaine 5% Ointment 1 Application(s) Topical four times a day  meropenem  IVPB      meropenem  IVPB 1000 milliGRAM(s) IV Intermittent every 8 hours  midodrine 10 milliGRAM(s) Oral every 8 hours  pantoprazole    Tablet 40 milliGRAM(s) Oral before breakfast  phenylephrine    Infusion 0.1 MICROgram(s)/kG/Min IV Continuous <Continuous>  rosuvastatin 40 milliGRAM(s) Oral at bedtime  sodium bicarbonate 650 milliGRAM(s) Oral every 8 hours  sodium bicarbonate  Infusion 0.143 mEq/kG/Hr IV Continuous <Continuous>  vasopressin Infusion 0.04 Unit(s)/Min IV Continuous <Continuous>      VITALS/PHYSICAL EXAM  --------------------------------------------------------------------------------  T(C): 36.2 (02-06-25 @ 04:00), Max: 37.2 (02-05-25 @ 16:00)  HR: 87 (02-06-25 @ 07:00) (76 - 186)  BP: --  RR: 16 (02-06-25 @ 07:00) (16 - 37)  SpO2: 95% (02-06-25 @ 07:00) (90% - 99%)  Wt(kg): --  Height (cm): 190.5 (02-04-25 @ 16:30)  Weight (kg): 105.2 (02-04-25 @ 16:30)  BMI (kg/m2): 29 (02-04-25 @ 16:30)  BSA (m2): 2.34 (02-04-25 @ 16:30)      02-05-25 @ 07:01  -  02-06-25 @ 07:00  --------------------------------------------------------  IN: 5154.1 mL / OUT: 4569 mL / NET: 585.1 mL    02-06-25 @ 07:01  -  02-06-25 @ 07:37  --------------------------------------------------------  IN: 122.7 mL / OUT: 100 mL / NET: 22.7 mL      Physical Exam:  	Gen: NAD  	Pulm: CTA B/L  	CV:  S1S2; no rub  	Abd: soft, nontender/nondistended  	LE: no edema  	    LABS/STUDIES  --------------------------------------------------------------------------------              10.6   8.35  >-----------<  240      [02-06-25 @ 04:50]              31.5     141  |  106  |  57  ----------------------------<  196      [02-06-25 @ 04:50]  4.4   |  20  |  1.9        Ca     7.6     [02-06-25 @ 04:50]      Mg     1.5     [02-06-25 @ 04:50]      Phos  3.2     [02-06-25 @ 04:50]    TPro  5.8  /  Alb  3.8  /  TBili  0.7  /  DBili  x   /  AST  31  /  ALT  44  /  AlkPhos  48  [02-06-25 @ 04:50]    PT/INR: PT >40.00, INR 4.00       [02-06-25 @ 04:50]  PTT: 47.6       [02-06-25 @ 04:50]      Creatinine Trend:  SCr 1.9 [02-06 @ 04:50]  SCr 2.4 [02-05 @ 16:59]  SCr 2.7 [02-05 @ 13:21]  SCr 3.2 [02-05 @ 06:20]  SCr 3.8 [02-04 @ 23:35]    Urinalysis - [02-06-25 @ 04:50]      Color  / Appearance  / SG  / pH       Gluc 196 / Ketone   / Bili  / Urobili        Blood  / Protein  / Leuk Est  / Nitrite       RBC  / WBC  / Hyaline  / Gran  / Sq Epi  / Non Sq Epi  / Bacteria     Urine Creatinine 46      [02-05-25 @ 08:43]  Urine Protein 28      [02-05-25 @ 08:43]  Urine Sodium 59.0      [02-05-25 @ 08:43]  Urine Urea Nitrogen 456      [02-05-25 @ 08:43]  Urine Potassium 9      [02-05-25 @ 08:43]  Urine Osmolality 358      [02-05-25 @ 08:43]

## 2025-02-06 NOTE — PROGRESS NOTE ADULT - SUBJECTIVE AND OBJECTIVE BOX
SUBJECTIVE/OVERNIGHT EVENTS  Today is hospital day 2d. This morning patient was seen and examined at bedside, resting comfortably in bed. No acute or major events overnight.    MEDICATIONS  STANDING MEDICATIONS  aMIOdarone    Tablet 200 milliGRAM(s) Oral two times a day  aMIOdarone Infusion 0.501 mG/Min IV Continuous <Continuous>  calcium acetate 667 milliGRAM(s) Oral three times a day with meals  cefTRIAXone   IVPB 2000 milliGRAM(s) IV Intermittent every 24 hours  chlorhexidine 2% Cloths 1 Application(s) Topical daily  dexAMETHasone  Injectable 6 milliGRAM(s) IV Push every 24 hours  ezetimibe 10 milliGRAM(s) Oral daily  lactated ringers. 1000 milliLiter(s) IV Continuous <Continuous>  lidocaine 5% Ointment 1 Application(s) Topical four times a day  midodrine 10 milliGRAM(s) Oral every 8 hours  pantoprazole    Tablet 40 milliGRAM(s) Oral before breakfast  phenylephrine    Infusion 0.1 MICROgram(s)/kG/Min IV Continuous <Continuous>  rosuvastatin 40 milliGRAM(s) Oral at bedtime  sodium bicarbonate 650 milliGRAM(s) Oral every 8 hours  vasopressin Infusion 0.04 Unit(s)/Min IV Continuous <Continuous>    PRN MEDICATIONS    VITALS  T(F): 98.5 (02-06-25 @ 08:00), Max: 99 (02-05-25 @ 16:00)  HR: 86 (02-06-25 @ 11:00) (76 - 100)  BP: --  RR: 25 (02-06-25 @ 11:00) (16 - 37)  SpO2: 94% (02-06-25 @ 11:00) (90% - 98%)    PHYSICAL EXAM  CCO*3  GBAE, clear lungs  Regular S1S2, no murmurs  Soft abdomen, non tender  No LLE    ( x ) Indwelling Art Catheter   Date insterted:    Reason ( x ) Critical illness     (  ) urinary retention    (  ) Accurate Ins/Outs Monitoring     (  ) CMO patient    ( x ) Central Line  Date inserted: 2/5/25  Location: (  ) Right IJ   ( x ) Left IJ   (  ) Right Fem   (  ) Left Fem      LABS             10.6   8.35  )-----------( 240      ( 02-06-25 @ 04:50 )             31.5     141  |  106  |  57  -------------------------<  196   02-06-25 @ 04:50  4.4  |  20  |  1.9    Ca      7.6     02-06-25 @ 04:50  Phos   3.2     02-06-25 @ 04:50  Mg     1.5     02-06-25 @ 04:50    TPro  5.8  /  Alb  3.8  /  TBili  0.7  /  DBili  x   /  AST  31  /  ALT  44  /  AlkPhos  48  /  GGT  x     02-06-25 @ 04:50    PT/INR - ( 02-06-25 @ 04:50 )   PT: >40.00 sec[H];   INR: 4.00 ratio[H]  PTT - ( 02-06-25 @ 04:50 )  PTT:47.6 sec    Troponin T, High Sensitivity Result: 33 ng/L (02-05-25 @ 00:40)  Troponin T, High Sensitivity Result: 33 ng/L (02-04-25 @ 23:35)  Troponin T, High Sensitivity Result: 34 ng/L (02-04-25 @ 20:00)    Urinalysis Basic - ( 06 Feb 2025 04:50 )    Color: x / Appearance: x / SG: x / pH: x  Gluc: 196 mg/dL / Ketone: x  / Bili: x / Urobili: x   Blood: x / Protein: x / Nitrite: x   Leuk Esterase: x / RBC: x / WBC x   Sq Epi: x / Non Sq Epi: x / Bacteria: x      ABG - ( 06 Feb 2025 04:07 )  pH, Arterial: 7.40  pH, Blood: x     /  pCO2: 38    /  pO2: 69    / HCO3: 24    / Base Excess: -1.1  /  SaO2: 95.7                Urinalysis with Rflx Culture (collected 04 Feb 2025 14:14)      IMAGING

## 2025-02-06 NOTE — DIETITIAN INITIAL EVALUATION ADULT - COLLABORATION WITH OTHER PROVIDERS
Interventions: meals and snacks, medical food supplements, coordination of care  Monitoring/Evaluation: energy intake, weight, labs, skin status, NFPF, BM, GI s/s

## 2025-02-06 NOTE — DIETITIAN INITIAL EVALUATION ADULT - LAB (SPECIFY)
2/6: H/H 10.6 / 31.5 (L), BUN 57 (H), Cr 1.9 (H), Glucose 196 (H), Ca 7.6 (L), ALT/SGPT 44 (H), eGFR 37 (L), Mg 1.5 (L)

## 2025-02-06 NOTE — DIETITIAN INITIAL EVALUATION ADULT - ORAL NUTRITION SUPPLEMENTS
ADD [facility] Renal Support 1.8 supplement 1x/day (450 kcal, 20 gm Protein) to aid in optimizing kcal and protein intake

## 2025-02-06 NOTE — PROGRESS NOTE ADULT - ASSESSMENT
Patient is a 74-year-old male with past medical history of hypertension, dyslipidemia, mitral valve regurg, A-fib on Coumadin, cardiomyopathy, CHF, and status post AICD presenting for weakness. Renal called for MARIANNE   When asked denied dysuria no hematuria no abdominal pain no diarrhea no focal motor sensory deficit   on Farxiga and ACE   #MARIANNE  prerenal in nature   # Acidosis due to DKA due to MARIANNE too   # hyperkalemia mild   - Farxiga and entresto on hold   - cr trending down   - non oliguric   - no hydro on ct / stones   - LR at 100 cc per hour / no bicarbonate drip   - ph noted  at goal , dc phoslo  if level < 2.5/ hypocalcemia check vit D / PTH levels   - replete magnesium to 2   - pressors as needed  /  midodrine 10 q 8   - off lokelma   -  pr/ cr ratio minimal proteinuria   - h/h at goal  -  continue sodium bicarbonate 650 q 8    will follow

## 2025-02-06 NOTE — DIETITIAN INITIAL EVALUATION ADULT - ADD RECOMMEND
1) Continue current diet order  2) Monitor electrolytes, BG, renal profile  Mg noted to be low - replete PRN  3) Monitor BM, GI s/s

## 2025-02-06 NOTE — CONSULT NOTE ADULT - ASSESSMENT
ASSESSMENT  Patient is a 74-year-old male with past medical history of hypertension, dyslipidemia, mitral valve regurg, A-fib on Coumadin,HFrEF with an EF of 45% in december , and status post AICD presenting for weakness.    IMPRESSION  #Shock  -  Xray Chest 1 View-PORTABLE IMMEDIATE (02.04.25 @ 11:42): Lung parenchyma/Pleura: No focal parenchymal opacities, pleural  effusions, or pneumothorax.  - CT Abdomen and Pelvis No Cont (02.04.25 @ 13:36): IMPRESSION: No acute abnormality in the abdomen and pelvis. No hydronephrosis. Left-sided nephrolithiasis.  - Flu/COVID/RSV negative  - MRSA nares negative  - WBC Count: 8.86 K/uL (02.04.25 @ 10:50) on admission  - TTE 2/4 - EF 45-50%      #MARIANNE/Hyperkalemia  #Weight Loss  #Atrial Fibrillation on coumadin  #CHF s/p AICD    #Obesity BMI (kg/m2): 29  #Abx allergy: No Known Allergies      RECOMMENDATIONS  This is a preliminary incomplete pended note, all final recommendations to follow after interview and examination of the patient.    Please call or message on [employer] Teams if with any questions.  Spectra [phone]     ASSESSMENT  Patient is a 74-year-old male with past medical history of hypertension, dyslipidemia, mitral valve regurg, A-fib on Coumadin,HFrEF with an EF of 45% in december , and status post AICD presenting for weakness.    IMPRESSION  #Shock, undifferentiated   -  Xray Chest 1 View-PORTABLE IMMEDIATE (02.04.25 @ 11:42): Lung parenchyma/Pleura: No focal parenchymal opacities, pleural  effusions, or pneumothorax.  - CT Abdomen and Pelvis No Cont (02.04.25 @ 13:36): IMPRESSION: No acute abnormality in the abdomen and pelvis. No hydronephrosis. Left-sided nephrolithiasis.  - Flu/COVID/RSV negative  - MRSA nares negative  - WBC Count: 8.86 K/uL (02.04.25 @ 10:50) on admission  - TTE 2/4 - EF 45-50%      #MARIANNE/Hyperkalemia  #Weight Loss  #Atrial Fibrillation on coumadin  #CHF s/p AICD    #Obesity BMI (kg/m2): 29  #Abx allergy: No Known Allergies      RECOMMENDATIONS  - No clear infectious etiology based on physical exam/imaging work thus far; also no clear localizing symptoms   - he does not have any risk factors for ESBL (recent hospitalizations/colonizations) -- would narrow to ceftriaxone 2g daily while awaiting blood cx   - check vancomycin level today -- if < 15, can give vancomycin 10 mg/kg x 1   - follow-up blood cx -- has AICD, but no clear symptoms/risk factors for bacteremia   - consider TSH and morning cortisol   - monitor for fevers    Please call or message on [employer] Teams if with any questions.  Spectra [phone]     ASSESSMENT  Patient is a 74-year-old male with past medical history of hypertension, dyslipidemia, mitral valve regurg, A-fib on Coumadin,HFrEF with an EF of 45% in december , and status post AICD presenting for weakness.    IMPRESSION  #Shock, undifferentiated   -  Xray Chest 1 View-PORTABLE IMMEDIATE (02.04.25 @ 11:42): Lung parenchyma/Pleura: No focal parenchymal opacities, pleural  effusions, or pneumothorax.  - CT Abdomen and Pelvis No Cont (02.04.25 @ 13:36): IMPRESSION: No acute abnormality in the abdomen and pelvis. No hydronephrosis. Left-sided nephrolithiasis.  - Flu/COVID/RSV negative  - MRSA nares negative  - WBC Count: 8.86 K/uL (02.04.25 @ 10:50) on admission  - TTE 2/4 - EF 45-50%      #MARIANNE/Hyperkalemia  #Weight Loss  #Atrial Fibrillation on coumadin  #CHF s/p AICD    #Obesity BMI (kg/m2): 29  #Abx allergy: No Known Allergies      RECOMMENDATIONS  - No clear infectious etiology based on physical exam/imaging work thus far; also no clear localizing symptoms   - he does not have any risk factors for ESBL (recent hospitalizations/colonizations) -- would narrow to ceftriaxone 2g daily while awaiting blood cx   - check vancomycin level today -- if < 15, can give vancomycin 10 mg/kg x 1   - follow-up TSH and consider morning cortisol   - monitor for fevers    Please call or message on [employer] Teams if with any questions.  Spectra [phone]

## 2025-02-07 LAB
ACTH SER-ACNC: 2.5 PG/ML — LOW (ref 7.2–63.3)
ACTH STIM CORTISOL BASELINE: 2.4 UG/DL — LOW (ref 6–18.4)
ALBUMIN SERPL ELPH-MCNC: 3.4 G/DL — LOW (ref 3.5–5.2)
ALBUMIN SERPL ELPH-MCNC: 3.5 G/DL — SIGNIFICANT CHANGE UP (ref 3.5–5.2)
ALP SERPL-CCNC: 41 U/L — SIGNIFICANT CHANGE UP (ref 30–115)
ALP SERPL-CCNC: 44 U/L — SIGNIFICANT CHANGE UP (ref 30–115)
ALT FLD-CCNC: 38 U/L — SIGNIFICANT CHANGE UP (ref 0–41)
ALT FLD-CCNC: 44 U/L — HIGH (ref 0–41)
ANION GAP SERPL CALC-SCNC: 11 MMOL/L — SIGNIFICANT CHANGE UP (ref 7–14)
ANION GAP SERPL CALC-SCNC: 12 MMOL/L — SIGNIFICANT CHANGE UP (ref 7–14)
AST SERPL-CCNC: 35 U/L — SIGNIFICANT CHANGE UP (ref 0–41)
AST SERPL-CCNC: 43 U/L — HIGH (ref 0–41)
BASOPHILS # BLD AUTO: 0.01 K/UL — SIGNIFICANT CHANGE UP (ref 0–0.2)
BASOPHILS # BLD AUTO: 0.01 K/UL — SIGNIFICANT CHANGE UP (ref 0–0.2)
BASOPHILS NFR BLD AUTO: 0.1 % — SIGNIFICANT CHANGE UP (ref 0–1)
BASOPHILS NFR BLD AUTO: 0.1 % — SIGNIFICANT CHANGE UP (ref 0–1)
BILIRUB SERPL-MCNC: 0.7 MG/DL — SIGNIFICANT CHANGE UP (ref 0.2–1.2)
BILIRUB SERPL-MCNC: 0.8 MG/DL — SIGNIFICANT CHANGE UP (ref 0.2–1.2)
BLD GP AB SCN SERPL QL: SIGNIFICANT CHANGE UP
BUN SERPL-MCNC: 32 MG/DL — HIGH (ref 10–20)
BUN SERPL-MCNC: 35 MG/DL — HIGH (ref 10–20)
CALCIUM SERPL-MCNC: 7.2 MG/DL — LOW (ref 8.4–10.4)
CALCIUM SERPL-MCNC: 8 MG/DL — LOW (ref 8.4–10.4)
CHLORIDE SERPL-SCNC: 107 MMOL/L — SIGNIFICANT CHANGE UP (ref 98–110)
CHLORIDE SERPL-SCNC: 110 MMOL/L — SIGNIFICANT CHANGE UP (ref 98–110)
CO2 SERPL-SCNC: 23 MMOL/L — SIGNIFICANT CHANGE UP (ref 17–32)
CO2 SERPL-SCNC: 24 MMOL/L — SIGNIFICANT CHANGE UP (ref 17–32)
CORTIS SERPL-MCNC: 2.3 UG/DL — SIGNIFICANT CHANGE UP
CREAT SERPL-MCNC: 1.3 MG/DL — SIGNIFICANT CHANGE UP (ref 0.7–1.5)
CREAT SERPL-MCNC: 1.4 MG/DL — SIGNIFICANT CHANGE UP (ref 0.7–1.5)
EGFR: 53 ML/MIN/1.73M2 — LOW
EGFR: 58 ML/MIN/1.73M2 — LOW
EOSINOPHIL # BLD AUTO: 0 K/UL — SIGNIFICANT CHANGE UP (ref 0–0.7)
EOSINOPHIL # BLD AUTO: 0.03 K/UL — SIGNIFICANT CHANGE UP (ref 0–0.7)
EOSINOPHIL NFR BLD AUTO: 0 % — SIGNIFICANT CHANGE UP (ref 0–8)
EOSINOPHIL NFR BLD AUTO: 0.3 % — SIGNIFICANT CHANGE UP (ref 0–8)
GLUCOSE SERPL-MCNC: 128 MG/DL — HIGH (ref 70–99)
GLUCOSE SERPL-MCNC: 163 MG/DL — HIGH (ref 70–99)
HCT VFR BLD CALC: 25.8 % — LOW (ref 42–52)
HCT VFR BLD CALC: 26.2 % — LOW (ref 42–52)
HGB BLD-MCNC: 8.5 G/DL — LOW (ref 14–18)
HGB BLD-MCNC: 8.8 G/DL — LOW (ref 14–18)
IMM GRANULOCYTES NFR BLD AUTO: 0.6 % — HIGH (ref 0.1–0.3)
IMM GRANULOCYTES NFR BLD AUTO: 0.6 % — HIGH (ref 0.1–0.3)
INR BLD: 2.14 RATIO — HIGH (ref 0.65–1.3)
LYMPHOCYTES # BLD AUTO: 0.61 K/UL — LOW (ref 1.2–3.4)
LYMPHOCYTES # BLD AUTO: 0.71 K/UL — LOW (ref 1.2–3.4)
LYMPHOCYTES # BLD AUTO: 6.6 % — LOW (ref 20.5–51.1)
LYMPHOCYTES # BLD AUTO: 6.9 % — LOW (ref 20.5–51.1)
MAGNESIUM SERPL-MCNC: 1.1 MG/DL — LOW (ref 1.8–2.4)
MAGNESIUM SERPL-MCNC: 2.3 MG/DL — SIGNIFICANT CHANGE UP (ref 1.8–2.4)
MCHC RBC-ENTMCNC: 30.1 PG — SIGNIFICANT CHANGE UP (ref 27–31)
MCHC RBC-ENTMCNC: 30.9 PG — SIGNIFICANT CHANGE UP (ref 27–31)
MCHC RBC-ENTMCNC: 32.9 G/DL — SIGNIFICANT CHANGE UP (ref 32–37)
MCHC RBC-ENTMCNC: 33.6 G/DL — SIGNIFICANT CHANGE UP (ref 32–37)
MCV RBC AUTO: 91.5 FL — SIGNIFICANT CHANGE UP (ref 80–94)
MCV RBC AUTO: 91.9 FL — SIGNIFICANT CHANGE UP (ref 80–94)
MONOCYTES # BLD AUTO: 0.33 K/UL — SIGNIFICANT CHANGE UP (ref 0.1–0.6)
MONOCYTES # BLD AUTO: 0.72 K/UL — HIGH (ref 0.1–0.6)
MONOCYTES NFR BLD AUTO: 3.6 % — SIGNIFICANT CHANGE UP (ref 1.7–9.3)
MONOCYTES NFR BLD AUTO: 7 % — SIGNIFICANT CHANGE UP (ref 1.7–9.3)
NEUTROPHILS # BLD AUTO: 8.25 K/UL — HIGH (ref 1.4–6.5)
NEUTROPHILS # BLD AUTO: 8.75 K/UL — HIGH (ref 1.4–6.5)
NEUTROPHILS NFR BLD AUTO: 85.4 % — HIGH (ref 42.2–75.2)
NEUTROPHILS NFR BLD AUTO: 88.8 % — HIGH (ref 42.2–75.2)
NRBC # BLD: 0 /100 WBCS — SIGNIFICANT CHANGE UP (ref 0–0)
NRBC # BLD: 0 /100 WBCS — SIGNIFICANT CHANGE UP (ref 0–0)
NRBC BLD-RTO: 0 /100 WBCS — SIGNIFICANT CHANGE UP (ref 0–0)
NRBC BLD-RTO: 0 /100 WBCS — SIGNIFICANT CHANGE UP (ref 0–0)
PLATELET # BLD AUTO: 202 K/UL — SIGNIFICANT CHANGE UP (ref 130–400)
PLATELET # BLD AUTO: 215 K/UL — SIGNIFICANT CHANGE UP (ref 130–400)
PMV BLD: 11.2 FL — HIGH (ref 7.4–10.4)
PMV BLD: 11.8 FL — HIGH (ref 7.4–10.4)
POTASSIUM SERPL-MCNC: 3.5 MMOL/L — SIGNIFICANT CHANGE UP (ref 3.5–5)
POTASSIUM SERPL-MCNC: 4 MMOL/L — SIGNIFICANT CHANGE UP (ref 3.5–5)
POTASSIUM SERPL-SCNC: 3.5 MMOL/L — SIGNIFICANT CHANGE UP (ref 3.5–5)
POTASSIUM SERPL-SCNC: 4 MMOL/L — SIGNIFICANT CHANGE UP (ref 3.5–5)
PROT SERPL-MCNC: 5.2 G/DL — LOW (ref 6–8)
PROT SERPL-MCNC: 5.5 G/DL — LOW (ref 6–8)
PROTHROM AB SERPL-ACNC: 25.7 SEC — HIGH (ref 9.95–12.87)
RBC # BLD: 2.82 M/UL — LOW (ref 4.7–6.1)
RBC # BLD: 2.85 M/UL — LOW (ref 4.7–6.1)
RBC # FLD: 12.7 % — SIGNIFICANT CHANGE UP (ref 11.5–14.5)
RBC # FLD: 12.8 % — SIGNIFICANT CHANGE UP (ref 11.5–14.5)
SODIUM SERPL-SCNC: 142 MMOL/L — SIGNIFICANT CHANGE UP (ref 135–146)
SODIUM SERPL-SCNC: 145 MMOL/L — SIGNIFICANT CHANGE UP (ref 135–146)
VANCOMYCIN FLD-MCNC: 10.8 UG/ML — HIGH (ref 5–10)
WBC # BLD: 10.25 K/UL — SIGNIFICANT CHANGE UP (ref 4.8–10.8)
WBC # BLD: 9.29 K/UL — SIGNIFICANT CHANGE UP (ref 4.8–10.8)
WBC # FLD AUTO: 10.25 K/UL — SIGNIFICANT CHANGE UP (ref 4.8–10.8)
WBC # FLD AUTO: 9.29 K/UL — SIGNIFICANT CHANGE UP (ref 4.8–10.8)

## 2025-02-07 PROCEDURE — 99221 1ST HOSP IP/OBS SF/LOW 40: CPT | Mod: FS,25

## 2025-02-07 PROCEDURE — 99291 CRITICAL CARE FIRST HOUR: CPT

## 2025-02-07 RX ORDER — CALCIUM GLUCONATE 20 MG/ML
2 INJECTION, SOLUTION INTRAVENOUS ONCE
Refills: 0 | Status: COMPLETED | OUTPATIENT
Start: 2025-02-07 | End: 2025-02-07

## 2025-02-07 RX ORDER — MAGNESIUM SULFATE 500 MG/ML
2 SYRINGE (ML) INJECTION
Refills: 0 | Status: COMPLETED | OUTPATIENT
Start: 2025-02-07 | End: 2025-02-07

## 2025-02-07 RX ORDER — MAGNESIUM OXIDE 400 MG
400 TABLET ORAL
Refills: 0 | Status: DISCONTINUED | OUTPATIENT
Start: 2025-02-07 | End: 2025-02-28

## 2025-02-07 RX ORDER — MIDODRINE HYDROCHLORIDE 5 MG/1
15 TABLET ORAL EVERY 8 HOURS
Refills: 0 | Status: DISCONTINUED | OUTPATIENT
Start: 2025-02-07 | End: 2025-02-08

## 2025-02-07 RX ORDER — SODIUM CHLORIDE 9 G/1000ML
1000 INJECTION, SOLUTION INTRAVENOUS
Refills: 0 | Status: DISCONTINUED | OUTPATIENT
Start: 2025-02-07 | End: 2025-02-09

## 2025-02-07 RX ORDER — COSYNTROPIN 0.25 MG/ML
0.25 INJECTION, SOLUTION INTRAVENOUS ONCE
Refills: 0 | Status: COMPLETED | OUTPATIENT
Start: 2025-02-07 | End: 2025-02-07

## 2025-02-07 RX ADMIN — Medication 25 GRAM(S): at 11:12

## 2025-02-07 RX ADMIN — COSYNTROPIN 0.25 MILLIGRAM(S): 0.25 INJECTION, SOLUTION INTRAVENOUS at 14:12

## 2025-02-07 RX ADMIN — Medication 400 MILLIGRAM(S): at 18:25

## 2025-02-07 RX ADMIN — Medication 25 GRAM(S): at 12:51

## 2025-02-07 RX ADMIN — CALCIUM GLUCONATE 200 GRAM(S): 20 INJECTION, SOLUTION INTRAVENOUS at 12:53

## 2025-02-07 RX ADMIN — SODIUM CHLORIDE 100 MILLILITER(S): 9 INJECTION, SOLUTION INTRAVENOUS at 21:13

## 2025-02-07 RX ADMIN — Medication 667 MILLIGRAM(S): at 09:17

## 2025-02-07 RX ADMIN — Medication 40 MILLIGRAM(S): at 06:13

## 2025-02-07 RX ADMIN — LIDOCAINE HYDROCHLORIDE 1 APPLICATION(S): 20 JELLY TOPICAL at 00:30

## 2025-02-07 RX ADMIN — Medication 667 MILLIGRAM(S): at 18:25

## 2025-02-07 RX ADMIN — Medication 1 APPLICATION(S): at 12:52

## 2025-02-07 RX ADMIN — Medication 650 MILLIGRAM(S): at 06:12

## 2025-02-07 RX ADMIN — LIDOCAINE HYDROCHLORIDE 1 APPLICATION(S): 20 JELLY TOPICAL at 06:13

## 2025-02-07 RX ADMIN — MIDODRINE HYDROCHLORIDE 15 MILLIGRAM(S): 5 TABLET ORAL at 21:12

## 2025-02-07 RX ADMIN — MIDODRINE HYDROCHLORIDE 15 MILLIGRAM(S): 5 TABLET ORAL at 13:07

## 2025-02-07 RX ADMIN — AMIODARONE HYDROCHLORIDE 200 MILLIGRAM(S): 50 INJECTION, SOLUTION INTRAVENOUS at 06:13

## 2025-02-07 RX ADMIN — DEXAMETHASONE 6 MILLIGRAM(S): 0.5 TABLET ORAL at 00:36

## 2025-02-07 RX ADMIN — Medication 400 MILLIGRAM(S): at 12:50

## 2025-02-07 RX ADMIN — Medication 1.97 MICROGRAM(S)/KG/MIN: at 19:01

## 2025-02-07 RX ADMIN — SODIUM CHLORIDE 100 MILLILITER(S): 9 INJECTION, SOLUTION INTRAVENOUS at 13:08

## 2025-02-07 RX ADMIN — Medication 3.5 MILLIGRAM(S): at 21:13

## 2025-02-07 RX ADMIN — Medication 667 MILLIGRAM(S): at 12:50

## 2025-02-07 RX ADMIN — ROSUVASTATIN CALCIUM 40 MILLIGRAM(S): 20 TABLET, FILM COATED ORAL at 21:12

## 2025-02-07 RX ADMIN — AMIODARONE HYDROCHLORIDE 200 MILLIGRAM(S): 50 INJECTION, SOLUTION INTRAVENOUS at 18:25

## 2025-02-07 RX ADMIN — EZETIMIBE 10 MILLIGRAM(S): 10 TABLET ORAL at 12:50

## 2025-02-07 RX ADMIN — Medication 25 GRAM(S): at 09:17

## 2025-02-07 RX ADMIN — MIDODRINE HYDROCHLORIDE 10 MILLIGRAM(S): 5 TABLET ORAL at 06:12

## 2025-02-07 NOTE — PROGRESS NOTE ADULT - SUBJECTIVE AND OBJECTIVE BOX
Patient is a 74y old  Male who presents with a chief complaint of Acute renal failure     (06 Feb 2025 16:16)        Over Night Events:    Vasopressor doses decreased significantly over past 24 hours  Inappropriately low AM cortisol level yesterday  Net positive 1.6L after IVF bolus yesterday  Hgb decreased by about 2, no clinical bleeding      ROS:     All ROS are negative except HPI         PHYSICAL EXAM    ICU Vital Signs Last 24 Hrs  T(C): 36.7 (07 Feb 2025 08:00), Max: 36.9 (06 Feb 2025 20:00)  T(F): 98.1 (07 Feb 2025 08:00), Max: 98.5 (07 Feb 2025 00:00)  HR: 91 (07 Feb 2025 09:00) (64 - 91)  BP: --  BP(mean): --  ABP: 100/53 (07 Feb 2025 09:00) (81/51 - 125/60)  ABP(mean): 70 (07 Feb 2025 09:00) (61 - 84)  RR: 21 (07 Feb 2025 09:00) (18 - 44)  SpO2: 95% (07 Feb 2025 09:00) (93% - 97%)    O2 Parameters below as of 07 Feb 2025 08:00  Patient On (Oxygen Delivery Method): room air            Constitutional: no acute distress, well nourished well developed  Neuro: moving all 4 limbs spontaneously, no facial droop or dysarthria  HEENT: NCAT, anicteric  Neck: no visible lymphadenopathy or goiter  Pulm: no respiratory distress. clear to auscultation bilaterally  Cardiac: extremities appear pink and well-perfused.  regular rhythm and rate, no murmur detected  Abdomen: non-distended  Extremities: no peripheral edema      02-06-25 @ 07:01  -  02-07-25 @ 07:00  --------------------------------------------------------  IN:    Amiodarone: 116.9 mL    IV PiggyBack: 300 mL    Lactated Ringers: 2000 mL    Lactated Ringers Bolus: 1500 mL    Oral Fluid: 960 mL    Phenylephrine: 437.3 mL    Sodium Bicarbonate: 200 mL    Vasopressin: 126 mL  Total IN: 5640.2 mL    OUT:    Indwelling Catheter - Urethral (mL): 3975 mL  Total OUT: 3975 mL    Total NET: 1665.2 mL      02-07-25 @ 07:01  -  02-07-25 @ 09:27  --------------------------------------------------------  IN:    Vasopressin: 12 mL  Total IN: 12 mL    OUT:    Indwelling Catheter - Urethral (mL): 75 mL  Total OUT: 75 mL    Total NET: -63 mL          LABS:                            8.5    9.29  )-----------( 202      ( 07 Feb 2025 06:30 )             25.8                                               02-07    142  |  107  |  35[H]  ----------------------------<  163[H]  4.0   |  24  |  1.4    Ca    7.2[L]      07 Feb 2025 06:30  Phos  3.2     02-06  Mg     1.1     02-07    TPro  5.2[L]  /  Alb  3.4[L]  /  TBili  0.8  /  DBili  x   /  AST  35  /  ALT  38  /  AlkPhos  41  02-07      PT/INR - ( 07 Feb 2025 06:30 )   PT: 25.70 sec;   INR: 2.14 ratio         PTT - ( 06 Feb 2025 04:50 )  PTT:47.6 sec                                       Urinalysis Basic - ( 07 Feb 2025 06:30 )    Color: x / Appearance: x / SG: x / pH: x  Gluc: 163 mg/dL / Ketone: x  / Bili: x / Urobili: x   Blood: x / Protein: x / Nitrite: x   Leuk Esterase: x / RBC: x / WBC x   Sq Epi: x / Non Sq Epi: x / Bacteria: x                                                  LIVER FUNCTIONS - ( 07 Feb 2025 06:30 )  Alb: 3.4 g/dL / Pro: 5.2 g/dL / ALK PHOS: 41 U/L / ALT: 38 U/L / AST: 35 U/L / GGT: x                                                  Culture - Blood (collected 05 Feb 2025 06:10)  Source: .Blood BLOOD  Preliminary Report (06 Feb 2025 14:01):    No growth at 24 hours    Culture - Blood (collected 05 Feb 2025 06:10)  Source: .Blood BLOOD  Preliminary Report (06 Feb 2025 14:01):    No growth at 24 hours    Urinalysis with Rflx Culture (collected 04 Feb 2025 14:14)                                                                                       ABG - ( 06 Feb 2025 04:07 )  pH, Arterial: 7.40  pH, Blood: x     /  pCO2: 38    /  pO2: 69    / HCO3: 24    / Base Excess: -1.1  /  SaO2: 95.7                MEDICATIONS  (STANDING):  aMIOdarone    Tablet 200 milliGRAM(s) Oral two times a day  aMIOdarone Infusion 0.501 mG/Min (16.7 mL/Hr) IV Continuous <Continuous>  calcium acetate 667 milliGRAM(s) Oral three times a day with meals  calcium gluconate IVPB 2 Gram(s) IV Intermittent once  cefTRIAXone   IVPB 2000 milliGRAM(s) IV Intermittent every 24 hours  chlorhexidine 2% Cloths 1 Application(s) Topical daily  dexAMETHasone  Injectable 6 milliGRAM(s) IV Push every 24 hours  ezetimibe 10 milliGRAM(s) Oral daily  lactated ringers. 1000 milliLiter(s) (100 mL/Hr) IV Continuous <Continuous>  lidocaine 5% Ointment 1 Application(s) Topical four times a day  magnesium sulfate  IVPB 2 Gram(s) IV Intermittent every 2 hours  midodrine 10 milliGRAM(s) Oral every 8 hours  pantoprazole    Tablet 40 milliGRAM(s) Oral before breakfast  phenylephrine    Infusion 0.1 MICROgram(s)/kG/Min (1.97 mL/Hr) IV Continuous <Continuous>  rosuvastatin 40 milliGRAM(s) Oral at bedtime  sodium bicarbonate 650 milliGRAM(s) Oral every 8 hours  vasopressin Infusion 0.04 Unit(s)/Min (6 mL/Hr) IV Continuous <Continuous>    MEDICATIONS  (PRN):

## 2025-02-07 NOTE — PROGRESS NOTE ADULT - SUBJECTIVE AND OBJECTIVE BOX
SUBJECTIVE/OVERNIGHT EVENTS  Today is hospital day 3d. This morning patient was seen and examined at bedside, resting comfortably in bed. No acute or major events overnight.    MEDICATIONS  STANDING MEDICATIONS  aMIOdarone    Tablet 200 milliGRAM(s) Oral two times a day  aMIOdarone Infusion 0.501 mG/Min IV Continuous <Continuous>  calcium acetate 667 milliGRAM(s) Oral three times a day with meals  calcium gluconate IVPB 2 Gram(s) IV Intermittent once  cefTRIAXone   IVPB 2000 milliGRAM(s) IV Intermittent every 24 hours  chlorhexidine 2% Cloths 1 Application(s) Topical daily  dexAMETHasone  Injectable 6 milliGRAM(s) IV Push every 24 hours  ezetimibe 10 milliGRAM(s) Oral daily  lactated ringers. 1000 milliLiter(s) IV Continuous <Continuous>  lidocaine 5% Ointment 1 Application(s) Topical four times a day  magnesium sulfate  IVPB 2 Gram(s) IV Intermittent every 2 hours  midodrine 10 milliGRAM(s) Oral every 8 hours  pantoprazole    Tablet 40 milliGRAM(s) Oral before breakfast  phenylephrine    Infusion 0.1 MICROgram(s)/kG/Min IV Continuous <Continuous>  rosuvastatin 40 milliGRAM(s) Oral at bedtime  sodium bicarbonate 650 milliGRAM(s) Oral every 8 hours  vasopressin Infusion 0.04 Unit(s)/Min IV Continuous <Continuous>    PRN MEDICATIONS    VITALS  T(F): 98.1 (02-07-25 @ 08:00), Max: 98.5 (02-07-25 @ 00:00)  HR: 88 (02-07-25 @ 08:00) (64 - 90)  BP: --  RR: 19 (02-07-25 @ 08:00) (18 - 44)  SpO2: 96% (02-07-25 @ 08:00) (93% - 97%)    PHYSICAL EXAM  CCO*3  GBAE, clear lungs  Regular S1S2, no murmurs  Soft abdomen, non tender  No LLE    ( x ) Indwelling Art Catheter   Date insterted:    Reason (  ) Critical illness     (  ) urinary retention    ( x ) Accurate Ins/Outs Monitoring     (  ) CMO patient    ( x ) Central Line  Date inserted: 2/5  Location: (  ) Right IJ   ( x ) Left IJ   (  ) Right Fem   (  ) Left Fem      LABS             8.5    9.29  )-----------( 202      ( 02-07-25 @ 06:30 )             25.8     142  |  107  |  35  -------------------------<  163   02-07-25 @ 06:30  4.0  |  24  |  1.4    Ca      7.2     02-07-25 @ 06:30  Phos   3.2     02-06-25 @ 04:50  Mg     1.1     02-07-25 @ 06:30    TPro  5.2  /  Alb  3.4  /  TBili  0.8  /  DBili  x   /  AST  35  /  ALT  38  /  AlkPhos  41  /  GGT  x     02-07-25 @ 06:30    PT/INR - ( 02-07-25 @ 06:30 )   PT: 25.70 sec[H];   INR: 2.14 ratio[H]  PTT - ( 02-06-25 @ 04:50 )  PTT:47.6 sec    Troponin T, High Sensitivity Result: 33 ng/L (02-05-25 @ 00:40)  Troponin T, High Sensitivity Result: 33 ng/L (02-04-25 @ 23:35)  Troponin T, High Sensitivity Result: 34 ng/L (02-04-25 @ 20:00)    Urinalysis Basic - ( 07 Feb 2025 06:30 )    Color: x / Appearance: x / SG: x / pH: x  Gluc: 163 mg/dL / Ketone: x  / Bili: x / Urobili: x   Blood: x / Protein: x / Nitrite: x   Leuk Esterase: x / RBC: x / WBC x   Sq Epi: x / Non Sq Epi: x / Bacteria: x      ABG - ( 06 Feb 2025 04:07 )  pH, Arterial: 7.40  pH, Blood: x     /  pCO2: 38    /  pO2: 69    / HCO3: 24    / Base Excess: -1.1  /  SaO2: 95.7                Culture - Blood (collected 05 Feb 2025 06:10)  Source: .Blood BLOOD  Preliminary Report (06 Feb 2025 14:01):    No growth at 24 hours    Culture - Blood (collected 05 Feb 2025 06:10)  Source: .Blood BLOOD  Preliminary Report (06 Feb 2025 14:01):    No growth at 24 hours    Urinalysis with Rflx Culture (collected 04 Feb 2025 14:14)      IMAGING

## 2025-02-07 NOTE — CONSULT NOTE ADULT - ASSESSMENT
#Low cortisol  #Rule out adrenal insufficiency   - Cortisol level 4.6 @9:30AM 2/6/25  - presented w/ hypotension, hyperkalemia, no hypoglycemia   - received decadron prior to cortisol testing which can cause suppression   - continue with ACTH and Cosyntropin test, if positive will need stress dose steroids  - will follow

## 2025-02-07 NOTE — PROGRESS NOTE ADULT - CRITICAL CARE ATTENDING COMMENT
This patient is critically ill due to the following:  * Hemodynamic instability requiring titration of vasopressors or other vasoactive agents  * Multiple organ failure requiring complex decision-making, and there is a high probability of imminent or life-threatening deterioration in the patient’s condition  * The patient required frequent reassessments and monitoring to ensure response to interventions and therapies.    Critical care time includes time spent evaluating and treating the patient's acute illness as well as time spent reviewing labs, radiology,  and discussing the case with a multidisciplinary team in an effort to prevent further life threatening deterioration or end organ damage. This time is independent of any procedures performed.
I have personally seen and examined this patient.    I have reviewed all pertinent clinical information and reviewed all relevant imaging and diagnostic studies personally.   I counseled the patient about diagnostic testing and treatment plan. All questions were answered.   I discussed recommendations with the primary team.
This patient is critically ill due to the following:  * Hemodynamic instability requiring titration of vasopressors or other vasoactive agents  * Multiple organ failure requiring complex decision-making, and there is a high probability of imminent or life-threatening deterioration in the patient’s condition  * The patient required frequent reassessments and monitoring to ensure response to interventions and therapies.    Critical care time includes time spent evaluating and treating the patient's acute illness as well as time spent reviewing labs, radiology,  and discussing the case with a multidisciplinary team in an effort to prevent further life threatening deterioration or end organ damage. This time is independent of any procedures performed.
This patient is critically ill due to the following:  * Hemodynamic instability requiring titration of vasopressors or other vasoactive agents  * Multiple organ failure requiring complex decision-making, and there is a high probability of imminent or life-threatening deterioration in the patient’s condition  * The patient required frequent reassessments and monitoring to ensure response to interventions and therapies.    Critical care time includes time spent evaluating and treating the patient's acute illness as well as time spent reviewing labs, radiology,  and discussing the case with a multidisciplinary team in an effort to prevent further life threatening deterioration or end organ damage. This time is independent of any procedures performed.

## 2025-02-07 NOTE — PROGRESS NOTE ADULT - SUBJECTIVE AND OBJECTIVE BOX
ALLIE TORRE  74y, Male  Allergy: No Known Allergies      LOS  3d    CHIEF COMPLAINT: Acute renal failure     (06 Feb 2025 16:16)      INTERVAL EVENTS/HPI  - No acute events overnight  - T(F): , Max: 98.5 (02-07-25 @ 00:00)  - on vaso/phenylephrine - no new symptoms   - WBC Count: 9.29 (02-07-25 @ 06:30)  WBC Count: 8.35 (02-06-25 @ 04:50)     - Creatinine: 1.4 (02-07-25 @ 06:30)  Creatinine: 1.9 (02-06-25 @ 04:50)       ROS  General: Denies rigors, nightsweats  HEENT: Denies headache, rhinorrhea, sore throat, eye pain  CV: Denies CP, palpitations  PULM: Denies wheezing, hemoptysis  GI: Denies hematemesis, hematochezia, melena  : Denies discharge, hematuria  MSK: Denies arthralgias, myalgias  SKIN: Denies rash, lesions  NEURO: Denies paresthesias, weakness  PSYCH: Denies depression, anxiety    VITALS:  T(F): 98.1, Max: 98.5 (02-07-25 @ 00:00)  HR: 88  BP: --  RR: 19Vital Signs Last 24 Hrs  T(C): 36.7 (07 Feb 2025 08:00), Max: 36.9 (06 Feb 2025 20:00)  T(F): 98.1 (07 Feb 2025 08:00), Max: 98.5 (07 Feb 2025 00:00)  HR: 88 (07 Feb 2025 08:00) (64 - 90)  BP: --  BP(mean): --  RR: 19 (07 Feb 2025 08:00) (18 - 44)  SpO2: 96% (07 Feb 2025 08:00) (93% - 97%)    Parameters below as of 07 Feb 2025 08:00  Patient On (Oxygen Delivery Method): room air        PHYSICAL EXAM:  Gen: NAD, resting in bed  HEENT: Normocephalic, atraumatic  Neck: supple, no lymphadenopathy  CV: Regular rate & regular rhythm  Lungs: decreased BS at bases, no fremitus  Abdomen: Soft, BS present  Ext: Warm, well perfused  Neuro: non focal, awake  Skin: no rash, no erythema  Lines: no phlebitis    FH: Non-contributory  Social Hx: Non-contributory    TESTS & MEASUREMENTS:                        8.5    9.29  )-----------( 202      ( 07 Feb 2025 06:30 )             25.8     02-07    142  |  107  |  35[H]  ----------------------------<  163[H]  4.0   |  24  |  1.4    Ca    7.2[L]      07 Feb 2025 06:30  Phos  3.2     02-06  Mg     1.1     02-07    TPro  5.2[L]  /  Alb  3.4[L]  /  TBili  0.8  /  DBili  x   /  AST  35  /  ALT  38  /  AlkPhos  41  02-07      LIVER FUNCTIONS - ( 07 Feb 2025 06:30 )  Alb: 3.4 g/dL / Pro: 5.2 g/dL / ALK PHOS: 41 U/L / ALT: 38 U/L / AST: 35 U/L / GGT: x           Urinalysis Basic - ( 07 Feb 2025 06:30 )    Color: x / Appearance: x / SG: x / pH: x  Gluc: 163 mg/dL / Ketone: x  / Bili: x / Urobili: x   Blood: x / Protein: x / Nitrite: x   Leuk Esterase: x / RBC: x / WBC x   Sq Epi: x / Non Sq Epi: x / Bacteria: x        Culture - Blood (collected 02-05-25 @ 06:10)  Source: .Blood BLOOD  Preliminary Report (02-06-25 @ 14:01):    No growth at 24 hours    Culture - Blood (collected 02-05-25 @ 06:10)  Source: .Blood BLOOD  Preliminary Report (02-06-25 @ 14:01):    No growth at 24 hours    Urinalysis with Rflx Culture (collected 02-04-25 @ 14:14)        Lactate, Blood: 1.2 mmol/L (02-05-25 @ 06:20)  Lactate, Blood: 1.5 mmol/L (02-04-25 @ 23:35)  Blood Gas Venous - Lactate: 1.7 mmol/L (02-04-25 @ 12:57)  Lactate, Blood: 1.6 mmol/L (02-04-25 @ 10:50)      INFECTIOUS DISEASES TESTING  Procalcitonin: 0.23 (02-06-25 @ 04:50)  MRSA PCR Result.: Negative (02-05-25 @ 12:55)  Procalcitonin: 0.42 (02-04-25 @ 20:00)      INFLAMMATORY MARKERS      RADIOLOGY & ADDITIONAL TESTS:  I have personally reviewed the last available Chest xray  CXR  Xray Chest 1 View- PORTABLE-Urgent:   ACC: [id number] EXAM:  XR CHEST PORTABLE URGENT 1V   ORDERED BY: SOURAV BAJWA     PROCEDURE DATE:  02/05/2025          INTERPRETATION:  CLINICAL HISTORY / REASON FOR EXAM: Shortness of breath.    COMPARISON: Chest radiograph from February 5, 2025.    TECHNIQUE/POSITIONING: Satisfactory. Single image, AP chest radiograph.    FINDINGS:    SUPPORT DEVICES: Left IJ central venous catheter with distal tip   projecting superiorly into the SVC.    CARDIAC/MEDIASTINUM/HILUM: Post sternotomy.    LUNG PARENCHYMA/PLEURA: No focal consolidation or pleural effusion. No   pneumothorax.    SKELETON/SOFT TISSUES: Unchanged.      IMPRESSION:    Interval placement of internal jugular central venous catheter. The   catheter distal tip projects cranially to theSVC.    --- End of Report ---            SCOOTER CASTILLO MD; Attending Radiologist  This document has been electronically signed. Feb 5 2025  6:15PM (02-05-25 @ 15:44)      CT  CT Abdomen and Pelvis No Cont:   ACC: [id number] EXAM:  CT ABDOMEN AND PELVIS   ORDERED BY: AYUSH HERNANDEZ     PROCEDURE DATE:  02/04/2025          INTERPRETATION:  CLINICAL INFORMATION: Worsening renal failure. Weight   loss. Fatigue.    COMPARISON: CT ABDOMEN/PELVIS 5/26/2016.    CONTRAST/COMPLICATIONS:  IV Contrast: None.  Oral Contrast: NONE  .    PROCEDURE:  CT of the Abdomen and Pelvis was performed.  Sagittal and coronal reformats were performed.    FINDINGS:  LOWER CHEST: Bibasilar atelectasis. Sternotomy wires. Coronary stents.    LIVER: 2 right lobe hepatic cysts.  BILE DUCTS: Normal caliber.  GALLBLADDER: Cholecystectomy.  SPLEEN: Within normal limits.  PANCREAS: Within normal limits.  ADRENALS: Within normal limits.  KIDNEYS/URETERS: Multiple nonobstructing leftrenal calculi, largest   measures 4 x 3 x 3 mm (623 Hounsfield units). Bilateral renal cysts.   Trace bilateral perinephric fat stranding, nonspecific. No hydronephrosis   bilaterally.    BLADDER: Multiple bladder diverticula seen bilaterally.  REPRODUCTIVE ORGANS: Unremarkable at CT.    BOWEL: No bowel obstruction. Colonic diverticulosis. Appendix is normal   (4-87). Sigmoid anastomosis noted. Large hiatal hernia.  PERITONEUM/RETROPERITONEUM: Within normal limits.  VESSELS: Atherosclerosis.  LYMPH NODES: No lymphadenopathy.  ABDOMINAL WALL: Multiple ventral wall hernias containing fat and   nonobstructed bowel. Bilateral prominent fat-containing inguinal hernias.  BONES: Degenerative changes.    IMPRESSION:  No acute abnormality in the abdomen and pelvis. No hydronephrosis.    Left-sided nephrolithiasis.    --- End of Report ---          JORGE ALFONSO MD; Resident Radiologist  This document has been electronically signed.  SEDRICK HUIZAR MD; Attending Radiologist  This document has been electronically signed. Feb 4 2025  3:08PM (02-04-25 @ 13:36)      CARDIOLOGY TESTING  12 Lead ECG:   Ventricular Rate 81 BPM    QRS Duration 100 ms    Q-T Interval 400 ms    QTC Calculation(Bazett) 464 ms    R Axis 66 degrees    T Axis 39 degrees    Diagnosis Line Atrial fibrillation with premature ventricular or aberrantly conducted  complexes  Abnormal ECG    Confirmed by Ken Ashton ([id number]) on 2/5/2025 5:02:24 PM (02-04-25 @ 11:03)      MEDICATIONS  aMIOdarone    Tablet 200 Oral two times a day  aMIOdarone Infusion 0.501 IV Continuous <Continuous>  calcium acetate 667 Oral three times a day with meals  calcium gluconate IVPB 2 IV Intermittent once  cefTRIAXone   IVPB 2000 IV Intermittent every 24 hours  chlorhexidine 2% Cloths 1 Topical daily  dexAMETHasone  Injectable 6 IV Push every 24 hours  ezetimibe 10 Oral daily  lactated ringers. 1000 IV Continuous <Continuous>  lidocaine 5% Ointment 1 Topical four times a day  magnesium sulfate  IVPB 2 IV Intermittent every 2 hours  midodrine 10 Oral every 8 hours  pantoprazole    Tablet 40 Oral before breakfast  phenylephrine    Infusion 0.1 IV Continuous <Continuous>  rosuvastatin 40 Oral at bedtime  sodium bicarbonate 650 Oral every 8 hours  vasopressin Infusion 0.04 IV Continuous <Continuous>      WEIGHT  Weight (kg): 105.2 (02-04-25 @ 16:30)  Creatinine: 1.4 mg/dL (02-07-25 @ 06:30)      ANTIBIOTICS:  cefTRIAXone   IVPB 2000 milliGRAM(s) IV Intermittent every 24 hours      All available historical records have been reviewed

## 2025-02-07 NOTE — PROGRESS NOTE ADULT - ASSESSMENT
Patient is a 74-year-old male with past medical history of hypertension, dyslipidemia, mitral valve regurg, A-fib on Coumadin,HFrEF with an EF of 45% in december , and status post AICD presenting for weakness. Patient endorses persistent, complaining generalized weakness, fatigue, anorexia and poor p.o. intake for the past 5-6 weeks. Patient also reports a significant weight loss. Patient  states he had recent blood work which showed worsening kidney function, creatinine was 3.1 in  january up from a baseline of 1,2 in december and currently creatinine in 5.3.   Patient also hypotensive on pressors now, suspicion of AI, had rapid afib started on amio.    #Shock, unclear source  #Suspicion of AI  -Pt was hypotensive, no fever, no leukocytosis  -Hb stable, so signs of bleeding  -TTE: EF 45-50% with moderate TR only  -UA negative  -CXR normal  -CTAP negative  -RVP negative, MRSA negative  -Blood cx negative  -Received fluids, not responsive, started on levophed  -After having rapid rate Afib on 2/5, switched to phenylephrine and vasopressin was added, today still on vaso 0.04 and balbir low dose (improved requirements)  -Cx taken, started empirically on cefepime, then ora, now ID cs recommending ceftriaxone with re-dosing of vanco till blood cx results  -F/u vanco level to re-dose  -F/u urine cx  -TSH low 0.22, but free T4 normal so likely sick euthyroid syndrome  -AM cortisol intermediate at 4.8 (inappropriately low) so ordered ACTH level, and will do a low dose ACTH stim test today  -Started on dexa 6 mg, now held for the stim test    #Afib, was in RVR, now rate controlled  -Amio loaded and started on amio drip on 2/5, switched to PO amio   -Received 2 doses of metoprolol 5mg IV each  -Now rate controlled  -On metoprolol 200mg at home, holding now in the setting of hypotension  -EP consulted  -On coumadin at home 5 mg daily (since it's a valvular Afib), holding as INR supra-therapeutic (10.4 -> 7 ->4), today INR 2.14  -Will check with pharmacy to resume coumadin    #MARIANNE  #HAGMA  -Crea trended up as per patient from 1.2 baseline, to 3.1 in Jan, and now 5.3 on admission  -Now trending down, today 1.4 (around baseline)  -Holding home aldactone, ACEi, entresto and farxiga  -Not euglycemic DKA as BHB negative  -Art inserted for accurate I/O  -On  cc/h as per nephro  -PO HCO3 started  -Midodrine 10mg q8  -KBUS negative  -MM w/u follow up    #HFimpEF  -TTE showed EF 45-50%, mod TR, s/p mitral annuloplasty  -Holding all his GDMT now as pt in shock, would resume progressively when out of shock    #Diet: DASH  #DVT pro: Will resume coumadin as INR within range  #GI pro: pantoprazole  #Activity: as tolerated  #Dispo: ICU  #Code status: Full code as per patient     Patient is a 74-year-old male with past medical history of hypertension, dyslipidemia, mitral valve regurg, A-fib on Coumadin,HFrEF with an EF of 45% in december , and status post AICD presenting for weakness. Patient endorses persistent, complaining generalized weakness, fatigue, anorexia and poor p.o. intake for the past 5-6 weeks. Patient also reports a significant weight loss. Patient  states he had recent blood work which showed worsening kidney function, creatinine was 3.1 in  january up from a baseline of 1,2 in december and currently creatinine in 5.3.   Patient also hypotensive on pressors now, suspicion of AI, had rapid afib started on amio.    #Shock, unclear source  #Suspicion of AI  -Pt was hypotensive, no fever, no leukocytosis  -Hb stable, so signs of bleeding  -TTE: EF 45-50% with moderate TR only  -UA negative  -CXR normal  -CTAP negative  -RVP negative, MRSA negative  -Blood cx negative  -Received fluids, not responsive, started on levophed  -After having rapid rate Afib on 2/5, switched to phenylephrine and vasopressin was added, today still on vaso 0.04 and balbir low dose (improved requirements), will stop vaso  -Cx taken, started empirically on cefepime, then ora, now ID cs recommending stopping all ABx as no source and cultures all negative  -TSH low 0.22, but free T4 normal so likely sick euthyroid syndrome  -AM cortisol intermediate at 4.8 (inappropriately low) so ordered ACTH level, and will do a low dose ACTH stim test today  -Started on dexa 6 mg, now held for the stim test, will resume stress dose steroids after stim test  -Endo consulted    #Afib, was in RVR, now rate controlled  -Amio loaded and started on amio drip on 2/5, switched to PO amio   -Received 2 doses of metoprolol 5mg IV each  -Now rate controlled  -On metoprolol 200mg at home, holding now in the setting of hypotension  -EP consulted  -On coumadin at home 5 mg daily (since it's a valvular Afib), holding as INR supra-therapeutic (10.4 -> 7 ->4), today INR 2.14  -Ordered coumadin 5 mg today, check INR daily    #MARIANNE  #HAGMA  -Crea trended up as per patient from 1.2 baseline, to 3.1 in Jan, and now 5.3 on admission  -Now trending down, today 1.4 (around baseline)  -Holding home aldactone, ACEi, entresto and farxiga  -Not euglycemic DKA as BHB negative  -Art inserted for accurate I/O  -On  cc/h as per nephro  -PO HCO3 started  -Midodrine 10mg q8  -KBUS negative  -MM w/u follow up    #HFimpEF  -TTE showed EF 45-50%, mod TR, s/p mitral annuloplasty  -Holding all his GDMT now as pt in shock, would resume progressively when out of shock    #Diet: DASH  #DVT pro: Will resume coumadin as INR within range  #GI pro: pantoprazole  #Activity: as tolerated  #Dispo: ICU  #Code status: Full code as per patient

## 2025-02-07 NOTE — PROGRESS NOTE ADULT - SUBJECTIVE AND OBJECTIVE BOX
Nephrology progress note    Patient is seen and examined, events over the last 24 h noted .  Lying in bed comfortable     Allergies:  No Known Allergies    Hospital Medications:   MEDICATIONS  (STANDING):  aMIOdarone    Tablet 200 milliGRAM(s) Oral two times a day  calcium acetate 667 milliGRAM(s) Oral three times a day with meals  calcium gluconate IVPB 2 Gram(s) IV Intermittent once  dexAMETHasone  Injectable 6 milliGRAM(s) IV Push every 24 hours  ezetimibe 10 milliGRAM(s) Oral daily  lactated ringers. 1000 milliLiter(s) (100 mL/Hr) IV Continuous <Continuous>  lidocaine 5% Ointment 1 Application(s) Topical four times a day  magnesium sulfate  IVPB 2 Gram(s) IV Intermittent every 2 hours  midodrine 15 milliGRAM(s) Oral every 8 hours  pantoprazole    Tablet 40 milliGRAM(s) Oral before breakfast  phenylephrine    Infusion 0.1 MICROgram(s)/kG/Min (1.97 mL/Hr) IV Continuous <Continuous>  rosuvastatin 40 milliGRAM(s) Oral at bedtime  sodium bicarbonate 650 milliGRAM(s) Oral every 8 hours  warfarin 5 milliGRAM(s) Oral once        VITALS:  T(F): 98.1 (02-07-25 @ 08:00), Max: 98.5 (02-07-25 @ 00:00)  HR: 91 (02-07-25 @ 09:00)  BP: --  RR: 21 (02-07-25 @ 09:00)  SpO2: 95% (02-07-25 @ 09:00)      02-05 @ 07:01  -  02-06 @ 07:00  --------------------------------------------------------  IN: 5154.1 mL / OUT: 4569 mL / NET: 585.1 mL    02-06 @ 07:01  -  02-07 @ 07:00  --------------------------------------------------------  IN: 5640.2 mL / OUT: 3975 mL / NET: 1665.2 mL    02-07 @ 07:01  -  02-07 @ 10:40  --------------------------------------------------------  IN: 12 mL / OUT: 75 mL / NET: -63 mL          PHYSICAL EXAM:  Constitutional: NAD  Respiratory: CTAB,   Cardiovascular: S1, S2, RRR  Gastrointestinal: BS+, soft, NT/ND  Extremities: No cyanosis or clubbing. No peripheral edema  :  No kilgore.   Skin: No rashes    LABS:  02-07    142  |  107  |  35[H]  ----------------------------<  163[H]  4.0   |  24  |  1.4    Ca    7.2[L]      07 Feb 2025 06:30  Phos  3.2     02-06  Mg     1.1     02-07    TPro  5.2[L]  /  Alb  3.4[L]  /  TBili  0.8  /  DBili      /  AST  35  /  ALT  38  /  AlkPhos  41  02-07                          8.5    9.29  )-----------( 202      ( 07 Feb 2025 06:30 )             25.8       Urine Studies:  Urinalysis Basic - ( 07 Feb 2025 06:30 )    Color:  / Appearance:  / SG:  / pH:   Gluc: 163 mg/dL / Ketone:   / Bili:  / Urobili:    Blood:  / Protein:  / Nitrite:    Leuk Esterase:  / RBC:  / WBC    Sq Epi:  / Non Sq Epi:  / Bacteria:       Sodium, Random Urine: 59.0 mmoL/L (02-05 @ 08:43)  Creatinine, Random Urine: 46 mg/dL (02-05 @ 08:43)  Protein/Creatinine Ratio Calculation: 0.6 Ratio (02-05 @ 08:43)  Osmolality, Random Urine: 358 mos/kg (02-05 @ 08:43)  Potassium, Random Urine: 9 mmol/L (02-05 @ 08:43)      TSH 0.22      [02-06-25 @ 04:50]      Free Light Chains: kappa 4.40, lambda 3.92, ratio = 1.12      [02-05 @ 13:21]      RADIOLOGY & ADDITIONAL STUDIES:

## 2025-02-07 NOTE — PHARMACOTHERAPY INTERVENTION NOTE - COMMENTS
74yMale    Indication: afib  INR Goal: 2-3  Home Dose: 5 mg daily per Dr. Mejia  Bridge Therapy:    Current Medications:  aMIOdarone    Tablet 200 milliGRAM(s) Oral two times a day  calcium acetate 667 milliGRAM(s) Oral three times a day with meals  chlorhexidine 2% Cloths 1 Application(s) Topical daily  dexAMETHasone  Injectable 6 milliGRAM(s) IV Push every 24 hours  ezetimibe 10 milliGRAM(s) Oral daily  lactated ringers. 1000 milliLiter(s) IV Continuous <Continuous>  lidocaine 5% Ointment 1 Application(s) Topical four times a day  magnesium oxide 400 milliGRAM(s) Oral three times a day with meals  midodrine 15 milliGRAM(s) Oral every 8 hours  pantoprazole    Tablet 40 milliGRAM(s) Oral before breakfast  phenylephrine    Infusion 0.1 MICROgram(s)/kG/Min IV Continuous <Continuous>  rosuvastatin 40 milliGRAM(s) Oral at bedtime  warfarin 5 milliGRAM(s) Oral once      hemoglobin 8.8 g/dL (02-07-25 @ 11:50)    hematocrit 26.2 % (02-07-25 @ 11:50)    PLT: 215 K/uL (02-07-25 @ 11:50)    GFR:58 mL/min/1.73m2 (02-07-25 @ 15:36)      Drug Interactions:     INR trend  10.47 ratio (02-04-25 @ 23:35)  7.88 ratio (02-05-25 @ 06:20)  4.00 ratio (02-06-25 @ 04:50)  2.14 ratio (02-07-25 @ 06:30)      Warfarin administration history:        1. INR today is:               [   ] below goal ----- This is likely due to                                            [  x ] at goal                                            [   ] above goal ------ This is likely due to        2. Recommend Warfarin  dose between 2.5 to 3.5 mg PO x 1 since new on amiodarone. Lexicomp recommends an empiric dose reduction of 30-50%  3. Obtain INR tomorrow AM

## 2025-02-07 NOTE — PROGRESS NOTE ADULT - ASSESSMENT
ASSESSMENT  Patient is a 74-year-old male with past medical history of hypertension, dyslipidemia, mitral valve regurg, A-fib on Coumadin,HFrEF with an EF of 45% in december , and status post AICD presenting for weakness.    IMPRESSION  #Shock, undifferentiated   -  Xray Chest 1 View-PORTABLE IMMEDIATE (02.04.25 @ 11:42): Lung parenchyma/Pleura: No focal parenchymal opacities, pleural  effusions, or pneumothorax.  - CT Abdomen and Pelvis No Cont (02.04.25 @ 13:36): IMPRESSION: No acute abnormality in the abdomen and pelvis. No hydronephrosis. Left-sided nephrolithiasis.  - Flu/COVID/RSV negative  - MRSA nares negative  - WBC Count: 8.86 K/uL (02.04.25 @ 10:50) on admission  - TTE 2/4 - EF 45-50%  - Blood Cx 2/5 NG     #Low morning cotisol  - Cortisol AM, Serum: 4.8 ug/dL (02.06.25 @ 09:33)      #MARIANNE/Hyperkalemia  #Weight Loss  #Atrial Fibrillation on coumadin  #CHF s/p AICD    #Obesity BMI (kg/m2): 29  #Abx allergy: No Known Allergies      RECOMMENDATIONS  - blood cx are negative -- favor stopping vancomycin and ceftriaxone as no clear bacterial source   - planned for ACTH stimulation test  - monitor for fevers    Please call or message on [employer] Teams if with any questions.  Spectra [phone]

## 2025-02-07 NOTE — CONSULT NOTE ADULT - SUBJECTIVE AND OBJECTIVE BOX
HPI:    Patient is a 74-year-old male with past medical history of hypertension, dyslipidemia, mitral valve regurg, A-fib on Coumadin,HFrEF with an EF of 45% in december , and status post AICD presenting for weakness. Patient endorses persistent, complaining generalized weakness, fatigue, anorexia and poor p.o. intake for the past 5-6 weeks. Patient also reports a significant weight loss. Patient  states he had recent blood work which showed worsening kidney function, creatinine was 3.1 in  january up from a baseline of 1,2 in december and currently creatinine in 5.3.         · BP   65 mm Hg/45 mm Hg  · Heart Rate  86 /min  · Temp (C)  36.9 Degrees C oral   · SpO2 (%)  98 % on room air      (04 Feb 2025 13:48)        PAST MEDICAL & SURGICAL HISTORY      FAMILY HISTORY:  FAMILY HISTORY:      SOCIAL HISTORY:  []smoker  []Alcohol  []Drug    ALLERGIES:  No Known Allergies      MEDICATIONS:  MEDICATIONS  (STANDING):  aMIOdarone    Tablet 200 milliGRAM(s) Oral two times a day  calcium acetate 667 milliGRAM(s) Oral three times a day with meals  calcium gluconate IVPB 2 Gram(s) IV Intermittent once  chlorhexidine 2% Cloths 1 Application(s) Topical daily  dexAMETHasone  Injectable 6 milliGRAM(s) IV Push every 24 hours  ezetimibe 10 milliGRAM(s) Oral daily  lactated ringers. 1000 milliLiter(s) (100 mL/Hr) IV Continuous <Continuous>  lidocaine 5% Ointment 1 Application(s) Topical four times a day  magnesium sulfate  IVPB 2 Gram(s) IV Intermittent every 2 hours  midodrine 15 milliGRAM(s) Oral every 8 hours  pantoprazole    Tablet 40 milliGRAM(s) Oral before breakfast  phenylephrine    Infusion 0.1 MICROgram(s)/kG/Min (1.97 mL/Hr) IV Continuous <Continuous>  rosuvastatin 40 milliGRAM(s) Oral at bedtime  sodium bicarbonate 650 milliGRAM(s) Oral every 8 hours  warfarin 5 milliGRAM(s) Oral once    MEDICATIONS  (PRN):      HOME MEDICATIONS:  Home Medications:  Aldactone 25 mg oral tablet: 1 tab(s) orally once a day (at bedtime) (04 Feb 2025 15:09)  Entresto 97 mg-103 mg oral tablet: 1 tab(s) orally 2 times a day (04 Feb 2025 15:09)  ezetimibe 10 mg oral tablet: 1 tab(s) orally once a day (04 Feb 2025 15:09)  Farxiga 10 mg oral tablet: 1 tab(s) orally once a day (04 Feb 2025 15:09)  metoprolol succinate 100 mg oral tablet, extended release: 1 tab(s) orally once a day (in the afternoon) (04 Feb 2025 15:09)  metoprolol succinate 200 mg oral capsule, extended release: 1 cap(s) orally once a day (in the morning) (04 Feb 2025 15:10)  NexIUM 20 mg oral delayed release capsule: 1 cap(s) orally once a day (at bedtime) (04 Feb 2025 15:10)  rosuvastatin 40 mg oral tablet: 1 tab(s) orally once a day (04 Feb 2025 15:10)  warfarin 5 mg oral tablet: 1 tab(s) orally once a day (04 Feb 2025 15:10)      VITALS:   T(F): 98.1 (02-07 @ 08:00), Max: 99 (02-05 @ 16:00)  HR: 91 (02-07 @ 09:00) (64 - 186)  BP: 96/58 (02-05 @ 05:00) (65/45 - 106/50)  BP(mean): 66 (02-05 @ 05:00) (51 - 72)  RR: 21 (02-07 @ 09:00) (16 - 44)  SpO2: 95% (02-07 @ 09:00) (90% - 100%)    I&O's Summary    06 Feb 2025 07:01  -  07 Feb 2025 07:00  --------------------------------------------------------  IN: 5640.2 mL / OUT: 3975 mL / NET: 1665.2 mL    07 Feb 2025 07:01  -  07 Feb 2025 11:17  --------------------------------------------------------  IN: 12 mL / OUT: 75 mL / NET: -63 mL        REVIEW OF SYSTEMS:  CONSTITUTIONAL: No weakness, fevers or chills  EYES: No visual changes  ENT: No vertigo or throat pain   NECK: No pain or stiffness  RESPIRATORY: No cough, wheezing, hemoptysis; No shortness of breath  CARDIOVASCULAR: No chest pain or palpitations  GASTROINTESTINAL: No abdominal or epigastric pain. No nausea, vomiting, or hematemesis; No diarrhea or constipation. No melena or hematochezia.  GENITOURINARY: No Polyuria  NEUROLOGICAL:  No tremors, no Weakness or numbness  SKIN: No itching, no rashes  MSK: no joint pain    PHYSICAL EXAM:  GENERAL: Patient is awake , alert and oriented,  not in acute distress  EYES: No proptosis, no lid lag  NECK: No thyroid enlargement, no palpable nodules , no bruit  LUNGS: Clear to auscultation bilaterally   CARDIOVASCULAR: S1/S2 present, RRR , no murmurs or rubs  ABD: Soft, non-tender, non-distended, +BS  EXT: No REBEKAH  SKIN: No abdominal striae  NEURO: No tremors, DTR 2+    LABS:                        8.5    9.29  )-----------( 202      ( 07 Feb 2025 06:30 )             25.8     02-07    142  |  107  |  35[H]  ----------------------------<  163[H]  4.0   |  24  |  1.4    Ca    7.2[L]      07 Feb 2025 06:30  Phos  3.2     02-06  Mg     1.1     02-07    TPro  5.2[L]  /  Alb  3.4[L]  /  TBili  0.8  /  DBili  x   /  AST  35  /  ALT  38  /  AlkPhos  41  02-07    PT/INR - ( 07 Feb 2025 06:30 )   PT: 25.70 sec;   INR: 2.14 ratio         PTT - ( 06 Feb 2025 04:50 )  PTT:47.6 sec          POCT Blood Glucose.: 105 mg/dL (02-04-25 @ 15:49)    TSH 0.22; Total T3 --; Free T4 --   HPI:    Patient is a 74-year-old male with past medical history of hypertension, dyslipidemia, mitral valve regurg, A-fib on Coumadin,HFrEF with an EF of 45% in december , and status post AICD presenting for weakness. Patient endorses persistent, complaining generalized weakness, fatigue, anorexia and poor p.o. intake for the past 5-6 weeks. Patient also reports a significant weight loss. Patient  states he had recent blood work which showed worsening kidney function, creatinine was 3.1 in  january up from a baseline of 1,2 in december and currently creatinine in 5.3.         · BP   65 mm Hg/45 mm Hg  · Heart Rate  86 /min  · Temp (C)  36.9 Degrees C oral   · SpO2 (%)  98 % on room air      (04 Feb 2025 13:48)    Endocrinology consulted for low cortisol level and evaluation of adrenal insufficiency. Patient reports prior to hospitalization he was being evaluated for hyperkalemia from his Cardiologist. He also reports having decreased appetite and lethargy since the new year. His potassium levels increased on repeat op blood work and came to the hospital. He denies using steroids recently. Patient denies any other symptoms at this time. On arrival to the ED, patient was hypotensive, hyperkalemic with MARIANNE.         PAST MEDICAL & SURGICAL HISTORY      FAMILY HISTORY:  FAMILY HISTORY:      SOCIAL HISTORY:  []smoker  []Alcohol  []Drug    ALLERGIES:  No Known Allergies      MEDICATIONS:  MEDICATIONS  (STANDING):  aMIOdarone    Tablet 200 milliGRAM(s) Oral two times a day  calcium acetate 667 milliGRAM(s) Oral three times a day with meals  calcium gluconate IVPB 2 Gram(s) IV Intermittent once  chlorhexidine 2% Cloths 1 Application(s) Topical daily  dexAMETHasone  Injectable 6 milliGRAM(s) IV Push every 24 hours  ezetimibe 10 milliGRAM(s) Oral daily  lactated ringers. 1000 milliLiter(s) (100 mL/Hr) IV Continuous <Continuous>  lidocaine 5% Ointment 1 Application(s) Topical four times a day  magnesium sulfate  IVPB 2 Gram(s) IV Intermittent every 2 hours  midodrine 15 milliGRAM(s) Oral every 8 hours  pantoprazole    Tablet 40 milliGRAM(s) Oral before breakfast  phenylephrine    Infusion 0.1 MICROgram(s)/kG/Min (1.97 mL/Hr) IV Continuous <Continuous>  rosuvastatin 40 milliGRAM(s) Oral at bedtime  sodium bicarbonate 650 milliGRAM(s) Oral every 8 hours  warfarin 5 milliGRAM(s) Oral once    MEDICATIONS  (PRN):      HOME MEDICATIONS:  Home Medications:  Aldactone 25 mg oral tablet: 1 tab(s) orally once a day (at bedtime) (04 Feb 2025 15:09)  Entresto 97 mg-103 mg oral tablet: 1 tab(s) orally 2 times a day (04 Feb 2025 15:09)  ezetimibe 10 mg oral tablet: 1 tab(s) orally once a day (04 Feb 2025 15:09)  Farxiga 10 mg oral tablet: 1 tab(s) orally once a day (04 Feb 2025 15:09)  metoprolol succinate 100 mg oral tablet, extended release: 1 tab(s) orally once a day (in the afternoon) (04 Feb 2025 15:09)  metoprolol succinate 200 mg oral capsule, extended release: 1 cap(s) orally once a day (in the morning) (04 Feb 2025 15:10)  NexIUM 20 mg oral delayed release capsule: 1 cap(s) orally once a day (at bedtime) (04 Feb 2025 15:10)  rosuvastatin 40 mg oral tablet: 1 tab(s) orally once a day (04 Feb 2025 15:10)  warfarin 5 mg oral tablet: 1 tab(s) orally once a day (04 Feb 2025 15:10)      VITALS:   T(F): 98.1 (02-07 @ 08:00), Max: 99 (02-05 @ 16:00)  HR: 91 (02-07 @ 09:00) (64 - 186)  BP: 96/58 (02-05 @ 05:00) (65/45 - 106/50)  BP(mean): 66 (02-05 @ 05:00) (51 - 72)  RR: 21 (02-07 @ 09:00) (16 - 44)  SpO2: 95% (02-07 @ 09:00) (90% - 100%)    I&O's Summary    06 Feb 2025 07:01  -  07 Feb 2025 07:00  --------------------------------------------------------  IN: 5640.2 mL / OUT: 3975 mL / NET: 1665.2 mL    07 Feb 2025 07:01  -  07 Feb 2025 11:17  --------------------------------------------------------  IN: 12 mL / OUT: 75 mL / NET: -63 mL        REVIEW OF SYSTEMS:  CONSTITUTIONAL: No weakness, fevers or chills  EYES: No visual changes  ENT: No vertigo or throat pain   NECK: No pain or stiffness  RESPIRATORY: No cough, wheezing, hemoptysis; No shortness of breath  CARDIOVASCULAR: No chest pain or palpitations  GASTROINTESTINAL: No abdominal or epigastric pain. No nausea, vomiting, or hematemesis; No diarrhea or constipation. No melena or hematochezia.  GENITOURINARY: No Polyuria  NEUROLOGICAL:  No tremors, no Weakness or numbness  SKIN: No itching, no rashes  MSK: no joint pain    PHYSICAL EXAM:  GENERAL: Patient is awake , alert and oriented,  not in acute distress  EYES: No proptosis, no lid lag  NECK: No thyroid enlargement, no palpable nodules , no bruit  LUNGS: Clear to auscultation bilaterally   CARDIOVASCULAR: S1/S2 present, RRR , no murmurs or rubs  ABD: Soft, non-tender, non-distended, +BS  EXT: No REBEKAH  SKIN: No abdominal striae  NEURO: No tremors, DTR 2+    LABS:                        8.5    9.29  )-----------( 202      ( 07 Feb 2025 06:30 )             25.8     02-07    142  |  107  |  35[H]  ----------------------------<  163[H]  4.0   |  24  |  1.4    Ca    7.2[L]      07 Feb 2025 06:30  Phos  3.2     02-06  Mg     1.1     02-07    TPro  5.2[L]  /  Alb  3.4[L]  /  TBili  0.8  /  DBili  x   /  AST  35  /  ALT  38  /  AlkPhos  41  02-07    PT/INR - ( 07 Feb 2025 06:30 )   PT: 25.70 sec;   INR: 2.14 ratio         PTT - ( 06 Feb 2025 04:50 )  PTT:47.6 sec          POCT Blood Glucose.: 105 mg/dL (02-04-25 @ 15:49)    TSH 0.22; Total T3 --; Free T4 --   HPI:    Patient is a 74-year-old male with past medical history of hypertension, dyslipidemia, mitral valve regurg, A-fib on Coumadin,HFrEF with an EF of 45% in december , and status post AICD presenting for weakness. Patient endorses persistent, complaining generalized weakness, fatigue, anorexia and poor p.o. intake for the past 5-6 weeks. Patient also reports a significant weight loss. Patient  states he had recent blood work which showed worsening kidney function, creatinine was 3.1 in  january up from a baseline of 1,2 in december and currently creatinine in 5.3.         · BP   65 mm Hg/45 mm Hg  · Heart Rate  86 /min  · Temp (C)  36.9 Degrees C oral   · SpO2 (%)  98 % on room air      (04 Feb 2025 13:48)    Endocrinology consulted for low cortisol level and evaluation of adrenal insufficiency. Patient reports prior to hospitalization he was being evaluated for hyperkalemia from his Cardiologist. He also reports having decreased appetite and lethargy since the new year. His potassium levels increased on repeat op blood work and came to the hospital. He denies using steroids recently. Patient denies any other symptoms at this time. On arrival to the ED, patient was hypotensive, hyperkalemic with MARIANNE.         PAST MEDICAL & SURGICAL HISTORY      FAMILY HISTORY:  FAMILY HISTORY:      SOCIAL HISTORY:  []smoker  []Alcohol  []Drug    ALLERGIES:  No Known Allergies      MEDICATIONS:  MEDICATIONS  (STANDING):  aMIOdarone    Tablet 200 milliGRAM(s) Oral two times a day  calcium acetate 667 milliGRAM(s) Oral three times a day with meals  calcium gluconate IVPB 2 Gram(s) IV Intermittent once  chlorhexidine 2% Cloths 1 Application(s) Topical daily  dexAMETHasone  Injectable 6 milliGRAM(s) IV Push every 24 hours  ezetimibe 10 milliGRAM(s) Oral daily  lactated ringers. 1000 milliLiter(s) (100 mL/Hr) IV Continuous <Continuous>  lidocaine 5% Ointment 1 Application(s) Topical four times a day  magnesium sulfate  IVPB 2 Gram(s) IV Intermittent every 2 hours  midodrine 15 milliGRAM(s) Oral every 8 hours  pantoprazole    Tablet 40 milliGRAM(s) Oral before breakfast  phenylephrine    Infusion 0.1 MICROgram(s)/kG/Min (1.97 mL/Hr) IV Continuous <Continuous>  rosuvastatin 40 milliGRAM(s) Oral at bedtime  sodium bicarbonate 650 milliGRAM(s) Oral every 8 hours  warfarin 5 milliGRAM(s) Oral once    MEDICATIONS  (PRN):      HOME MEDICATIONS:  Home Medications:  Aldactone 25 mg oral tablet: 1 tab(s) orally once a day (at bedtime) (04 Feb 2025 15:09)  Entresto 97 mg-103 mg oral tablet: 1 tab(s) orally 2 times a day (04 Feb 2025 15:09)  ezetimibe 10 mg oral tablet: 1 tab(s) orally once a day (04 Feb 2025 15:09)  Farxiga 10 mg oral tablet: 1 tab(s) orally once a day (04 Feb 2025 15:09)  metoprolol succinate 100 mg oral tablet, extended release: 1 tab(s) orally once a day (in the afternoon) (04 Feb 2025 15:09)  metoprolol succinate 200 mg oral capsule, extended release: 1 cap(s) orally once a day (in the morning) (04 Feb 2025 15:10)  NexIUM 20 mg oral delayed release capsule: 1 cap(s) orally once a day (at bedtime) (04 Feb 2025 15:10)  rosuvastatin 40 mg oral tablet: 1 tab(s) orally once a day (04 Feb 2025 15:10)  warfarin 5 mg oral tablet: 1 tab(s) orally once a day (04 Feb 2025 15:10)      VITALS:   T(F): 98.1 (02-07 @ 08:00), Max: 99 (02-05 @ 16:00)  HR: 91 (02-07 @ 09:00) (64 - 186)  BP: 96/58 (02-05 @ 05:00) (65/45 - 106/50)  BP(mean): 66 (02-05 @ 05:00) (51 - 72)  RR: 21 (02-07 @ 09:00) (16 - 44)  SpO2: 95% (02-07 @ 09:00) (90% - 100%)    I&O's Summary    06 Feb 2025 07:01  -  07 Feb 2025 07:00  --------------------------------------------------------  IN: 5640.2 mL / OUT: 3975 mL / NET: 1665.2 mL    07 Feb 2025 07:01  -  07 Feb 2025 11:17  --------------------------------------------------------  IN: 12 mL / OUT: 75 mL / NET: -63 mL        REVIEW OF SYSTEMS:  CONSTITUTIONAL: No weakness, fevers or chills  NECK: No pain or stiffness  RESPIRATORY: No cough, wheezing, hemoptysis; No shortness of breath  CARDIOVASCULAR: No chest pain or palpitations  GASTROINTESTINAL: No abdominal or epigastric pain.  GENITOURINARY: No Polyuria  NEUROLOGICAL:  No tremors, no Weakness or numbness      PHYSICAL EXAM:  GENERAL: Patient is awake , alert and oriented,  not in acute distress  EYES: No proptosis, no lid lag  NECK: No thyroid enlargement  LUNGS: Clear to auscultation bilaterally   CARDIOVASCULAR: S1/S2 present, RRR , no murmurs or rubs  ABD: Soft, non-tender, non-distended, +BS  EXT: No REBEKAH      LABS:                        8.5    9.29  )-----------( 202      ( 07 Feb 2025 06:30 )             25.8     02-07    142  |  107  |  35[H]  ----------------------------<  163[H]  4.0   |  24  |  1.4    Ca    7.2[L]      07 Feb 2025 06:30  Phos  3.2     02-06  Mg     1.1     02-07    TPro  5.2[L]  /  Alb  3.4[L]  /  TBili  0.8  /  DBili  x   /  AST  35  /  ALT  38  /  AlkPhos  41  02-07    PT/INR - ( 07 Feb 2025 06:30 )   PT: 25.70 sec;   INR: 2.14 ratio         PTT - ( 06 Feb 2025 04:50 )  PTT:47.6 sec          POCT Blood Glucose.: 105 mg/dL (02-04-25 @ 15:49)    TSH 0.22; Total T3 --; Free T4 --

## 2025-02-07 NOTE — PROGRESS NOTE ADULT - ASSESSMENT
Patient is a 74-year-old male with past medical history of hypertension, dyslipidemia, mitral valve regurg, A-fib on Coumadin, cardiomyopathy, CHF, and status post AICD presenting for weakness. Renal called for MARIANNE   When asked denied dysuria no hematuria no abdominal pain no diarrhea no focal motor sensory deficit   on Farxiga and ACE   #MARIANNE  prerenal in nature   # Acidosis due to DKA due to MARIANNE too   # hyperkalemia mild   - Farxiga and entresto on hold   - cr trending down noted   - non oliguric   - no hydro on ct / stones   - LR at 100 cc per hour / no bicarbonate drip   - ph noted  at goal , dc phoslo  if level < 2.5/ hypocalcemia check vit D / PTH levels   - replete magnesium to 2 use Mg oxide 400 mg q8h   - pressors as needed  /  midodrine 10 q 8   - off lokelma   -  pr/ cr ratio minimal proteinuria   - h/h at goal  -  Dc sodium bicarb       will sign off recall PRN / call using TEAMS or on [phone]

## 2025-02-07 NOTE — PROGRESS NOTE ADULT - ASSESSMENT
ALLIE TORRE is a 74y man with a medical history significant for valvular AF on warfarin and CHF, who presented initially with subacute worsening of fatigue found to have renal failure on outpatient lab testing, and is now in the critical care unit for acute renal failure.       IMPRESSION:    Borderline adrenal insufficiency  MARIANNE, nonoliguric  AF-RVR  Hypotension   Metabolic acidosis likely 2/2 uremia   Chronic A fib on warfarin  HFmrEF  H/o HTN    PLAN:    CNS: Avoid depressants    HEENT: Oral care    PULMONARY: On RA. HOB @ 45 degrees.  Aspiration precautions.  CXR noted.      CARDIOVASCULAR:   Vasopressors: low-dose Phenylephrine, stop Vasopressin  Increase midodrine 15mg q8  TTE this admission without heart failure  Target MAP >65.    Consult EP for significant AF-RVR.  Recommendations appreciated  Amiodarone transitioned to PO  Start metoprolol once off vasopressors.    GI: GI ppx: PPI daily. Feeding. Bowel regimen PRN    RENAL: LR drip at 100/hour. Check UA, urine Na, urine Cr. Nephrology recommendations appreciated.  Follow up lytes.  Correct as needed.  replace Mg.  PO bicarb.    INFECTIOUS DISEASE: Follow up cultures.  Now off Abx given no evidence of infection.  ID consulted, appreciate recommendations    HEMATOLOGICAL:  DVT prophylaxis.  Monitor CBC.  Repeat CBC today, evaluate for possible bleed?  INR therapeutic today, monitor daily, resume warfarin soon, will have pharmacy evaluate.    ENDOCRINE:  Follow up FS.  Insulin protocol if needed. BHB negative.  Started dexamethasone, may have worsening BG.  AM cortisol borderline/low, will proceed with ACTH stim.  Consult endocrine.    MUSCULOSKELETAL: OOBTC    DISPO: MICU  CODE: FULL  LINES: L-IJ CVC, a-line, kilgore

## 2025-02-08 LAB
ACTH STIM ACTH BASELINE: 2.4 PG/ML — LOW (ref 7.2–63.3)
ACTH STIM CORTISOL 30 MIN: 21.3 UG/DL — SIGNIFICANT CHANGE UP
ACTH STIM CORTISOL 60 MIN: 22.5 UG/DL — SIGNIFICANT CHANGE UP
ALBUMIN SERPL ELPH-MCNC: 3.3 G/DL — LOW (ref 3.5–5.2)
ALP SERPL-CCNC: 45 U/L — SIGNIFICANT CHANGE UP (ref 30–115)
ALT FLD-CCNC: 48 U/L — HIGH (ref 0–41)
ANION GAP SERPL CALC-SCNC: 11 MMOL/L — SIGNIFICANT CHANGE UP (ref 7–14)
AST SERPL-CCNC: 47 U/L — HIGH (ref 0–41)
BASOPHILS # BLD AUTO: 0 K/UL — SIGNIFICANT CHANGE UP (ref 0–0.2)
BASOPHILS NFR BLD AUTO: 0 % — SIGNIFICANT CHANGE UP (ref 0–1)
BILIRUB SERPL-MCNC: 0.6 MG/DL — SIGNIFICANT CHANGE UP (ref 0.2–1.2)
BUN SERPL-MCNC: 31 MG/DL — HIGH (ref 10–20)
CALCIUM SERPL-MCNC: 7.3 MG/DL — LOW (ref 8.4–10.4)
CHLORIDE SERPL-SCNC: 105 MMOL/L — SIGNIFICANT CHANGE UP (ref 98–110)
CO2 SERPL-SCNC: 22 MMOL/L — SIGNIFICANT CHANGE UP (ref 17–32)
CREAT SERPL-MCNC: 1.3 MG/DL — SIGNIFICANT CHANGE UP (ref 0.7–1.5)
EGFR: 58 ML/MIN/1.73M2 — LOW
EOSINOPHIL # BLD AUTO: 0.01 K/UL — SIGNIFICANT CHANGE UP (ref 0–0.7)
EOSINOPHIL NFR BLD AUTO: 0.1 % — SIGNIFICANT CHANGE UP (ref 0–8)
GLUCOSE SERPL-MCNC: 127 MG/DL — HIGH (ref 70–99)
HCT VFR BLD CALC: 25.6 % — LOW (ref 42–52)
HGB BLD-MCNC: 8.3 G/DL — LOW (ref 14–18)
IMM GRANULOCYTES NFR BLD AUTO: 1.1 % — HIGH (ref 0.1–0.3)
INR BLD: 1.48 RATIO — HIGH (ref 0.65–1.3)
LYMPHOCYTES # BLD AUTO: 0.66 K/UL — LOW (ref 1.2–3.4)
LYMPHOCYTES # BLD AUTO: 7.3 % — LOW (ref 20.5–51.1)
MAGNESIUM SERPL-MCNC: 1.6 MG/DL — LOW (ref 1.8–2.4)
MCHC RBC-ENTMCNC: 30.3 PG — SIGNIFICANT CHANGE UP (ref 27–31)
MCHC RBC-ENTMCNC: 32.4 G/DL — SIGNIFICANT CHANGE UP (ref 32–37)
MCV RBC AUTO: 93.4 FL — SIGNIFICANT CHANGE UP (ref 80–94)
MONOCYTES # BLD AUTO: 0.19 K/UL — SIGNIFICANT CHANGE UP (ref 0.1–0.6)
MONOCYTES NFR BLD AUTO: 2.1 % — SIGNIFICANT CHANGE UP (ref 1.7–9.3)
NEUTROPHILS # BLD AUTO: 8.05 K/UL — HIGH (ref 1.4–6.5)
NEUTROPHILS NFR BLD AUTO: 89.4 % — HIGH (ref 42.2–75.2)
NRBC # BLD: 0 /100 WBCS — SIGNIFICANT CHANGE UP (ref 0–0)
NRBC BLD-RTO: 0 /100 WBCS — SIGNIFICANT CHANGE UP (ref 0–0)
PLATELET # BLD AUTO: 219 K/UL — SIGNIFICANT CHANGE UP (ref 130–400)
PMV BLD: 12.2 FL — HIGH (ref 7.4–10.4)
POTASSIUM SERPL-MCNC: 4 MMOL/L — SIGNIFICANT CHANGE UP (ref 3.5–5)
POTASSIUM SERPL-SCNC: 4 MMOL/L — SIGNIFICANT CHANGE UP (ref 3.5–5)
PROT SERPL-MCNC: 4.9 G/DL — LOW (ref 6–8)
PROTHROM AB SERPL-ACNC: 17.6 SEC — HIGH (ref 9.95–12.87)
RBC # BLD: 2.74 M/UL — LOW (ref 4.7–6.1)
RBC # FLD: 12.8 % — SIGNIFICANT CHANGE UP (ref 11.5–14.5)
SODIUM SERPL-SCNC: 138 MMOL/L — SIGNIFICANT CHANGE UP (ref 135–146)
WBC # BLD: 9.01 K/UL — SIGNIFICANT CHANGE UP (ref 4.8–10.8)
WBC # FLD AUTO: 9.01 K/UL — SIGNIFICANT CHANGE UP (ref 4.8–10.8)

## 2025-02-08 PROCEDURE — 99233 SBSQ HOSP IP/OBS HIGH 50: CPT | Mod: GC

## 2025-02-08 RX ORDER — MAGNESIUM SULFATE 500 MG/ML
2 SYRINGE (ML) INJECTION
Refills: 0 | Status: COMPLETED | OUTPATIENT
Start: 2025-02-08 | End: 2025-02-08

## 2025-02-08 RX ORDER — MIDODRINE HYDROCHLORIDE 5 MG/1
20 TABLET ORAL EVERY 8 HOURS
Refills: 0 | Status: DISCONTINUED | OUTPATIENT
Start: 2025-02-08 | End: 2025-02-11

## 2025-02-08 RX ORDER — MAGNESIUM SULFATE 500 MG/ML
1 SYRINGE (ML) INJECTION ONCE
Refills: 0 | Status: COMPLETED | OUTPATIENT
Start: 2025-02-08 | End: 2025-02-08

## 2025-02-08 RX ORDER — LIDOCAINE HYDROCHLORIDE 20 MG/ML
1 JELLY TOPICAL
Refills: 0 | Status: DISCONTINUED | OUTPATIENT
Start: 2025-02-08 | End: 2025-02-28

## 2025-02-08 RX ORDER — SODIUM CHLORIDE 9 G/1000ML
1000 INJECTION, SOLUTION INTRAVENOUS ONCE
Refills: 0 | Status: COMPLETED | OUTPATIENT
Start: 2025-02-08 | End: 2025-02-08

## 2025-02-08 RX ADMIN — Medication 667 MILLIGRAM(S): at 13:07

## 2025-02-08 RX ADMIN — AMIODARONE HYDROCHLORIDE 200 MILLIGRAM(S): 50 INJECTION, SOLUTION INTRAVENOUS at 17:33

## 2025-02-08 RX ADMIN — MIDODRINE HYDROCHLORIDE 20 MILLIGRAM(S): 5 TABLET ORAL at 21:07

## 2025-02-08 RX ADMIN — AMIODARONE HYDROCHLORIDE 200 MILLIGRAM(S): 50 INJECTION, SOLUTION INTRAVENOUS at 05:31

## 2025-02-08 RX ADMIN — Medication 100 GRAM(S): at 10:56

## 2025-02-08 RX ADMIN — Medication 25 GRAM(S): at 10:45

## 2025-02-08 RX ADMIN — Medication 3.5 MILLIGRAM(S): at 21:07

## 2025-02-08 RX ADMIN — MIDODRINE HYDROCHLORIDE 20 MILLIGRAM(S): 5 TABLET ORAL at 13:07

## 2025-02-08 RX ADMIN — Medication 667 MILLIGRAM(S): at 10:55

## 2025-02-08 RX ADMIN — MIDODRINE HYDROCHLORIDE 15 MILLIGRAM(S): 5 TABLET ORAL at 05:31

## 2025-02-08 RX ADMIN — Medication 400 MILLIGRAM(S): at 13:07

## 2025-02-08 RX ADMIN — Medication 40 MILLIGRAM(S): at 05:32

## 2025-02-08 RX ADMIN — DEXAMETHASONE 6 MILLIGRAM(S): 0.5 TABLET ORAL at 00:02

## 2025-02-08 RX ADMIN — Medication 25 GRAM(S): at 13:54

## 2025-02-08 RX ADMIN — ROSUVASTATIN CALCIUM 40 MILLIGRAM(S): 20 TABLET, FILM COATED ORAL at 21:07

## 2025-02-08 RX ADMIN — Medication 667 MILLIGRAM(S): at 17:34

## 2025-02-08 RX ADMIN — EZETIMIBE 10 MILLIGRAM(S): 10 TABLET ORAL at 13:07

## 2025-02-08 RX ADMIN — Medication 400 MILLIGRAM(S): at 10:56

## 2025-02-08 RX ADMIN — Medication 400 MILLIGRAM(S): at 17:33

## 2025-02-08 RX ADMIN — SODIUM CHLORIDE 1000 MILLILITER(S): 9 INJECTION, SOLUTION INTRAVENOUS at 02:00

## 2025-02-08 NOTE — PHYSICAL THERAPY INITIAL EVALUATION ADULT - SPECIFY REASON(S)
920 am PT attempted to see pt for eval, was seen in CCU this am. Pt. however noted with decreased arterial BP =69/33 , , checked peripheral BP =68/32 mm Hg. Pt denies lightheadedness, STEVEN Marques was

## 2025-02-08 NOTE — PROGRESS NOTE ADULT - ASSESSMENT
ALLIE TORRE is a 74y man with a medical history significant for valvular AF on warfarin and CHF, who presented initially with subacute worsening of fatigue found to have renal failure on outpatient lab testing, and is now in the critical care unit for acute renal failure.       IMPRESSION:    Borderline adrenal insufficiency  MARIANNE, nonoliguric  AF-RVR  Hypotension   Metabolic acidosis likely 2/2 uremia   Chronic A fib on warfarin  HFmrEF  H/o HTN    PLAN:    CNS: Avoid depressants    HEENT: Oral care    PULMONARY: On RA. HOB @ 45 degrees.  Aspiration precautions.  CXR noted.      CARDIOVASCULAR:   Vasopressors: low-dose Phenylephrine  Increase midodrine 20mg q8  TTE this admission without heart failure  Target MAP >65.    Consult EP for significant AF-RVR.  Recommendations appreciated  Amiodarone transitioned to PO  Start metoprolol once off vasopressors.    GI: GI ppx: PPI daily. Feeding. Bowel regimen PRN    RENAL: LR drip at 100/hour. Check UA, urine Na, urine Cr. Nephrology recommendations appreciated.  Follow up lytes.  Correct as needed.  replace Mg.  TOV today.    INFECTIOUS DISEASE: Follow up cultures.  Now off Abx given no evidence of infection.  ID consulted, appreciate recommendations    HEMATOLOGICAL:  DVT prophylaxis.  Monitor CBC.  INR subtherapeutic today, monitor daily, resume warfarin, will have pharmacy evaluate.    ENDOCRINE:  Follow up FS.  Insulin protocol if needed. BHB negative.  Started dexamethasone, may have worsening BG.  AM cortisol borderline/low, will proceed with ACTH stim.  Pending final lab results.  Follow-up T3.  Appreciate endocrine recommendations.    MUSCULOSKELETAL: OOBTC    DISPO: MICU  CODE: FULL  LINES: L-IJ CVC, a-line, d/c kilgore

## 2025-02-08 NOTE — PROGRESS NOTE ADULT - SUBJECTIVE AND OBJECTIVE BOX
Patient is a 74y old  Male who presents with a chief complaint of Acute renal failure     (06 Feb 2025 16:16)        Over Night Events:    INR now subtherapeutic, back on warfarin  ACTH stim done yesterday, results pending  Appreciate endocrine evaluation  Off vasopressin, phenylephrine dose increased    ROS:     All ROS are negative except HPI       PHYSICAL EXAM    ICU Vital Signs Last 24 Hrs  T(C): 36.9 (08 Feb 2025 08:00), Max: 36.9 (07 Feb 2025 16:00)  T(F): 98.5 (08 Feb 2025 08:00), Max: 98.5 (07 Feb 2025 16:00)  HR: 82 (08 Feb 2025 08:00) (70 - 108)  BP: 110/53 (07 Feb 2025 21:00) (110/53 - 110/53)  BP(mean): 77 (07 Feb 2025 21:00) (77 - 77)  ABP: 108/55 (08 Feb 2025 08:00) (81/45 - 130/66)  ABP(mean): 73 (08 Feb 2025 08:00) (57 - 91)  RR: 21 (08 Feb 2025 08:00) (17 - 34)  SpO2: 95% (08 Feb 2025 08:00) (91% - 97%)    O2 Parameters below as of 08 Feb 2025 08:00  Patient On (Oxygen Delivery Method): room air            Constitutional: no acute distress, well nourished well developed  Neuro: moving all 4 limbs spontaneously, no facial droop or dysarthria  HEENT: NCAT, anicteric  Neck: no visible lymphadenopathy or goiter  Pulm: no respiratory distress. clear to auscultation bilaterally  Cardiac: extremities appear pink and well-perfused.  regular rhythm and rate, no murmur detected  Abdomen: non-distended  Extremities: no peripheral edema      02-07-25 @ 07:01  -  02-08-25 @ 07:00  --------------------------------------------------------  IN:    IV PiggyBack: 1250 mL    Lactated Ringers: 200 mL    Lactated Ringers: 2400 mL    Oral Fluid: 480 mL    Phenylephrine: 263.1 mL    Vasopressin: 12 mL  Total IN: 4605.1 mL    OUT:    Indwelling Catheter - Urethral (mL): 3750 mL  Total OUT: 3750 mL    Total NET: 855.1 mL          LABS:                            8.3    9.01  )-----------( 219      ( 08 Feb 2025 05:37 )             25.6                                               02-08    138  |  105  |  31[H]  ----------------------------<  127[H]  4.0   |  22  |  1.3    Ca    7.3[L]      08 Feb 2025 05:37  Mg     1.6     02-08    TPro  4.9[L]  /  Alb  3.3[L]  /  TBili  0.6  /  DBili  x   /  AST  47[H]  /  ALT  48[H]  /  AlkPhos  45  02-08      PT/INR - ( 08 Feb 2025 05:37 )   PT: 17.60 sec;   INR: 1.48 ratio                                                Urinalysis Basic - ( 08 Feb 2025 05:37 )    Color: x / Appearance: x / SG: x / pH: x  Gluc: 127 mg/dL / Ketone: x  / Bili: x / Urobili: x   Blood: x / Protein: x / Nitrite: x   Leuk Esterase: x / RBC: x / WBC x   Sq Epi: x / Non Sq Epi: x / Bacteria: x                                                  LIVER FUNCTIONS - ( 08 Feb 2025 05:37 )  Alb: 3.3 g/dL / Pro: 4.9 g/dL / ALK PHOS: 45 U/L / ALT: 48 U/L / AST: 47 U/L / GGT: x                                                                                                                                       MEDICATIONS  (STANDING):  aMIOdarone    Tablet 200 milliGRAM(s) Oral two times a day  calcium acetate 667 milliGRAM(s) Oral three times a day with meals  chlorhexidine 2% Cloths 1 Application(s) Topical daily  dexAMETHasone  Injectable 6 milliGRAM(s) IV Push every 24 hours  ezetimibe 10 milliGRAM(s) Oral daily  lactated ringers. 1000 milliLiter(s) (150 mL/Hr) IV Continuous <Continuous>  magnesium oxide 400 milliGRAM(s) Oral three times a day with meals  magnesium sulfate  IVPB 2 Gram(s) IV Intermittent every 2 hours  magnesium sulfate  IVPB 1 Gram(s) IV Intermittent once  midodrine 15 milliGRAM(s) Oral every 8 hours  pantoprazole    Tablet 40 milliGRAM(s) Oral before breakfast  phenylephrine    Infusion 0.1 MICROgram(s)/kG/Min (1.97 mL/Hr) IV Continuous <Continuous>  rosuvastatin 40 milliGRAM(s) Oral at bedtime    MEDICATIONS  (PRN):  lidocaine 5% Ointment 1 Application(s) Topical four times a day PRN pain at kilgore area

## 2025-02-08 NOTE — PHYSICAL THERAPY INITIAL EVALUATION ADULT - DID THE PATIENT HAVE SURGERY?
notified. Pt was resumed on IV pressors by RN. PT plan of care discussed. Pt's baseline: fully independent with no use of Assistive Device at home.  Will f/u once pt's medical condition improves.

## 2025-02-08 NOTE — PROGRESS NOTE ADULT - ASSESSMENT
Patient is a 74-year-old male with past medical history of hypertension, dyslipidemia, mitral valve regurg, A-fib on Coumadin,HFrEF with an EF of 45% in december , and status post AICD presenting for weakness. Patient endorses persistent, complaining generalized weakness, fatigue, anorexia and poor p.o. intake for the past 5-6 weeks. Patient also reports a significant weight loss. Patient  states he had recent blood work which showed worsening kidney function, creatinine was 3.1 in  january up from a baseline of 1,2 in december and currently creatinine in 5.3.   Patient also hypotensive on pressors now, suspicion of AI, had rapid afib started on amio.    #Shock, unclear source  #Suspicion of AI  -Pt was hypotensive, no fever, no leukocytosis  -Hb stable, so signs of bleeding  -TTE: EF 45-50% with moderate TR only  -UA negative  -CXR normal  -CTAP negative  -RVP negative, MRSA negative  -Blood cx negative  -Received fluids, not responsive, started on levophed  -After having rapid rate Afib on 2/5, switched to phenylephrine and vasopressin was added, today still on vaso 0.04 and balbir low dose (improved requirements), will stop vaso  -Cx taken, started empirically on cefepime, then ora, now ID cs recommending stopping all ABx as no source and cultures all negative  -TSH low 0.22, but free T4 normal so likely sick euthyroid syndrome  -AM cortisol intermediate at 4.8 (inappropriately low) so ordered ACTH level, and will do a low dose ACTH stim test today  -Started on dexa 6 mg, now held for the stim test, will resume stress dose steroids after stim test  -Endo consulted- pending ACTH stim test    #Afib, was in RVR, now rate controlled  -Amio loaded and started on amio drip on 2/5, switched to PO amio   -Received 2 doses of metoprolol 5mg IV each  -Now rate controlled  -On metoprolol 200mg at home, holding now in the setting of hypotension  -EP consulted  -On coumadin at home 5 mg daily (since it's a valvular Afib), holding as INR supra-therapeutic (10.4 -> 7 ->4),   -Ordered coumadin 3.5 mg today, check INR daily  - Warfarin dose adjusted due to CYP effects on amio levels    #MARIANNE  #HAGMA  -Crea trended up as per patient from 1.2 baseline, to 3.1 in Jan, and now 5.3 on admission  - Trend daily  -Holding home aldactone, ACEi, entresto and farxiga  -Not euglycemic DKA as BHB negative  -Art inserted for accurate I/O  -On  cc/h as per nephro  -PO HCO3 started  -Midodrine 10mg q8  -KBUS negative  -MM w/u follow up    #HFimpEF  -TTE showed EF 45-50%, mod TR, s/p mitral annuloplasty  -Holding all his GDMT now as pt in shock, would resume progressively when out of shock    #Diet: DASH  #DVT pro: Will resume coumadin as INR within range  #GI pro: pantoprazole  #Activity: as tolerated  #Dispo: ICU  #Code status: Full code as per patient    Pending/Follow up:  Warfarin dose adjustments for Amiodarone  Trend Creatinine  ACTH stim test results  Monitoring off abx

## 2025-02-08 NOTE — PHARMACOTHERAPY INTERVENTION NOTE - COMMENTS
ALLIE ROUSEATO74yMale    Indication: afib  INR Goal: 2-3  Home Dose: 5 mg daily per Dr. Mejia  Bridge Therapy:    Current Medications:  aMIOdarone    Tablet 200 milliGRAM(s) Oral two times a day  calcium acetate 667 milliGRAM(s) Oral three times a day with meals  chlorhexidine 2% Cloths 1 Application(s) Topical daily  dexAMETHasone  Injectable 6 milliGRAM(s) IV Push every 24 hours  ezetimibe 10 milliGRAM(s) Oral daily  lactated ringers. 1000 milliLiter(s) IV Continuous <Continuous>  lidocaine 5% Ointment 1 Application(s) Topical four times a day PRN  magnesium oxide 400 milliGRAM(s) Oral three times a day with meals  magnesium sulfate  IVPB 2 Gram(s) IV Intermittent every 2 hours  magnesium sulfate  IVPB 1 Gram(s) IV Intermittent once  midodrine 15 milliGRAM(s) Oral every 8 hours  pantoprazole    Tablet 40 milliGRAM(s) Oral before breakfast  phenylephrine    Infusion 0.1 MICROgram(s)/kG/Min IV Continuous <Continuous>  rosuvastatin 40 milliGRAM(s) Oral at bedtime      hemoglobin 8.3 g/dL (02-08-25 @ 05:37)    hematocrit 25.6 % (02-08-25 @ 05:37)    PLT: 219 K/uL (02-08-25 @ 05:37)    GFR:58 mL/min/1.73m2 (02-08-25 @ 05:37)      Drug Interactions: Amiodarone    INR trend  10.47 ratio (02-04-25 @ 23:35)  7.88 ratio (02-05-25 @ 06:20)  4.00 ratio (02-06-25 @ 04:50)  2.14 ratio (02-07-25 @ 06:30)  1.48 ratio (02-08-25 @ 05:37)      Warfarin administration history:  warfarin: 3.5 milliGRAM(s) (02-07-25 @ 21:13)        1. INR today is:  1.48 ratio (02-08-25 @ 05:37)                                              [ X ] below goal ----- This is likely due to held doses from admission and restarted yesterday                                            [   ] at goal                                            [   ] above goal ------ This is likely due to       2. Recommend Warfarin 3.5mg PO x 1  3. Obtain INR tomorrow AM     ALLIE ROUSEATO74yMale    Indication: afib  INR Goal: 2-3  Home Dose: 5 mg daily per Dr. Mejia  Bridge Therapy:    Current Medications:  aMIOdarone    Tablet 200 milliGRAM(s) Oral two times a day  calcium acetate 667 milliGRAM(s) Oral three times a day with meals  chlorhexidine 2% Cloths 1 Application(s) Topical daily  dexAMETHasone  Injectable 6 milliGRAM(s) IV Push every 24 hours  ezetimibe 10 milliGRAM(s) Oral daily  lactated ringers. 1000 milliLiter(s) IV Continuous <Continuous>  lidocaine 5% Ointment 1 Application(s) Topical four times a day PRN  magnesium oxide 400 milliGRAM(s) Oral three times a day with meals  magnesium sulfate  IVPB 2 Gram(s) IV Intermittent every 2 hours  magnesium sulfate  IVPB 1 Gram(s) IV Intermittent once  midodrine 15 milliGRAM(s) Oral every 8 hours  pantoprazole    Tablet 40 milliGRAM(s) Oral before breakfast  phenylephrine    Infusion 0.1 MICROgram(s)/kG/Min IV Continuous <Continuous>  rosuvastatin 40 milliGRAM(s) Oral at bedtime      hemoglobin 8.3 g/dL (02-08-25 @ 05:37)    hematocrit 25.6 % (02-08-25 @ 05:37)    PLT: 219 K/uL (02-08-25 @ 05:37)    GFR:58 mL/min/1.73m2 (02-08-25 @ 05:37)      Drug Interactions: Amiodarone    INR trend  10.47 ratio (02-04-25 @ 23:35)  7.88 ratio (02-05-25 @ 06:20)  4.00 ratio (02-06-25 @ 04:50)  2.14 ratio (02-07-25 @ 06:30)  1.48 ratio (02-08-25 @ 05:37)      Warfarin administration history:  warfarin: 3.5 milliGRAM(s) (02-07-25 @ 21:13)        1. INR today is:  1.48 ratio (02-08-25 @ 05:37)                                              [ X ] below goal ----- This is likely due to held doses from admission and restarted yesterday                                            [   ] at goal                                            [   ] above goal ------ This is likely due to       2. Recommend Warfarin 3.5mg PO x 1    3. Obtain INR tomorrow AM

## 2025-02-08 NOTE — PROGRESS NOTE ADULT - SUBJECTIVE AND OBJECTIVE BOX
Patient is a 74y old  Male who presents with a chief complaint of Acute renal failure     (06 Feb 2025 16:16)      INTERVAL HPI/OVERNIGHT EVENTS:   No overnight events   Afebrile, hemodynamically stable     ICU Vital Signs Last 24 Hrs  T(C): 36.1 (08 Feb 2025 04:00), Max: 36.9 (07 Feb 2025 05:00)  T(F): 97 (08 Feb 2025 04:00), Max: 98.5 (07 Feb 2025 16:00)  HR: 85 (08 Feb 2025 04:00) (70 - 108)  BP: 110/53 (07 Feb 2025 21:00) (110/53 - 110/53)  BP(mean): 77 (07 Feb 2025 21:00) (77 - 77)  ABP: 81/45 (08 Feb 2025 04:00) (72/41 - 130/66)  ABP(mean): 57 (08 Feb 2025 04:00) (53 - 91)  RR: 17 (08 Feb 2025 04:00) (17 - 34)  SpO2: 97% (08 Feb 2025 04:00) (91% - 97%)    O2 Parameters below as of 08 Feb 2025 05:00  Patient On (Oxygen Delivery Method): room air          I&O's Summary    06 Feb 2025 07:01  -  07 Feb 2025 07:00  --------------------------------------------------------  IN: 5640.2 mL / OUT: 3975 mL / NET: 1665.2 mL    07 Feb 2025 07:01  -  08 Feb 2025 04:55  --------------------------------------------------------  IN: 4205.1 mL / OUT: 3200 mL / NET: 1005.1 mL          LABS:                        8.8    10.25 )-----------( 215      ( 07 Feb 2025 11:50 )             26.2     02-07    145  |  110  |  32[H]  ----------------------------<  128[H]  3.5   |  23  |  1.3    Ca    8.0[L]      07 Feb 2025 15:36  Mg     2.3     02-07    TPro  5.5[L]  /  Alb  3.5  /  TBili  0.7  /  DBili  x   /  AST  43[H]  /  ALT  44[H]  /  AlkPhos  44  02-07    PT/INR - ( 07 Feb 2025 06:30 )   PT: 25.70 sec;   INR: 2.14 ratio           Urinalysis Basic - ( 07 Feb 2025 15:36 )    Color: x / Appearance: x / SG: x / pH: x  Gluc: 128 mg/dL / Ketone: x  / Bili: x / Urobili: x   Blood: x / Protein: x / Nitrite: x   Leuk Esterase: x / RBC: x / WBC x   Sq Epi: x / Non Sq Epi: x / Bacteria: x      CAPILLARY BLOOD GLUCOSE            RADIOLOGY & ADDITIONAL TESTS:    Consultant(s) Notes Reviewed:  [x ] YES  [ ] NO    MEDICATIONS  (STANDING):  aMIOdarone    Tablet 200 milliGRAM(s) Oral two times a day  calcium acetate 667 milliGRAM(s) Oral three times a day with meals  chlorhexidine 2% Cloths 1 Application(s) Topical daily  dexAMETHasone  Injectable 6 milliGRAM(s) IV Push every 24 hours  ezetimibe 10 milliGRAM(s) Oral daily  lactated ringers. 1000 milliLiter(s) (150 mL/Hr) IV Continuous <Continuous>  magnesium oxide 400 milliGRAM(s) Oral three times a day with meals  midodrine 15 milliGRAM(s) Oral every 8 hours  pantoprazole    Tablet 40 milliGRAM(s) Oral before breakfast  phenylephrine    Infusion 0.1 MICROgram(s)/kG/Min (1.97 mL/Hr) IV Continuous <Continuous>  rosuvastatin 40 milliGRAM(s) Oral at bedtime    MEDICATIONS  (PRN):  lidocaine 5% Ointment 1 Application(s) Topical four times a day PRN pain at [place] area      PHYSICAL EXAM:  GENERAL:   HEAD:  Atraumatic, Normocephalic  EYES: EOMI, PERRLA, conjunctiva and sclera clear  NECK: Supple, No JVD, Normal thyroid, no enlarged nodes  NERVOUS SYSTEM:  Alert & Awake.   CHEST/LUNG: B/L good air entry; No rales, rhonchi, or wheezing  HEART: S1S2 normal, no S3, Regular rate and rhythm; No murmurs  ABDOMEN: Soft, Nontender, Nondistended; Bowel sounds present. +obese  EXTREMITIES:  2+ Peripheral Pulses, No clubbing, cyanosis, or edema  LYMPH: No lymphadenopathy noted  SKIN: No rashes or lesions    Care Discussed with Consultants/Other Providers [ x] YES  [ ] NO

## 2025-02-09 LAB
ALBUMIN SERPL ELPH-MCNC: 3.3 G/DL — LOW (ref 3.5–5.2)
ALP SERPL-CCNC: 57 U/L — SIGNIFICANT CHANGE UP (ref 30–115)
ALT FLD-CCNC: 132 U/L — HIGH (ref 0–41)
ANION GAP SERPL CALC-SCNC: 9 MMOL/L — SIGNIFICANT CHANGE UP (ref 7–14)
AST SERPL-CCNC: 98 U/L — HIGH (ref 0–41)
BASOPHILS # BLD AUTO: 0.01 K/UL — SIGNIFICANT CHANGE UP (ref 0–0.2)
BASOPHILS NFR BLD AUTO: 0.1 % — SIGNIFICANT CHANGE UP (ref 0–1)
BILIRUB SERPL-MCNC: 0.5 MG/DL — SIGNIFICANT CHANGE UP (ref 0.2–1.2)
BUN SERPL-MCNC: 28 MG/DL — HIGH (ref 10–20)
CALCIUM SERPL-MCNC: 7.3 MG/DL — LOW (ref 8.4–10.5)
CHLORIDE SERPL-SCNC: 109 MMOL/L — SIGNIFICANT CHANGE UP (ref 98–110)
CO2 SERPL-SCNC: 27 MMOL/L — SIGNIFICANT CHANGE UP (ref 17–32)
CREAT SERPL-MCNC: 1.3 MG/DL — SIGNIFICANT CHANGE UP (ref 0.7–1.5)
EGFR: 58 ML/MIN/1.73M2 — LOW
EOSINOPHIL # BLD AUTO: 0.04 K/UL — SIGNIFICANT CHANGE UP (ref 0–0.7)
EOSINOPHIL NFR BLD AUTO: 0.3 % — SIGNIFICANT CHANGE UP (ref 0–8)
GLUCOSE SERPL-MCNC: 147 MG/DL — HIGH (ref 70–99)
HCT VFR BLD CALC: 26.6 % — LOW (ref 42–52)
HGB BLD-MCNC: 8.4 G/DL — LOW (ref 14–18)
IMM GRANULOCYTES NFR BLD AUTO: 1 % — HIGH (ref 0.1–0.3)
INR BLD: 1.31 RATIO — HIGH (ref 0.65–1.3)
LYMPHOCYTES # BLD AUTO: 0.95 K/UL — LOW (ref 1.2–3.4)
LYMPHOCYTES # BLD AUTO: 8.2 % — LOW (ref 20.5–51.1)
MAGNESIUM SERPL-MCNC: 1.9 MG/DL — SIGNIFICANT CHANGE UP (ref 1.8–2.4)
MCHC RBC-ENTMCNC: 30.3 PG — SIGNIFICANT CHANGE UP (ref 27–31)
MCHC RBC-ENTMCNC: 31.6 G/DL — LOW (ref 32–37)
MCV RBC AUTO: 96 FL — HIGH (ref 80–94)
MONOCYTES # BLD AUTO: 0.48 K/UL — SIGNIFICANT CHANGE UP (ref 0.1–0.6)
MONOCYTES NFR BLD AUTO: 4.1 % — SIGNIFICANT CHANGE UP (ref 1.7–9.3)
NEUTROPHILS # BLD AUTO: 10.02 K/UL — HIGH (ref 1.4–6.5)
NEUTROPHILS NFR BLD AUTO: 86.3 % — HIGH (ref 42.2–75.2)
NRBC # BLD: 0 /100 WBCS — SIGNIFICANT CHANGE UP (ref 0–0)
NRBC BLD-RTO: 0 /100 WBCS — SIGNIFICANT CHANGE UP (ref 0–0)
PLATELET # BLD AUTO: 257 K/UL — SIGNIFICANT CHANGE UP (ref 130–400)
PMV BLD: 11.9 FL — HIGH (ref 7.4–10.4)
POTASSIUM SERPL-MCNC: 4.1 MMOL/L — SIGNIFICANT CHANGE UP (ref 3.5–5)
POTASSIUM SERPL-SCNC: 4.1 MMOL/L — SIGNIFICANT CHANGE UP (ref 3.5–5)
PROT SERPL-MCNC: 5.2 G/DL — LOW (ref 6–8)
PROTHROM AB SERPL-ACNC: 15.6 SEC — HIGH (ref 9.95–12.87)
RBC # BLD: 2.77 M/UL — LOW (ref 4.7–6.1)
RBC # FLD: 12.7 % — SIGNIFICANT CHANGE UP (ref 11.5–14.5)
SODIUM SERPL-SCNC: 145 MMOL/L — SIGNIFICANT CHANGE UP (ref 135–146)
WBC # BLD: 11.62 K/UL — HIGH (ref 4.8–10.8)
WBC # FLD AUTO: 11.62 K/UL — HIGH (ref 4.8–10.8)

## 2025-02-09 PROCEDURE — 99232 SBSQ HOSP IP/OBS MODERATE 35: CPT | Mod: GC

## 2025-02-09 PROCEDURE — 99232 SBSQ HOSP IP/OBS MODERATE 35: CPT

## 2025-02-09 RX ADMIN — Medication 400 MILLIGRAM(S): at 12:30

## 2025-02-09 RX ADMIN — EZETIMIBE 10 MILLIGRAM(S): 10 TABLET ORAL at 12:31

## 2025-02-09 RX ADMIN — ROSUVASTATIN CALCIUM 40 MILLIGRAM(S): 20 TABLET, FILM COATED ORAL at 21:19

## 2025-02-09 RX ADMIN — Medication 400 MILLIGRAM(S): at 09:07

## 2025-02-09 RX ADMIN — Medication 400 MILLIGRAM(S): at 18:04

## 2025-02-09 RX ADMIN — AMIODARONE HYDROCHLORIDE 200 MILLIGRAM(S): 50 INJECTION, SOLUTION INTRAVENOUS at 18:04

## 2025-02-09 RX ADMIN — Medication 667 MILLIGRAM(S): at 18:04

## 2025-02-09 RX ADMIN — DEXAMETHASONE 6 MILLIGRAM(S): 0.5 TABLET ORAL at 01:00

## 2025-02-09 RX ADMIN — AMIODARONE HYDROCHLORIDE 200 MILLIGRAM(S): 50 INJECTION, SOLUTION INTRAVENOUS at 05:11

## 2025-02-09 RX ADMIN — MIDODRINE HYDROCHLORIDE 20 MILLIGRAM(S): 5 TABLET ORAL at 15:14

## 2025-02-09 RX ADMIN — Medication 667 MILLIGRAM(S): at 12:31

## 2025-02-09 RX ADMIN — Medication 3.5 MILLIGRAM(S): at 21:19

## 2025-02-09 RX ADMIN — Medication 667 MILLIGRAM(S): at 09:07

## 2025-02-09 RX ADMIN — MIDODRINE HYDROCHLORIDE 20 MILLIGRAM(S): 5 TABLET ORAL at 21:19

## 2025-02-09 RX ADMIN — Medication 40 MILLIGRAM(S): at 05:11

## 2025-02-09 RX ADMIN — MIDODRINE HYDROCHLORIDE 20 MILLIGRAM(S): 5 TABLET ORAL at 05:12

## 2025-02-09 NOTE — PHARMACOTHERAPY INTERVENTION NOTE - COMMENTS
ALLIE ORUSEATO74yMale    Indication: afib  INR Goal: 2-3  Home Dose: 5 mg daily per Dr. Mejia  Bridge Therapy:    Current Medications:  aMIOdarone    Tablet 200 milliGRAM(s) Oral two times a day  calcium acetate 667 milliGRAM(s) Oral three times a day with meals  chlorhexidine 2% Cloths 1 Application(s) Topical daily  ezetimibe 10 milliGRAM(s) Oral daily  lidocaine 5% Ointment 1 Application(s) Topical four times a day PRN  magnesium oxide 400 milliGRAM(s) Oral three times a day with meals  midodrine 20 milliGRAM(s) Oral every 8 hours  phenylephrine    Infusion 0.1 MICROgram(s)/kG/Min IV Continuous <Continuous>  rosuvastatin 40 milliGRAM(s) Oral at bedtime  warfarin 3.5 milliGRAM(s) Oral once      hemoglobin 8.4 g/dL (02-09-25 @ 04:30)    hematocrit 26.6 % (02-09-25 @ 04:30)    PLT: 257 K/uL (02-09-25 @ 04:30)    GFR:58 mL/min/1.73m2 (02-09-25 @ 05:55)      Drug Interactions:     INR trend  10.47 ratio (02-04-25 @ 23:35)  7.88 ratio (02-05-25 @ 06:20)  4.00 ratio (02-06-25 @ 04:50)  2.14 ratio (02-07-25 @ 06:30)  1.48 ratio (02-08-25 @ 05:37)  1.31 ratio (02-09-25 @ 05:55)      Warfarin administration history:  warfarin: 3.5 milliGRAM(s) (02-07-25 @ 21:13)  warfarin: 3.5 milliGRAM(s) (02-08-25 @ 21:07)        1. INR today is:  1.31 ratio (02-09-25 @ 05:55)                                              [ X ] below goal ----- This is likely due to                                            [   ] at goal                                            [   ] above goal ------ This is likely due to       2. Agreed with Warfarin 3.5 mg PO x 1 tonight and can consider increasing the dose tomorrow if he does not respond.  3. Obtain INR tomorrow AM     ALLIE [id number]    Indication: afib  INR Goal: 2-3  Home Dose: 5 mg daily per Dr. Mejia  Bridge Therapy:    Current Medications:  aMIOdarone    Tablet 200 milliGRAM(s) Oral two times a day  calcium acetate 667 milliGRAM(s) Oral three times a day with meals  chlorhexidine 2% Cloths 1 Application(s) Topical daily  ezetimibe 10 milliGRAM(s) Oral daily  lidocaine 5% Ointment 1 Application(s) Topical four times a day PRN  magnesium oxide 400 milliGRAM(s) Oral three times a day with meals  midodrine 20 milliGRAM(s) Oral every 8 hours  phenylephrine    Infusion 0.1 MICROgram(s)/kG/Min IV Continuous <Continuous>  rosuvastatin 40 milliGRAM(s) Oral at bedtime  warfarin 3.5 milliGRAM(s) Oral once      hemoglobin 8.4 g/dL (02-09-25 @ 04:30)    hematocrit 26.6 % (02-09-25 @ 04:30)    PLT: 257 K/uL (02-09-25 @ 04:30)    GFR:58 mL/min/1.73m2 (02-09-25 @ 05:55)      Drug Interactions:     INR trend  10.47 ratio (02-04-25 @ 23:35)  7.88 ratio (02-05-25 @ 06:20)  4.00 ratio (02-06-25 @ 04:50)  2.14 ratio (02-07-25 @ 06:30)  1.48 ratio (02-08-25 @ 05:37)  1.31 ratio (02-09-25 @ 05:55)      Warfarin administration history:  warfarin: 3.5 milliGRAM(s) (02-07-25 @ 21:13)  warfarin: 3.5 milliGRAM(s) (02-08-25 @ 21:07)        1. INR today is:  1.31 ratio (02-09-25 @ 05:55)                                              [ X ] below goal ----- This is likely due to held doses of warfarin                                            [   ] at goal                                            [   ] above goal ------ This is likely due to       2. Agreed with Warfarin 3.5 mg PO x 1 tonight and can consider increasing the dose tomorrow if he does not respond.  3. Obtain INR tomorrow AM

## 2025-02-09 NOTE — PROGRESS NOTE ADULT - ASSESSMENT
Patient is a 74-year-old male with past medical history of hypertension, dyslipidemia, mitral valve regurg, A-fib on Coumadin,HFrEF with an EF of 45% in december , and status post AICD presenting for weakness. Patient endorses persistent, complaining generalized weakness, fatigue, anorexia and poor p.o. intake for the past 5-6 weeks. Patient also reports a significant weight loss. Patient  states he had recent blood work which showed worsening kidney function, creatinine was 3.1 in  january up from a baseline of 1,2 in december and currently creatinine in 5.3.   Patient also hypotensive on pressors now, suspicion of AI, had rapid afib started on amio.    #Shock, unclear source  #Suspicion of AI  -Pt was hypotensive, no fever, no leukocytosis  -Hb stable, so signs of bleeding  -TTE: EF 45-50% with moderate TR only  -UA negative  -CXR normal  -CTAP negative  -RVP negative, MRSA negative  -Blood cx negative  -Received fluids, not responsive, started on levophed  -After having rapid rate Afib on 2/5, switched to phenylephrine and vasopressin was added, on balbir low dose (improved requirements), off vaso  -Cx taken, started empirically on cefepime, then ora, now ID cs recommending stopping all ABx as no source and cultures all negative  -TSH low 0.22, but free T4 normal so likely sick euthyroid syndrome  -AM cortisol intermediate at 4.8 (inappropriately low) so ordered ACTH level, and will do a low dose ACTH stim test today  -Started on dexa 6 mg, now held for the stim test, will resume stress dose steroids after stim test  -Endo consulted  -Stim test revealed no adrenal gland issue as cortisol level increased appropriately to >18    #Afib, was in RVR, now rate controlled  -Amio loaded and started on amio drip on 2/5, switched to PO amio   -Received 2 doses of metoprolol 5mg IV each  -Now rate controlled  -On metoprolol 200mg at home, holding now in the setting of hypotension  -EP consulted  -On coumadin at home 5 mg daily (since it's a valvular Afib), holding as INR supra-therapeutic (10.4 -> 7 ->4),   -Ordered coumadin 3.5 mg on 2/8, check INR daily  - Warfarin dose adjusted due to CYP effects with amio    #MARIANNE  #HAGMA  -Crea trended up as per patient from 1.2 baseline, to 3.1 in Jan, and now 5.3 on admission  - Trend daily  -Holding home aldactone, ACEi, entresto and farxiga  -Not euglycemic DKA as BHB negative  -Art inserted for accurate I/O  -On  cc/h as per nephro  -PO HCO3 started  -Midodrine 10mg q8  -KBUS negative  -MM w/u follow up    #HFimpEF  -TTE showed EF 45-50%, mod TR, s/p mitral annuloplasty  -Holding all his GDMT now as pt in shock, would resume progressively when out of shock    #Diet: DASH  #DVT pro: Will resume coumadin as INR within range  #GI pro: pantoprazole  #Activity: as tolerated  #Dispo: ICU  #Code status: Full code as per patient    Pending/Follow up:  Warfarin dose adjustments  Trend Creatinine  Monitoring off abx     Patient is a 74-year-old male with past medical history of hypertension, dyslipidemia, mitral valve regurg, A-fib on Coumadin,HFrEF with an EF of 45% in december , and status post AICD presenting for weakness. Patient endorses persistent, complaining generalized weakness, fatigue, anorexia and poor p.o. intake for the past 5-6 weeks. Patient also reports a significant weight loss. Patient  states he had recent blood work which showed worsening kidney function, creatinine was 3.1 in  january up from a baseline of 1,2 in december and currently creatinine in 5.3.   Patient also hypotensive on pressors now, suspicion of AI, had rapid afib started on amio.    #Shock, unclear source  #Suspicion of AI  -Pt was hypotensive, no fever, no leukocytosis  -Hb stable, so signs of bleeding  -TTE: EF 45-50% with moderate TR only  -UA negative  -CXR normal  -CTAP negative  -RVP negative, MRSA negative  -Blood cx negative  -Received fluids, not responsive, started on levophed  -After having rapid rate Afib on 2/5, switched to phenylephrine and vasopressin was added, on balbir low dose (improved requirements), off vaso  -Cx taken, started empirically on cefepime, then ora, now ID cs recommending stopping all ABx as no source and cultures all negative  -TSH low 0.22, but free T4 normal so likely sick euthyroid syndrome  -AM cortisol intermediate at 4.8 (inappropriately low) so ordered ACTH level, and will do a low dose ACTH stim test today  -Started on dexa 6 mg, now held for the stim test, will resume stress dose steroids after stim test  -Endo consulted  -Stim test revealed no adrenal gland issue as cortisol level increased appropriately to >18  -2/9 Stop Dexamethasone. Monitor off steroids.   -2/9 Follow-up Endocrinology recommendations.   -2/9 Off pressors. Hemodynamically stable, MAP >65.      #Afib, was in RVR, now rate controlled  -Amio loaded and started on amio drip on 2/5, switched to PO amio   -Received 2 doses of metoprolol 5mg IV each  -Now rate controlled  -On metoprolol 200mg at home, holding now in the setting of hypotension  -EP consulted  -On coumadin at home 5 mg daily (since it's a valvular Afib), holding as INR supra-therapeutic (10.4 -> 7 ->4),   -Ordered coumadin 3.5 mg on 2/8, check INR daily  - Warfarin dose adjusted due to CYP effects with amio    #MARIANNE  #HAGMA  -Crea trended up as per patient from 1.2 baseline, to 3.1 in Jan, and now 5.3 on admission  - Trend daily  -Holding home aldactone, ACEi, entresto and farxiga  -Not euglycemic DKA as BHB negative  -Art inserted for accurate I/O  -On  cc/h as per nephro  -PO HCO3 started  -Midodrine 10mg q8  -KBUS negative  -MM w/u follow up    #HFimpEF  -TTE showed EF 45-50%, mod TR, s/p mitral annuloplasty  -Holding all his GDMT now as pt in shock, would resume progressively when out of shock    #Diet: DASH  #DVT pro: Will resume coumadin as INR within range  #GI pro: Stop GI PPx, no longer on steroids.   #Activity: as tolerated  #Dispo: ICU  #Code status: Full code as per patient    Pending/Follow up:  Warfarin dose adjustments  Trend Creatinine  Monitoring off abx

## 2025-02-09 NOTE — PROGRESS NOTE ADULT - SUBJECTIVE AND OBJECTIVE BOX
SUBJECTIVE/OVERNIGHT EVENTS  Today is hospital day 5d. This morning patient was seen and examined at bedside, resting comfortably in bed. No acute or major events overnight.    MEDICATIONS  STANDING MEDICATIONS  aMIOdarone    Tablet 200 milliGRAM(s) Oral two times a day  calcium acetate 667 milliGRAM(s) Oral three times a day with meals  chlorhexidine 2% Cloths 1 Application(s) Topical daily  dexAMETHasone  Injectable 6 milliGRAM(s) IV Push every 24 hours  ezetimibe 10 milliGRAM(s) Oral daily  lactated ringers. 1000 milliLiter(s) IV Continuous <Continuous>  magnesium oxide 400 milliGRAM(s) Oral three times a day with meals  midodrine 20 milliGRAM(s) Oral every 8 hours  pantoprazole    Tablet 40 milliGRAM(s) Oral before breakfast  phenylephrine    Infusion 0.1 MICROgram(s)/kG/Min IV Continuous <Continuous>  rosuvastatin 40 milliGRAM(s) Oral at bedtime  warfarin 3.5 milliGRAM(s) Oral at bedtime    PRN MEDICATIONS  lidocaine 5% Ointment 1 Application(s) Topical four times a day PRN    VITALS  T(F): 99.3 (02-08-25 @ 20:00), Max: 99.3 (02-08-25 @ 20:00)  HR: 87 (02-09-25 @ 02:00) (81 - 113)  BP: 68/32 (02-08-25 @ 09:15) (68/32 - 68/32)  RR: 20 (02-09-25 @ 02:00) (17 - 37)  SpO2: 97% (02-09-25 @ 02:00) (92% - 97%)    PHYSICAL EXAM  CCO*3  GBAE, clear lungs  Regular S1S2, no murmurs  Soft abdomen, non tender  No LLE      LABS             8.3    9.01  )-----------( 219      ( 02-08-25 @ 05:37 )             25.6     138  |  105  |  31  -------------------------<  127   02-08-25 @ 05:37  4.0  |  22  |  1.3    Ca      7.3     02-08-25 @ 05:37  Mg     1.6     02-08-25 @ 05:37    TPro  4.9  /  Alb  3.3  /  TBili  0.6  /  DBili  x   /  AST  47  /  ALT  48  /  AlkPhos  45  /  GGT  x     02-08-25 @ 05:37    PT/INR - ( 02-08-25 @ 05:37 )   PT: 17.60 sec[H];   INR: 1.48 ratio[H]      Urinalysis Basic - ( 08 Feb 2025 05:37 )    Color: x / Appearance: x / SG: x / pH: x  Gluc: 127 mg/dL / Ketone: x  / Bili: x / Urobili: x   Blood: x / Protein: x / Nitrite: x   Leuk Esterase: x / RBC: x / WBC x   Sq Epi: x / Non Sq Epi: x / Bacteria: x          IMAGING

## 2025-02-09 NOTE — PROGRESS NOTE ADULT - SUBJECTIVE AND OBJECTIVE BOX
S: stable overall improved   O:Vital Signs Last 24 Hrs  T(C): 36.5 (09 Feb 2025 08:00), Max: 37.4 (08 Feb 2025 20:00)  T(F): 97.7 (09 Feb 2025 08:00), Max: 99.3 (08 Feb 2025 20:00)  HR: 83 (09 Feb 2025 10:30) (76 - 113)  BP: 93/54 (09 Feb 2025 10:30) (84/51 - 137/74)  BP(mean): 68 (09 Feb 2025 10:30) (60 - 105)  RR: 21 (09 Feb 2025 10:30) (16 - 37)  SpO2: 90% (09 Feb 2025 10:30) (90% - 97%)    Parameters below as of 09 Feb 2025 08:00  Patient On (Oxygen Delivery Method): room air        MEDICATIONS  (STANDING):  aMIOdarone    Tablet 200 milliGRAM(s) Oral two times a day  calcium acetate 667 milliGRAM(s) Oral three times a day with meals  chlorhexidine 2% Cloths 1 Application(s) Topical daily  ezetimibe 10 milliGRAM(s) Oral daily  magnesium oxide 400 milliGRAM(s) Oral three times a day with meals  midodrine 20 milliGRAM(s) Oral every 8 hours  phenylephrine    Infusion 0.1 MICROgram(s)/kG/Min (1.97 mL/Hr) IV Continuous <Continuous>  rosuvastatin 40 milliGRAM(s) Oral at bedtime  warfarin 3.5 milliGRAM(s) Oral once    MEDICATIONS  (PRN):  lidocaine 5% Ointment 1 Application(s) Topical four times a day PRN pain at [facility]        02-09    145  |  109  |  28[H]  ----------------------------<  147[H]  4.1   |  27  |  1.3    Ca    7.3[L]      09 Feb 2025 05:55  Mg     1.9     02-09    TPro  5.2[L]  /  Alb  3.3[L]  /  TBili  0.5  /  DBili  x   /  AST  98[H]  /  ALT  132[H]  /  AlkPhos  57  02-09

## 2025-02-09 NOTE — CHART NOTE - NSCHARTNOTEFT_GEN_A_CORE
CCU DOWN GRADE NOTE  From: CCU  To: SDU    HPI:  Patient is a 74-year-old male with past medical history of hypertension, dyslipidemia, mitral valve regurg, A-fib on Coumadin,HFrEF with an EF of 45% in december , and status post AICD presenting for weakness. Patient endorses persistent, complaining generalized weakness, fatigue, anorexia and poor p.o. intake for the past 5-6 weeks. Patient also reports a significant weight loss. Patient  states he had recent blood work which showed worsening kidney function, creatinine was 3.1 in  january up from a baseline of 1,2 in december and currently creatinine in 5.3.     HOSPITAL COURSE:  CCU:  A 74-year-old male with a history of hypertension, dyslipidemia, mitral valve regurgitation, atrial fibrillation on Coumadin, heart failure with reduced ejection fraction (HFrEF), and an AICD presented with weakness, fatigue, anorexia, poor oral intake, significant weight loss, and worsening acute kidney injury (MARIANNE) over several weeks.  His creatinine edvin from 1.2 to 5.3.  He was hypotensive, requiring pressors (levophed, phenylephrine, vasopressin, and low-dose norepinephrine).  The source of shock was unclear.  Infections were ruled out with negative cultures and imaging. He developed rapid atrial fibrillation and was started on amiodarone. His Coumadin was held due to a supratherapeutic INR and later resumed with dose adjustment per CYP cohepatic meabolism.  He was found to have sick euthyroid syndrome and a low AM cortisol, but an ACTH stimulation test ruled out adrenal insufficiency. Steroids were briefly started then discontinued. Endocrinology still following. His home medications for heart failure and hypertension were held due to hypotension. He was also started on bicarbonate for high anion gap metabolic acidosis (HAGMA), now discontinued. His hemodynamics stabilized, and he was weaned off pressors. Patient is now hemodynamically stable, off pressors, unremarkable physical exam, and stable for downgrade to SDU approved by Dr. Anne.    REVIEW OF SYSTEMS:  CONSTITUTIONAL: No fever, chills  HEENT:  No blurry vision No sinus or throat pain  NECK: No pain or stiffness  RESPIRATORY: No cough, wheezing, chills or hemoptysis; No shortness of breath  CARDIOVASCULAR: No chest pain, palpitations  GASTROINTESTINAL: No abdominal pain. No nausea, vomiting, or diarrhea  GENITOURINARY: No dysuria  NEUROLOGICAL: No HA, No focal weakness  SKIN: No itching, burning, rashes, or lesions   MUSCULOSKELETAL: No joint pain or swelling; No muscle, back, or extremity pain    MEDICATIONS:  aMIOdarone    Tablet 200 milliGRAM(s) Oral two times a day  calcium acetate 667 milliGRAM(s) Oral three times a day with meals  chlorhexidine 2% Cloths 1 Application(s) Topical daily  ezetimibe 10 milliGRAM(s) Oral daily  lidocaine 5% Ointment 1 Application(s) Topical four times a day PRN  magnesium oxide 400 milliGRAM(s) Oral three times a day with meals  midodrine 20 milliGRAM(s) Oral every 8 hours  phenylephrine    Infusion 0.1 MICROgram(s)/kG/Min IV Continuous <Continuous>  rosuvastatin 40 milliGRAM(s) Oral at bedtime  warfarin 3.5 milliGRAM(s) Oral once  warfarin 3.5 milliGRAM(s) Oral at bedtime    T(C): 36.5 (02-09-25 @ 08:00), Max: 37.4 (02-08-25 @ 20:00)  HR: 82 (02-09-25 @ 08:45) (82 - 113)  BP: 131/54 (02-09-25 @ 08:30) (119/66 - 137/74)  RR: 24 (02-09-25 @ 08:45) (16 - 37)  SpO2: 95% (02-09-25 @ 08:45) (93% - 97%)  Wt(kg): --Vital Signs Last 24 Hrs  T(C): 36.5 (09 Feb 2025 08:00), Max: 37.4 (08 Feb 2025 20:00)  T(F): 97.7 (09 Feb 2025 08:00), Max: 99.3 (08 Feb 2025 20:00)  HR: 82 (09 Feb 2025 08:45) (82 - 113)  BP: 131/54 (09 Feb 2025 08:30) (119/66 - 137/74)  BP(mean): 78 (09 Feb 2025 08:30) (78 - 105)  RR: 24 (09 Feb 2025 08:45) (16 - 37)  SpO2: 95% (09 Feb 2025 08:45) (93% - 97%)    Parameters below as of 09 Feb 2025 08:00  Patient On (Oxygen Delivery Method): room air    PHYSICAL EXAM:  CONSTITUTIONAL: well developed, nontoxic appearing, in no acute distress, speaking in full sentences  SKIN: warm, dry, no rash, cap refill < 2 seconds  HEENT: normocephalic, atraumatic, no conjunctival erythema, moist mucous membranes, patent airway  NECK: supple  CV:  regular rate, regular rhythm, 2+ radial pulses bilaterally  RESP: no wheezes, no rales, no rhonchi, normal work of breathing  ABD: soft, no tenderness, nondistended, no rebound, no guarding  MSK: normal ROM, no cyanosis, no edema  NEURO: alert, oriented, grossly unremarkable  PSYCH: cooperative, appropriate    Consultant(s) Notes Reviewed:  [x ] YES  [ ] NO  Care Discussed with Consultants/Other Providers [ x] YES  [ ] NO    LABS:                        8.4    11.62 )-----------( 257      ( 09 Feb 2025 04:30 )             26.6     02-09    145  |  109  |  28[H]  ----------------------------<  147[H]  4.1   |  27  |  1.3    Ca    7.3[L]      09 Feb 2025 05:55  Mg     1.9     02-09    TPro  5.2[L]  /  Alb  3.3[L]  /  TBili  0.5  /  DBili  x   /  AST  98[H]  /  ALT  132[H]  /  AlkPhos  57  02-09    PT/INR - ( 09 Feb 2025 05:55 )   PT: 15.60 sec;   INR: 1.31 ratio      Urinalysis Basic - ( 09 Feb 2025 05:55 )    Color: x / Appearance: x / SG: x / pH: x  Gluc: 147 mg/dL / Ketone: x  / Bili: x / Urobili: x   Blood: x / Protein: x / Nitrite: x   Leuk Esterase: x / RBC: x / WBC x   Sq Epi: x / Non Sq Epi: x / Bacteria: x    CAPILLARY BLOOD GLUCOSE    Urinalysis Basic - ( 09 Feb 2025 05:55 )    Color: x / Appearance: x / SG: x / pH: x  Gluc: 147 mg/dL / Ketone: x  / Bili: x / Urobili: x   Blood: x / Protein: x / Nitrite: x   Leuk Esterase: x / RBC: x / WBC x   Sq Epi: x / Non Sq Epi: x / Bacteria: x    RADIOLOGY & ADDITIONAL TESTS:    CXR 2/5:  Support devices: Telemetry leads are seen. Left neck central catheter. ICD.  Cardiac/mediastinum/hilum: Cardiomegaly. Status post median sternotomy.  Lung parenchyma/Pleura: No focal parenchymal opacities, pleural effusions, or pneumothorax.  Skeleton/soft tissues: Stable.    US Abdomen/RUQ 2/5:  No hydronephrosis bilaterally.  Urinary bladder collapsed with Art catheter.    US Duplex LE 2/4: No evidence of deep venous thrombosis in either lower extremity.    Imaging Personally Reviewed:  [x ] YES  [ ] NO    ----------------------------------    Patient is a 74-year-old male with past medical history of hypertension, dyslipidemia, mitral valve regurg, A-fib on Coumadin,HFrEF with an EF of 45% in december , and status post AICD presenting for weakness. Patient endorses persistent, complaining generalized weakness, fatigue, anorexia and poor p.o. intake for the past 5-6 weeks. Patient also reports a significant weight loss. Patient  states he had recent blood work which showed worsening kidney function, creatinine was 3.1 in  january up from a baseline of 1,2 in december and currently creatinine in 5.3.   Patient also hypotensive on pressors now, suspicion of AI, had rapid afib started on amio.    #Shock, unclear source  #Suspicion of AI  -Pt was hypotensive, no fever, no leukocytosis  -Hb stable, so signs of bleeding  -TTE: EF 45-50% with moderate TR only  -UA negative  -CXR normal  -CTAP negative  -RVP negative, MRSA negative  -Blood cx negative  -Received fluids, not responsive, started on levophed  -After having rapid rate Afib on 2/5, switched to phenylephrine and vasopressin was added, on balbir low dose (improved requirements), off vaso  -Cx taken, started empirically on cefepime, then ora, now ID cs recommending stopping all ABx as no source and cultures all negative  -TSH low 0.22, but free T4 normal so likely sick euthyroid syndrome  -AM cortisol intermediate at 4.8 (inappropriately low) so ordered ACTH level, and will do a low dose ACTH stim test today  -Started on dexa 6 mg, now held for the stim test, will resume stress dose steroids after stim test  -Endo consulted  -Stim test revealed no adrenal gland issue as cortisol level increased appropriately to >18  -2/9 Stop Dexamethasone. Monitor off steroids.   -2/9 Follow-up Endocrinology recommendations.   -2/9 Off pressors. Hemodynamically stable, MAP >65.      #Afib, was in RVR, now rate controlled  -Amio loaded and started on amio drip on 2/5, switched to PO amio   -Received 2 doses of metoprolol 5mg IV each  -Now rate controlled  -On metoprolol 200mg at home, holding now in the setting of hypotension  -EP consulted  -On coumadin at home 5 mg daily (since it's a valvular Afib), holding as INR supra-therapeutic (10.4 -> 7 ->4),   -Ordered coumadin 3.5 mg on 2/8, check INR daily  - Warfarin dose adjusted due to CYP effects with amio    #MARIANNE  #HAGMA  -Crea trended up as per patient from 1.2 baseline, to 3.1 in Jan, and now 5.3 on admission  - Trend daily  -Holding home aldactone, ACEi, entresto and farxiga  -Not euglycemic DKA as BHB negative  -Art inserted for accurate I/O  -On  cc/h as per nephro  -PO HCO3 started  -Midodrine 10mg q8  -KBUS negative  -MM w/u follow up    #HFimpEF  -TTE showed EF 45-50%, mod TR, s/p mitral annuloplasty  -Holding all his GDMT now as pt in shock, would resume progressively when out of shock    #Diet: DASH  #DVT pro: Will resume coumadin as INR within range  #GI pro: Stop GI PPx, no longer on steroids.   #Activity: as tolerated  #Dispo: ICU  #Code status: Full code as per patient    Pending/Follow up:  Warfarin dose adjustments  Monitoring off abx  Endocrinology recommendations.

## 2025-02-09 NOTE — PROGRESS NOTE ADULT - ASSESSMENT
ALLIE TORRE is a 74y man with a medical history significant for valvular AF on warfarin and CHF, who presented initially with subacute worsening of fatigue found to have renal failure on outpatient lab testing, and is now in the critical care unit for acute renal failure.       IMPRESSION:    Borderline adrenal insufficiency  MARIANNE, nonoliguric  AF-RVR  Hypotension   Metabolic acidosis likely 2/2 uremia   Chronic A fib on warfarin  HFmrEF  H/o HTN    PLAN:    CNS: Avoid depressants    HEENT: Oral care    PULMONARY: On RA. HOB @ 45 degrees.  Aspiration precautions.  CXR noted.      CARDIOVASCULAR:   Vasopressors: off Phenylephrine this AM  Increase midodrine 20mg q8  Hold further dexamethasone  TTE this admission without heart failure  Target MAP >65.    Consult EP for significant AF-RVR.  Recommendations appreciated  Amiodarone transitioned to PO  Start metoprolol once off vasopressors consistently.    GI: GI ppx: not indicated. Feeding. Bowel regimen PRN    RENAL: Stop IVF. Check UA, urine Na, urine Cr. Nephrology recommendations appreciated.  Follow up lytes.  Correct as needed.  replace Mg.  no kilgore.    INFECTIOUS DISEASE: Follow up cultures.  Now off Abx given no evidence of infection.  ID consulted, appreciate recommendations    HEMATOLOGICAL:  DVT prophylaxis.  Monitor CBC.  INR subtherapeutic today, monitor daily, resume warfarin, will have pharmacy evaluate.    ENDOCRINE:  Follow up FS.  Insulin protocol if needed. BHB negative.  Started dexamethasone, may have worsening BG.  ACTH stim demonstrated appropriate adrenal response.  Hold further dexamethasone.    Follow-up T3.    Appreciate endocrine recommendations.    MUSCULOSKELETAL: OOBTC    DISPO: SDU  CODE: FULL  LINES: L-IJ CVC

## 2025-02-09 NOTE — PROGRESS NOTE ADULT - SUBJECTIVE AND OBJECTIVE BOX
Patient is a 74y old  Male who presents with a chief complaint of Acute renal failure     (06 Feb 2025 16:16)        Over Night Events:    ACTH stim test showed appropriate adrenal response  a-line not working today  Off vasopressors today  Net negative overnight    ROS:     All ROS are negative except HPI       PHYSICAL EXAM    ICU Vital Signs Last 24 Hrs  T(C): 36.5 (09 Feb 2025 08:00), Max: 37.4 (08 Feb 2025 20:00)  T(F): 97.7 (09 Feb 2025 08:00), Max: 99.3 (08 Feb 2025 20:00)  HR: 82 (09 Feb 2025 08:45) (82 - 113)  BP: 131/54 (09 Feb 2025 08:30) (68/32 - 137/74)  BP(mean): 78 (09 Feb 2025 08:30) (45 - 105)  ABP: 110/91 (09 Feb 2025 08:45) (68/39 - 131/95)  ABP(mean): 98 (09 Feb 2025 08:45) (51 - 122)  RR: 24 (09 Feb 2025 08:45) (16 - 37)  SpO2: 95% (09 Feb 2025 08:45) (93% - 97%)    O2 Parameters below as of 09 Feb 2025 08:00  Patient On (Oxygen Delivery Method): room air            Constitutional: no acute distress, well nourished well developed  Neuro: moving all 4 limbs spontaneously, no facial droop or dysarthria  HEENT: NCAT, anicteric  Neck: no visible lymphadenopathy or goiter  Pulm: no respiratory distress. clear to auscultation bilaterally  Cardiac: extremities appear pink and well-perfused.  regular rhythm and rate, no murmur detected  Abdomen: non-distended  Extremities: no peripheral edema      02-08-25 @ 07:01  -  02-09-25 @ 07:00  --------------------------------------------------------  IN:    Lactated Ringers: 300 mL    Oral Fluid: 320 mL    Phenylephrine: 321.5 mL  Total IN: 941.5 mL    OUT:    Indwelling Catheter - Urethral (mL): 800 mL    Voided (mL): 1200 mL  Total OUT: 2000 mL    Total NET: -1058.5 mL          LABS:                            8.4    11.62 )-----------( 257      ( 09 Feb 2025 04:30 )             26.6                                               02-09    145  |  109  |  28[H]  ----------------------------<  147[H]  4.1   |  27  |  1.3    Ca    7.3[L]      09 Feb 2025 05:55  Mg     1.9     02-09    TPro  5.2[L]  /  Alb  3.3[L]  /  TBili  0.5  /  DBili  x   /  AST  98[H]  /  ALT  132[H]  /  AlkPhos  57  02-09      PT/INR - ( 09 Feb 2025 05:55 )   PT: 15.60 sec;   INR: 1.31 ratio                                                Urinalysis Basic - ( 09 Feb 2025 05:55 )    Color: x / Appearance: x / SG: x / pH: x  Gluc: 147 mg/dL / Ketone: x  / Bili: x / Urobili: x   Blood: x / Protein: x / Nitrite: x   Leuk Esterase: x / RBC: x / WBC x   Sq Epi: x / Non Sq Epi: x / Bacteria: x                                                  LIVER FUNCTIONS - ( 09 Feb 2025 05:55 )  Alb: 3.3 g/dL / Pro: 5.2 g/dL / ALK PHOS: 57 U/L / ALT: 132 U/L / AST: 98 U/L / GGT: x                                                                                                                                       MEDICATIONS  (STANDING):  aMIOdarone    Tablet 200 milliGRAM(s) Oral two times a day  calcium acetate 667 milliGRAM(s) Oral three times a day with meals  chlorhexidine 2% Cloths 1 Application(s) Topical daily  dexAMETHasone  Injectable 6 milliGRAM(s) IV Push every 24 hours  ezetimibe 10 milliGRAM(s) Oral daily  lactated ringers. 1000 milliLiter(s) (150 mL/Hr) IV Continuous <Continuous>  magnesium oxide 400 milliGRAM(s) Oral three times a day with meals  midodrine 20 milliGRAM(s) Oral every 8 hours  pantoprazole    Tablet 40 milliGRAM(s) Oral before breakfast  phenylephrine    Infusion 0.1 MICROgram(s)/kG/Min (1.97 mL/Hr) IV Continuous <Continuous>  rosuvastatin 40 milliGRAM(s) Oral at bedtime  warfarin 3.5 milliGRAM(s) Oral at bedtime    MEDICATIONS  (PRN):  lidocaine 5% Ointment 1 Application(s) Topical four times a day PRN pain at kilgore area

## 2025-02-09 NOTE — PROGRESS NOTE ADULT - ASSESSMENT
# hypotension, hyperkalemia, concern for adrenal insufficiency   - am cortisol at 4.8 done after patient received dexamethasone, expected to be low   - likely symptoms and labs secondary to MARIANNE and diuretics   -30 minutes cortisol post cosyntropin 21 and 60 minutes 22.5 , is a good response , ruling out adrenal insufficiency   - can stop dexamethasone , unless used for other indication        # low TSH , history of thyroid nodules , patient on amiodarone   - TSH 0.22  Ft4 normal   - check TOTAL t3 if normal , can have repeat TFts as outpatient

## 2025-02-10 LAB
% ALBUMIN: 57.7 % — SIGNIFICANT CHANGE UP
% ALPHA 1: 6.4 % — SIGNIFICANT CHANGE UP
% ALPHA 2: 10.9 % — SIGNIFICANT CHANGE UP
% BETA: 10.4 % — SIGNIFICANT CHANGE UP
% GAMMA: 14.6 % — SIGNIFICANT CHANGE UP
ALBUMIN SERPL ELPH-MCNC: 3.1 G/DL — LOW (ref 3.5–5.2)
ALBUMIN SERPL ELPH-MCNC: 3.7 G/DL — SIGNIFICANT CHANGE UP (ref 3.6–5.5)
ALBUMIN/GLOB SERPL ELPH: 1.4 RATIO — SIGNIFICANT CHANGE UP
ALP SERPL-CCNC: 60 U/L — SIGNIFICANT CHANGE UP (ref 30–115)
ALPHA1 GLOB SERPL ELPH-MCNC: 0.4 G/DL — SIGNIFICANT CHANGE UP (ref 0.1–0.4)
ALPHA2 GLOB SERPL ELPH-MCNC: 0.7 G/DL — SIGNIFICANT CHANGE UP (ref 0.5–1)
ALT FLD-CCNC: 197 U/L — HIGH (ref 0–41)
ANION GAP SERPL CALC-SCNC: 8 MMOL/L — SIGNIFICANT CHANGE UP (ref 7–14)
AST SERPL-CCNC: 125 U/L — HIGH (ref 0–41)
B-GLOBULIN SERPL ELPH-MCNC: 0.7 G/DL — SIGNIFICANT CHANGE UP (ref 0.5–1)
BASOPHILS # BLD AUTO: 0.01 K/UL — SIGNIFICANT CHANGE UP (ref 0–0.2)
BASOPHILS NFR BLD AUTO: 0.1 % — SIGNIFICANT CHANGE UP (ref 0–1)
BILIRUB SERPL-MCNC: 0.6 MG/DL — SIGNIFICANT CHANGE UP (ref 0.2–1.2)
BUN SERPL-MCNC: 31 MG/DL — HIGH (ref 10–20)
CALCIUM SERPL-MCNC: 7.2 MG/DL — LOW (ref 8.4–10.5)
CHLORIDE SERPL-SCNC: 107 MMOL/L — SIGNIFICANT CHANGE UP (ref 98–110)
CO2 SERPL-SCNC: 26 MMOL/L — SIGNIFICANT CHANGE UP (ref 17–32)
CREAT SERPL-MCNC: 1.2 MG/DL — SIGNIFICANT CHANGE UP (ref 0.7–1.5)
CULTURE RESULTS: SIGNIFICANT CHANGE UP
CULTURE RESULTS: SIGNIFICANT CHANGE UP
EGFR: 64 ML/MIN/1.73M2 — SIGNIFICANT CHANGE UP
EOSINOPHIL # BLD AUTO: 0.14 K/UL — SIGNIFICANT CHANGE UP (ref 0–0.7)
EOSINOPHIL NFR BLD AUTO: 1.4 % — SIGNIFICANT CHANGE UP (ref 0–8)
GAMMA GLOBULIN: 0.9 G/DL — SIGNIFICANT CHANGE UP (ref 0.6–1.6)
GLUCOSE SERPL-MCNC: 116 MG/DL — HIGH (ref 70–99)
HCT VFR BLD CALC: 25.8 % — LOW (ref 42–52)
HGB BLD-MCNC: 8.1 G/DL — LOW (ref 14–18)
IMM GRANULOCYTES NFR BLD AUTO: 1.6 % — HIGH (ref 0.1–0.3)
INR BLD: 1.4 RATIO — HIGH (ref 0.65–1.3)
INTERPRETATION SERPL IFE-IMP: SIGNIFICANT CHANGE UP
LYMPHOCYTES # BLD AUTO: 1.29 K/UL — SIGNIFICANT CHANGE UP (ref 1.2–3.4)
LYMPHOCYTES # BLD AUTO: 12.6 % — LOW (ref 20.5–51.1)
MAGNESIUM SERPL-MCNC: 1.4 MG/DL — LOW (ref 1.8–2.4)
MCHC RBC-ENTMCNC: 30.3 PG — SIGNIFICANT CHANGE UP (ref 27–31)
MCHC RBC-ENTMCNC: 31.4 G/DL — LOW (ref 32–37)
MCV RBC AUTO: 96.6 FL — HIGH (ref 80–94)
MONOCYTES # BLD AUTO: 0.64 K/UL — HIGH (ref 0.1–0.6)
MONOCYTES NFR BLD AUTO: 6.3 % — SIGNIFICANT CHANGE UP (ref 1.7–9.3)
NEUTROPHILS # BLD AUTO: 7.96 K/UL — HIGH (ref 1.4–6.5)
NEUTROPHILS NFR BLD AUTO: 78 % — HIGH (ref 42.2–75.2)
NRBC # BLD: 0 /100 WBCS — SIGNIFICANT CHANGE UP (ref 0–0)
NRBC BLD-RTO: 0 /100 WBCS — SIGNIFICANT CHANGE UP (ref 0–0)
PLATELET # BLD AUTO: 236 K/UL — SIGNIFICANT CHANGE UP (ref 130–400)
PMV BLD: 11.1 FL — HIGH (ref 7.4–10.4)
POTASSIUM SERPL-MCNC: 3.9 MMOL/L — SIGNIFICANT CHANGE UP (ref 3.5–5)
POTASSIUM SERPL-SCNC: 3.9 MMOL/L — SIGNIFICANT CHANGE UP (ref 3.5–5)
PROT PATTERN SERPL ELPH-IMP: SIGNIFICANT CHANGE UP
PROT SERPL-MCNC: 4.7 G/DL — LOW (ref 6–8)
PROT SERPL-MCNC: 6.4 G/DL — SIGNIFICANT CHANGE UP (ref 6–8.3)
PROTHROM AB SERPL-ACNC: 16.6 SEC — HIGH (ref 9.95–12.87)
RBC # BLD: 2.67 M/UL — LOW (ref 4.7–6.1)
RBC # FLD: 12.5 % — SIGNIFICANT CHANGE UP (ref 11.5–14.5)
SODIUM SERPL-SCNC: 141 MMOL/L — SIGNIFICANT CHANGE UP (ref 135–146)
SPECIMEN SOURCE: SIGNIFICANT CHANGE UP
SPECIMEN SOURCE: SIGNIFICANT CHANGE UP
T3 SERPL-MCNC: 58 NG/DL — LOW (ref 80–200)
WBC # BLD: 10.2 K/UL — SIGNIFICANT CHANGE UP (ref 4.8–10.8)
WBC # FLD AUTO: 10.2 K/UL — SIGNIFICANT CHANGE UP (ref 4.8–10.8)

## 2025-02-10 PROCEDURE — 99233 SBSQ HOSP IP/OBS HIGH 50: CPT

## 2025-02-10 RX ORDER — SODIUM CHLORIDE 9 G/1000ML
500 INJECTION, SOLUTION INTRAVENOUS ONCE
Refills: 0 | Status: COMPLETED | OUTPATIENT
Start: 2025-02-10 | End: 2025-02-10

## 2025-02-10 RX ORDER — SODIUM CHLORIDE 9 G/1000ML
500 INJECTION, SOLUTION INTRAVENOUS ONCE
Refills: 0 | Status: DISCONTINUED | OUTPATIENT
Start: 2025-02-10 | End: 2025-02-10

## 2025-02-10 RX ORDER — METOPROLOL SUCCINATE 50 MG/1
12.5 TABLET, EXTENDED RELEASE ORAL
Refills: 0 | Status: DISCONTINUED | OUTPATIENT
Start: 2025-02-10 | End: 2025-02-13

## 2025-02-10 RX ORDER — MAGNESIUM SULFATE 500 MG/ML
2 SYRINGE (ML) INJECTION ONCE
Refills: 0 | Status: COMPLETED | OUTPATIENT
Start: 2025-02-10 | End: 2025-02-10

## 2025-02-10 RX ORDER — MAGNESIUM SULFATE 500 MG/ML
2 SYRINGE (ML) INJECTION
Refills: 0 | Status: COMPLETED | OUTPATIENT
Start: 2025-02-10 | End: 2025-02-10

## 2025-02-10 RX ORDER — SODIUM CHLORIDE 9 G/1000ML
250 INJECTION, SOLUTION INTRAVENOUS ONCE
Refills: 0 | Status: COMPLETED | OUTPATIENT
Start: 2025-02-10 | End: 2025-02-10

## 2025-02-10 RX ORDER — MAGNESIUM SULFATE 500 MG/ML
2 SYRINGE (ML) INJECTION
Refills: 0 | Status: DISCONTINUED | OUTPATIENT
Start: 2025-02-10 | End: 2025-02-10

## 2025-02-10 RX ADMIN — AMIODARONE HYDROCHLORIDE 200 MILLIGRAM(S): 50 INJECTION, SOLUTION INTRAVENOUS at 05:16

## 2025-02-10 RX ADMIN — Medication 667 MILLIGRAM(S): at 12:21

## 2025-02-10 RX ADMIN — Medication 400 MILLIGRAM(S): at 08:12

## 2025-02-10 RX ADMIN — MIDODRINE HYDROCHLORIDE 20 MILLIGRAM(S): 5 TABLET ORAL at 21:05

## 2025-02-10 RX ADMIN — Medication 1 APPLICATION(S): at 11:45

## 2025-02-10 RX ADMIN — Medication 400 MILLIGRAM(S): at 12:21

## 2025-02-10 RX ADMIN — Medication 25 GRAM(S): at 11:46

## 2025-02-10 RX ADMIN — Medication 5 MILLIGRAM(S): at 21:05

## 2025-02-10 RX ADMIN — Medication 25 GRAM(S): at 15:08

## 2025-02-10 RX ADMIN — MIDODRINE HYDROCHLORIDE 20 MILLIGRAM(S): 5 TABLET ORAL at 05:15

## 2025-02-10 RX ADMIN — SODIUM CHLORIDE 1000 MILLILITER(S): 9 INJECTION, SOLUTION INTRAVENOUS at 11:00

## 2025-02-10 RX ADMIN — SODIUM CHLORIDE 6000 MILLILITER(S): 9 INJECTION, SOLUTION INTRAVENOUS at 10:59

## 2025-02-10 RX ADMIN — Medication 25 GRAM(S): at 13:52

## 2025-02-10 RX ADMIN — MIDODRINE HYDROCHLORIDE 20 MILLIGRAM(S): 5 TABLET ORAL at 13:53

## 2025-02-10 RX ADMIN — Medication 667 MILLIGRAM(S): at 08:12

## 2025-02-10 RX ADMIN — EZETIMIBE 10 MILLIGRAM(S): 10 TABLET ORAL at 11:47

## 2025-02-10 RX ADMIN — Medication 25 GRAM(S): at 06:39

## 2025-02-10 RX ADMIN — SODIUM CHLORIDE 3000 MILLILITER(S): 9 INJECTION, SOLUTION INTRAVENOUS at 11:44

## 2025-02-10 RX ADMIN — METOPROLOL SUCCINATE 12.5 MILLIGRAM(S): 50 TABLET, EXTENDED RELEASE ORAL at 18:25

## 2025-02-10 RX ADMIN — Medication 400 MILLIGRAM(S): at 18:25

## 2025-02-10 RX ADMIN — SODIUM CHLORIDE 1000 MILLILITER(S): 9 INJECTION, SOLUTION INTRAVENOUS at 12:22

## 2025-02-10 RX ADMIN — Medication 667 MILLIGRAM(S): at 18:25

## 2025-02-10 NOTE — PHARMACOTHERAPY INTERVENTION NOTE - COMMENTS
74yMale    Indication: afib  INR Goal: 2-3  Home Dose: 5mg daily as per dr Mejia  Bridge Therapy:    Current Medications:  calcium acetate 667 milliGRAM(s) Oral three times a day with meals  chlorhexidine 2% Cloths 1 Application(s) Topical daily  ezetimibe 10 milliGRAM(s) Oral daily  lidocaine 5% Ointment 1 Application(s) Topical four times a day PRN  magnesium oxide 400 milliGRAM(s) Oral three times a day with meals  magnesium sulfate  IVPB 2 Gram(s) IV Intermittent every 2 hours  metoprolol tartrate 12.5 milliGRAM(s) Oral two times a day  midodrine 20 milliGRAM(s) Oral every 8 hours  warfarin 5 milliGRAM(s) Oral once      hemoglobin 8.1 g/dL (02-10-25 @ 04:30)    hematocrit 25.8 % (02-10-25 @ 04:30)    PLT: 236 K/uL (02-10-25 @ 04:30)    GFR:64 mL/min/1.73m2 (02-10-25 @ 04:30)        Drug Interactions: cefepime   IVPB   100 mL/Hr (02-05-25 @ 07:52)    cefTRIAXone   IVPB   100 mL/Hr (02-06-25 @ 13:15)    meropenem  IVPB   100 mL/Hr (02-06-25 @ 01:18)    meropenem  IVPB   100 mL/Hr (02-06-25 @ 05:32)    vancomycin  IVPB   300 mL/Hr (02-05-25 @ 08:38)    vancomycin  IVPB   250 mL/Hr (02-06-25 @ 13:49)        INR trend  10.47 ratio (02-04-25 @ 23:35)  7.88 ratio (02-05-25 @ 06:20)  4.00 ratio (02-06-25 @ 04:50)  2.14 ratio (02-07-25 @ 06:30)  1.48 ratio (02-08-25 @ 05:37)  1.31 ratio (02-09-25 @ 05:55)  1.40 ratio (02-10-25 @ 04:30)      Warfarin administration history:  warfarin: 3.5 milliGRAM(s) (02-07-25 @ 21:13)  warfarin: 3.5 milliGRAM(s) (02-08-25 @ 21:07)  warfarin: 3.5 milliGRAM(s) (02-09-25 @ 21:19)          1. INR today is:         1.4      [  x ] below goal ----- This is likely due to held doses of warfarin.                                             [   ] at goal                                            [   ] above goal ------ This is likely due to        2. MD ordered warfarin 5 mg po x1. Agree with team's decision.     3. Obtain INR tomorrow AM

## 2025-02-10 NOTE — CHART NOTE - NSCHARTNOTEFT_GEN_A_CORE
CCU DOWN GRADE NOTE  From: CCU  To: SDU    HPI:  Patient is a 74-year-old male with past medical history of hypertension, dyslipidemia, mitral valve regurg, A-fib on Coumadin,HFrEF with an EF of 45% in december , and status post AICD presenting for weakness. Patient endorses persistent, complaining generalized weakness, fatigue, anorexia and poor p.o. intake for the past 5-6 weeks. Patient also reports a significant weight loss. Patient  states he had recent blood work which showed worsening kidney function, creatinine was 3.1 in  january up from a baseline of 1,2 in december and currently creatinine in 5.3.     HOSPITAL COURSE:  CCU:  A 74-year-old male with a history of hypertension, dyslipidemia, mitral valve regurgitation, atrial fibrillation on Coumadin, heart failure with reduced ejection fraction (HFrEF), and an AICD presented with weakness, fatigue, anorexia, poor oral intake, significant weight loss, and worsening acute kidney injury (MARIANNE) over several weeks.  His creatinine edvin from 1.2 to 5.3.  He was hypotensive, requiring pressors (levophed, phenylephrine, vasopressin, and low-dose norepinephrine).  The source of shock was unclear.  Infections were ruled out with negative cultures and imaging. He developed rapid atrial fibrillation and was started on amiodarone. His Coumadin was held due to a supratherapeutic INR and later resumed with dose adjustment per CYP cohepatic meabolism.  He was found to have sick euthyroid syndrome and a low AM cortisol, but an ACTH stimulation test ruled out adrenal insufficiency. Steroids were briefly started then discontinued. Endocrinology still following. His home medications for heart failure and hypertension were held due to hypotension. He was also started on bicarbonate for high anion gap metabolic acidosis (HAGMA), now discontinued. His hemodynamics stabilized, and he was weaned off pressors. Patient is now hemodynamically stable, off pressors, unremarkable physical exam, and stable for downgrade to SDU approved by attending.    Patient is a 74-year-old male with past medical history of hypertension, dyslipidemia, mitral valve regurg, A-fib on Coumadin,HFrEF with an EF of 45% in december , and status post AICD presenting for weakness. Patient endorses persistent, complaining generalized weakness, fatigue, anorexia and poor p.o. intake for the past 5-6 weeks. Patient also reports a significant weight loss. Patient  states he had recent blood work which showed worsening kidney function, creatinine was 3.1 in  january up from a baseline of 1,2 in december and currently creatinine in 5.3.   Patient also hypotensive on pressors now, suspicion of AI, had rapid afib started on amio.    #Shock, unclear source  #Suspicion of AI  -Pt was hypotensive, no fever, no leukocytosis  -Hb stable, so signs of bleeding  -TTE: EF 45-50% with moderate TR only  -UA negative  -CXR normal  -CTAP negative  -RVP negative, MRSA negative  -Blood cx negative  -Received fluids, not responsive, started on levophed  -After having rapid rate Afib on 2/5, switched to phenylephrine and vasopressin was added, on balbir low dose (improved requirements), off vaso since 2 days now  -Cx taken, started empirically on cefepime, then ora, now ID cs recommending stopping all ABx as no source and cultures all negative  -TSH low 0.22, but free T4 normal so likely sick euthyroid syndrome, F/U T3 level  -AM cortisol intermediate at 4.8 (inappropriately low) so ordered ACTH level and did stim test  -Started on dexa 6 mg, initially then was stopped  -Endo consulted  -Stim test revealed no adrenal gland issue as cortisol level increased appropriately to >18  -2/9 Stop Dexamethasone. Monitor off steroids.   -2/9 Follow-up Endocrinology recommendations.   -2/9 Off pressors. Hemodynamically stable, MAP >65.      #Afib, was in RVR, now rate controlled  -Amio loaded and started on amio drip on 2/5, switched to PO amio then stopped on 2/10 (as per EP recs)  -Now rate controlled  -On metoprolol 200mg at home, resumed now at 12.5 mg BID  -On coumadin at home 5 mg daily (since it's a valvular Afib), were holding it initially for high INR but now resumed  -Ordered coumadin 3.5 mg on 2/8 and 2/9, will f/u today with pharmacy  -Amio now stopped so no need for adjusting warfarin dose anymore    #MARIANNE - resolved  #HAGMA  -Crea trended up as per patient from 1.2 baseline, to 3.1 in Jan, and now 5.3 on admission  -During stay it trended down back to baseline (1.2-1.3)  - Trend daily  -Holding home aldactone, ACEi, entresto and farxiga, to be resumed progressively  -Not euglycemic DKA as BHB negative  -Art inserted for accurate I/O, removed successfully  -PO HCO3 started, now stopped  -Midodrine 20mg q8  -KBUS negative  -MM w/u follow up    #HFimpEF  -TTE showed EF 45-50%, mod TR, s/p mitral annuloplasty  -Holding all his GDMT now as pt was in shock, would resume progressively     #Diet: DASH  #DVT pro: coumadin as per INR  #GI pro: Stop GI PPx, no longer on steroids.   #Activity: as tolerated  #Dispo: SDU  #Code status: Full code as per patient    Pending/Follow up:  Warfarin dose adjustments  Monitoring off abx  Endocrinology recommendations.  Resume GDMT (today added metoprolol) CCU DOWN GRADE NOTE  From: CCU  To: SDU    HPI:  Patient is a 74-year-old male with past medical history of hypertension, dyslipidemia, mitral valve regurg, A-fib on Coumadin,HFrEF with an EF of 45% in december , and status post AICD presenting for weakness. Patient endorses persistent, complaining generalized weakness, fatigue, anorexia and poor p.o. intake for the past 5-6 weeks. Patient also reports a significant weight loss. Patient  states he had recent blood work which showed worsening kidney function, creatinine was 3.1 in  january up from a baseline of 1,2 in december and currently creatinine in 5.3.     HOSPITAL COURSE:  CCU:  A 74-year-old male with a history of hypertension, dyslipidemia, mitral valve regurgitation, atrial fibrillation on Coumadin, heart failure with reduced ejection fraction (HFrEF), and an AICD presented with weakness, fatigue, anorexia, poor oral intake, significant weight loss, and worsening acute kidney injury (MARIANNE) over several weeks.  His creatinine edvin from 1.2 to 5.3.  He was hypotensive, requiring pressors (levophed, phenylephrine, vasopressin, and low-dose norepinephrine).  The source of shock was unclear.  Infections were ruled out with negative cultures and imaging. He developed rapid atrial fibrillation and was started on amiodarone. His Coumadin was held due to a supratherapeutic INR and later resumed with dose adjustment per CYP cohepatic meabolism.  He was found to have sick euthyroid syndrome and a low AM cortisol, but an ACTH stimulation test ruled out adrenal insufficiency. Steroids were briefly started then discontinued. Endocrinology still following. His home medications for heart failure and hypertension were held due to hypotension. He was also started on bicarbonate for high anion gap metabolic acidosis (HAGMA), now discontinued. His hemodynamics stabilized, and he was weaned off pressors. Patient is now hemodynamically stable, off pressors, unremarkable physical exam, and stable for downgrade to SDU approved by attending.    Patient is a 74-year-old male with past medical history of hypertension, dyslipidemia, mitral valve regurg, A-fib on Coumadin,HFrEF with an EF of 45% in december , and status post AICD presenting for weakness. Patient endorses persistent, complaining generalized weakness, fatigue, anorexia and poor p.o. intake for the past 5-6 weeks. Patient also reports a significant weight loss. Patient  states he had recent blood work which showed worsening kidney function, creatinine was 3.1 in  january up from a baseline of 1,2 in december and currently creatinine in 5.3.   Patient was also hypotensive, suspicion of AI, had rapid afib started on amio.    #Shock, unclear source - now resolved  #Suspicion of AI - now ruled out  -Pt was hypotensive, no fever, no leukocytosis  -Hb stable, so signs of bleeding  -TTE: EF 45-50% with moderate TR only  -UA negative  -CXR normal  -CTAP negative  -RVP negative, MRSA negative  -Blood cx negative  -Received fluids, not responsive, started on levophed  -After having rapid rate Afib on 2/5, switched to phenylephrine and vasopressin was added, on balbir low dose (improved requirements), off vaso since 2 days now  -Cx taken, started empirically on cefepime, then ora, now ID cs recommending stopping all ABx as no source and cultures all negative  -TSH low 0.22, but free T4 normal so likely sick euthyroid syndrome, F/U T3 level  -AM cortisol intermediate at 4.8 (inappropriately low) so ordered ACTH level and did stim test  -Started on dexa 6 mg, initially then was stopped  -Endo consulted  -Stim test revealed no adrenal gland issue as cortisol level increased appropriately to >18  -2/9 Stop Dexamethasone. Monitor off steroids.   -2/9 Follow-up Endocrinology recommendations.   -2/9 Off pressors. Hemodynamically stable, MAP >65.      #Afib, was in RVR, now rate controlled  -Amio loaded and started on amio drip on 2/5, switched to PO amio then stopped on 2/10 (as per EP recs)  -Now rate controlled  -On metoprolol 200mg at home, resumed now at 12.5 mg BID  -On coumadin at home 5 mg daily (since it's a valvular Afib), were holding it initially for high INR but now resumed  -Ordered coumadin 3.5 mg on 2/8 and 2/9, today gave 5 mg  -F/u INR daily  -Amio now stopped so no need for adjusting warfarin dose anymore    #MARIANNE - resolved  #HAGMA  -Crea trended up as per patient from 1.2 baseline, to 3.1 in Jan, and now 5.3 on admission  -During stay it trended down back to baseline (1.2-1.3)  - Trend daily  -Holding home aldactone, ACEi, entresto and farxiga, to be resumed progressively  -Not euglycemic DKA as BHB negative  -Art inserted for accurate I/O, removed successfully  -PO HCO3 started, now stopped  -Midodrine 20mg q8  -KBUS negative  -MM w/u follow up    #HFimpEF  -TTE showed EF 45-50%, mod TR, s/p mitral annuloplasty  -Holding all his GDMT now as pt was in shock, would resume progressively     #Diet: DASH  #DVT pro: coumadin as per INR  #GI pro: Stop GI PPx, no longer on steroids.   #Activity: as tolerated  #Dispo: SDU  #Code status: Full code as per patient    Pending/Follow up:  Warfarin dose adjustments  Monitoring off abx  Endocrinology recommendations.  Resume GDMT (today added metoprolol)

## 2025-02-10 NOTE — PROGRESS NOTE ADULT - ASSESSMENT
ALLIE TORRE is a 74y man with a medical history significant for valvular AF on warfarin and CHF, who presented initially with subacute worsening of fatigue found to have renal failure on outpatient lab testing, and is now in the critical care unit for acute renal failure.       IMPRESSION:    Borderline adrenal insufficiency  MARIANNE, nonoliguric  AF-RVR  Hypotension   Metabolic acidosis likely 2/2 uremia   Chronic A fib on warfarin  HFmrEF  H/o HTN    PLAN:    CNS: Avoid depressants    HEENT: Oral care    PULMONARY: On RA. HOB @ 45 degrees.  Aspiration precautions.       CARDIOVASCULAR:   off p[ressors for 48 hr     midodrine 20mg q8  Hold further dexamethasone  TTE this admission without heart failure  Target MAP >65.    follow EP   rate now better controlled     Recommendations appreciated  Amiodarone transitioned to PO  Start metoprolol once off vasopressors consistently.    GI: GI ppx: not indicated. Feeding. Bowel regimen PRN    RENAL: Stop IVF. Check UA, urine Na, urine Cr. Nephrology recommendations appreciated.  Follow up lytes.  Correct as needed.  replace Mg.  no kilgore.    INFECTIOUS DISEASE: Follow up cultures.  Now off Abx given no evidence of infection.  ID consulted, appreciate recommendations    HEMATOLOGICAL:  DVT prophylaxis.  Monitor CBC.  INR subtherapeutic today, monitor daily, resume warfarin, will have pharmacy evaluate.    ENDOCRINE:  Follow up FS.  Insulin protocol if needed. BHB negative.  Started dexamethasone, may have worsening BG.  ACTH stim demonstrated appropriate adrenal response.  Hold further dexamethasone.    Follow-up T3.    Appreciate endocrine recommendations.    MUSCULOSKELETAL: OOBTC    DISPO:  keep SDU for now   PT / rehab   CODE: FULL  LINES: L-IJ CVC

## 2025-02-10 NOTE — PROGRESS NOTE ADULT - SUBJECTIVE AND OBJECTIVE BOX
Patient is a 74y old  Male who presents with a chief complaint of Acute renal failure     (06 Feb 2025 16:16)      Over Night Events:  Patient seen and examined.   pending bed in SDU epsiodes of dizziness / orthostatic hypotension   chart reviewed   off pressors for 48 hrs   no acute events     ROS:  See HPI    PHYSICAL EXAM    ICU Vital Signs Last 24 Hrs  T(C): 36.7 (10 Feb 2025 04:00), Max: 37.1 (10 Feb 2025 00:00)  T(F): 98.1 (10 Feb 2025 04:00), Max: 98.8 (10 Feb 2025 00:00)  HR: 85 (10 Feb 2025 08:00) (72 - 108)  BP: 134/64 (10 Feb 2025 08:00) (84/51 - 150/70)  BP(mean): 91 (10 Feb 2025 08:00) (60 - 101)  ABP: 74/55 (09 Feb 2025 10:30) (58/49 - 110/91)  ABP(mean): 63 (09 Feb 2025 10:30) (51 - 98)  RR: 19 (10 Feb 2025 08:00) (17 - 40)  SpO2: 97% (10 Feb 2025 08:00) (90% - 99%)    O2 Parameters below as of 10 Feb 2025 08:00  Patient On (Oxygen Delivery Method): room air             General: awake   HEENT:        shorty        Lymph Nodes: NO cervical LN   Lungs: Bilateral BS  Cardiovascular: Regular   Abdomen: Soft, Positive BS  Extremities: No clubbing   Skin: warm   Neurological: no focal   Musculoskeletal: move all ext     I&O's Detail    09 Feb 2025 07:01  -  10 Feb 2025 07:00  --------------------------------------------------------  IN:    IV PiggyBack: 50 mL    Oral Fluid: 420 mL    Phenylephrine: 8 mL  Total IN: 478 mL    OUT:    Voided (mL): 1750 mL  Total OUT: 1750 mL    Total NET: -1272 mL          LABS:                          8.1    10.20 )-----------( 236      ( 10 Feb 2025 04:30 )             25.8         10 Feb 2025 04:30    141    |  107    |  31     ----------------------------<  116    3.9     |  26     |  1.2      Ca    7.2        10 Feb 2025 04:30  Mg     1.4       10 Feb 2025 04:30    TPro  4.7    /  Alb  3.1    /  TBili  0.6    /  DBili  x      /  AST  125    /  ALT  197    /  AlkPhos  60     10 Feb 2025 04:30  Amylase x     lipase x                                                 PT/INR - ( 10 Feb 2025 04:30 )   PT: 16.60 sec;   INR: 1.40 ratio                                                Urinalysis Basic - ( 10 Feb 2025 04:30 )    Color: x / Appearance: x / SG: x / pH: x  Gluc: 116 mg/dL / Ketone: x  / Bili: x / Urobili: x   Blood: x / Protein: x / Nitrite: x   Leuk Esterase: x / RBC: x / WBC x   Sq Epi: x / Non Sq Epi: x / Bacteria: x                                                                                                                                                     MEDICATIONS  (STANDING):  aMIOdarone    Tablet 200 milliGRAM(s) Oral two times a day  calcium acetate 667 milliGRAM(s) Oral three times a day with meals  chlorhexidine 2% Cloths 1 Application(s) Topical daily  ezetimibe 10 milliGRAM(s) Oral daily  magnesium oxide 400 milliGRAM(s) Oral three times a day with meals  midodrine 20 milliGRAM(s) Oral every 8 hours  phenylephrine    Infusion 0.1 MICROgram(s)/kG/Min (1.97 mL/Hr) IV Continuous <Continuous>  rosuvastatin 40 milliGRAM(s) Oral at bedtime    MEDICATIONS  (PRN):  lidocaine 5% Ointment 1 Application(s) Topical four times a day PRN pain at kilgore area          Xrays:     ECHO:  [facility] ICU:

## 2025-02-11 LAB
ALBUMIN SERPL ELPH-MCNC: 3.1 G/DL — LOW (ref 3.5–5.2)
ALP SERPL-CCNC: 67 U/L — SIGNIFICANT CHANGE UP (ref 30–115)
ALT FLD-CCNC: 219 U/L — HIGH (ref 0–41)
ANION GAP SERPL CALC-SCNC: 10 MMOL/L — SIGNIFICANT CHANGE UP (ref 7–14)
AST SERPL-CCNC: 113 U/L — HIGH (ref 0–41)
BASOPHILS # BLD AUTO: 0.02 K/UL — SIGNIFICANT CHANGE UP (ref 0–0.2)
BASOPHILS NFR BLD AUTO: 0.2 % — SIGNIFICANT CHANGE UP (ref 0–1)
BILIRUB SERPL-MCNC: 0.7 MG/DL — SIGNIFICANT CHANGE UP (ref 0.2–1.2)
BUN SERPL-MCNC: 28 MG/DL — HIGH (ref 10–20)
CALCIUM SERPL-MCNC: 7.4 MG/DL — LOW (ref 8.4–10.5)
CHLORIDE SERPL-SCNC: 109 MMOL/L — SIGNIFICANT CHANGE UP (ref 98–110)
CO2 SERPL-SCNC: 23 MMOL/L — SIGNIFICANT CHANGE UP (ref 17–32)
CREAT SERPL-MCNC: 1.1 MG/DL — SIGNIFICANT CHANGE UP (ref 0.7–1.5)
EGFR: 71 ML/MIN/1.73M2 — SIGNIFICANT CHANGE UP
EOSINOPHIL # BLD AUTO: 0.46 K/UL — SIGNIFICANT CHANGE UP (ref 0–0.7)
EOSINOPHIL NFR BLD AUTO: 4.8 % — SIGNIFICANT CHANGE UP (ref 0–8)
GLUCOSE SERPL-MCNC: 73 MG/DL — SIGNIFICANT CHANGE UP (ref 70–99)
HCT VFR BLD CALC: 28.7 % — LOW (ref 42–52)
HGB BLD-MCNC: 8.9 G/DL — LOW (ref 14–18)
IMM GRANULOCYTES NFR BLD AUTO: 1.4 % — HIGH (ref 0.1–0.3)
INR BLD: 1.35 RATIO — HIGH (ref 0.65–1.3)
LYMPHOCYTES # BLD AUTO: 2.19 K/UL — SIGNIFICANT CHANGE UP (ref 1.2–3.4)
LYMPHOCYTES # BLD AUTO: 23.1 % — SIGNIFICANT CHANGE UP (ref 20.5–51.1)
MAGNESIUM SERPL-MCNC: 2 MG/DL — SIGNIFICANT CHANGE UP (ref 1.8–2.4)
MCHC RBC-ENTMCNC: 30.4 PG — SIGNIFICANT CHANGE UP (ref 27–31)
MCHC RBC-ENTMCNC: 31 G/DL — LOW (ref 32–37)
MCV RBC AUTO: 98 FL — HIGH (ref 80–94)
MONOCYTES # BLD AUTO: 0.72 K/UL — HIGH (ref 0.1–0.6)
MONOCYTES NFR BLD AUTO: 7.6 % — SIGNIFICANT CHANGE UP (ref 1.7–9.3)
NEUTROPHILS # BLD AUTO: 5.98 K/UL — SIGNIFICANT CHANGE UP (ref 1.4–6.5)
NEUTROPHILS NFR BLD AUTO: 62.9 % — SIGNIFICANT CHANGE UP (ref 42.2–75.2)
NRBC # BLD: 0 /100 WBCS — SIGNIFICANT CHANGE UP (ref 0–0)
NRBC BLD-RTO: 0 /100 WBCS — SIGNIFICANT CHANGE UP (ref 0–0)
PLATELET # BLD AUTO: 215 K/UL — SIGNIFICANT CHANGE UP (ref 130–400)
PMV BLD: 10.8 FL — HIGH (ref 7.4–10.4)
POTASSIUM SERPL-MCNC: 4.9 MMOL/L — SIGNIFICANT CHANGE UP (ref 3.5–5)
POTASSIUM SERPL-SCNC: 4.9 MMOL/L — SIGNIFICANT CHANGE UP (ref 3.5–5)
PROT SERPL-MCNC: 4.7 G/DL — LOW (ref 6–8)
PROTHROM AB SERPL-ACNC: 16 SEC — HIGH (ref 9.95–12.87)
RBC # BLD: 2.93 M/UL — LOW (ref 4.7–6.1)
RBC # FLD: 12.5 % — SIGNIFICANT CHANGE UP (ref 11.5–14.5)
SODIUM SERPL-SCNC: 142 MMOL/L — SIGNIFICANT CHANGE UP (ref 135–146)
WBC # BLD: 9.5 K/UL — SIGNIFICANT CHANGE UP (ref 4.8–10.8)
WBC # FLD AUTO: 9.5 K/UL — SIGNIFICANT CHANGE UP (ref 4.8–10.8)

## 2025-02-11 PROCEDURE — 99233 SBSQ HOSP IP/OBS HIGH 50: CPT

## 2025-02-11 RX ORDER — MIDODRINE HYDROCHLORIDE 5 MG/1
10 TABLET ORAL EVERY 8 HOURS
Refills: 0 | Status: DISCONTINUED | OUTPATIENT
Start: 2025-02-11 | End: 2025-02-12

## 2025-02-11 RX ORDER — SODIUM CHLORIDE 9 G/1000ML
1000 INJECTION, SOLUTION INTRAVENOUS ONCE
Refills: 0 | Status: COMPLETED | OUTPATIENT
Start: 2025-02-11 | End: 2025-02-11

## 2025-02-11 RX ORDER — SODIUM CHLORIDE 9 G/1000ML
500 INJECTION, SOLUTION INTRAVENOUS ONCE
Refills: 0 | Status: COMPLETED | OUTPATIENT
Start: 2025-02-11 | End: 2025-02-11

## 2025-02-11 RX ADMIN — METOPROLOL SUCCINATE 12.5 MILLIGRAM(S): 50 TABLET, EXTENDED RELEASE ORAL at 05:04

## 2025-02-11 RX ADMIN — Medication 400 MILLIGRAM(S): at 16:57

## 2025-02-11 RX ADMIN — SODIUM CHLORIDE 500 MILLILITER(S): 9 INJECTION, SOLUTION INTRAVENOUS at 16:27

## 2025-02-11 RX ADMIN — EZETIMIBE 10 MILLIGRAM(S): 10 TABLET ORAL at 11:01

## 2025-02-11 RX ADMIN — Medication 667 MILLIGRAM(S): at 11:42

## 2025-02-11 RX ADMIN — Medication 667 MILLIGRAM(S): at 16:58

## 2025-02-11 RX ADMIN — MIDODRINE HYDROCHLORIDE 10 MILLIGRAM(S): 5 TABLET ORAL at 19:28

## 2025-02-11 RX ADMIN — Medication 400 MILLIGRAM(S): at 07:43

## 2025-02-11 RX ADMIN — Medication 40 MILLIGRAM(S): at 14:28

## 2025-02-11 RX ADMIN — Medication 667 MILLIGRAM(S): at 07:43

## 2025-02-11 RX ADMIN — Medication 5 MILLIGRAM(S): at 22:29

## 2025-02-11 RX ADMIN — Medication 400 MILLIGRAM(S): at 11:42

## 2025-02-11 RX ADMIN — SODIUM CHLORIDE 1000 MILLILITER(S): 9 INJECTION, SOLUTION INTRAVENOUS at 09:38

## 2025-02-11 RX ADMIN — Medication 1 APPLICATION(S): at 11:02

## 2025-02-11 RX ADMIN — MIDODRINE HYDROCHLORIDE 20 MILLIGRAM(S): 5 TABLET ORAL at 13:16

## 2025-02-11 RX ADMIN — MIDODRINE HYDROCHLORIDE 10 MILLIGRAM(S): 5 TABLET ORAL at 22:30

## 2025-02-11 RX ADMIN — MIDODRINE HYDROCHLORIDE 20 MILLIGRAM(S): 5 TABLET ORAL at 05:04

## 2025-02-11 RX ADMIN — METOPROLOL SUCCINATE 12.5 MILLIGRAM(S): 50 TABLET, EXTENDED RELEASE ORAL at 22:15

## 2025-02-11 NOTE — PROGRESS NOTE ADULT - SUBJECTIVE AND OBJECTIVE BOX
PADMAALLIE AGUILAR  74y Male    CHIEF COMPLAINT:    Patient is a 74y old  Male who presents with a chief complaint of Shock (2025 09:14)    INTERVAL HPI/OVERNIGHT EVENTS:    Patient seen and examined. No acute events overnight. Downgraded from MICU, BP still bordelrine     ROS: All other systems are negative.    Vital Signs:    T(F): 98.4 (25 @ 07:54), Max: 99 (02-10-25 @ 16:00)  HR: 89 (25 @ 07:54) (73 - 95)  BP: 118/55 (25 @ 07:54) (88/52 - 122/64)  RR: 20 (25 @ 07:54) (18 - 34)  SpO2: 100% (25 @ 07:54) (84% - 100%)    10 Feb 2025 07:01  -  2025 07:00  --------------------------------------------------------  IN: 1430 mL / OUT: 3000 mL / NET: -1570 mL    Daily Weight in k (2025 00:00)    PHYSICAL EXAM:    GENERAL:  NAD  SKIN: No rashes or lesions  HEENT: Atraumatic. Normocephalic.   NECK: Supple, No JVD.    PULMONARY: CTA B/L. No wheezing. No rales  CVS: Normal S1, S2. Rate and Rhythm are regular   ABDOMEN/GI: Soft, Nontender, Nondistended   MSK:  No clubbing or cyanosis   NEUROLOGIC:  Moves all extremities   PSYCH: Alert & oriented x 3, normal affect    Consultant(s) Notes Reviewed:  [x ] YES  [ ] NO  Care Discussed with Consultants/Other Providers [ x] YES  [ ] NO    LABS:                        8.9    9.50  )-----------( 215      ( 2025 06:14 )             28.7     142  |  109  |  28[H]  ----------------------------<  73  4.9   |  23  |  1.1    Ca    7.4[L]      2025 06:14  Mg     2.0         TPro  4.7[L]  /  Alb  3.1[L]  /  TBili  0.7  /  DBili  x   /  AST  113[H]  /  ALT  219[H]  /  AlkPhos  67      PT/INR - ( 2025 06:14 )   PT: 16.00 sec;   INR: 1.35 ratio      RADIOLOGY & ADDITIONAL TESTS:  Imaging or report Personally Reviewed:  [x] YES  [ ] NO  EKG reviewed: [x] YES  [ ] NO    Medications:  Standing  calcium acetate 667 milliGRAM(s) Oral three times a day with meals  chlorhexidine 2% Cloths 1 Application(s) Topical daily  ezetimibe 10 milliGRAM(s) Oral daily  magnesium oxide 400 milliGRAM(s) Oral three times a day with meals  metoprolol tartrate 12.5 milliGRAM(s) Oral two times a day  midodrine 20 milliGRAM(s) Oral every 8 hours    PRN Meds  lidocaine 5% Ointment 1 Application(s) Topical four times a day PRN

## 2025-02-11 NOTE — PROGRESS NOTE ADULT - ASSESSMENT
Patient is a 74-year-old male with past medical history of hypertension, dyslipidemia, mitral valve regurg, A-fib on Coumadin,HFrEF with an EF of 45% in december , and status post AICD presenting for weakness. Found to have worsening reanal fxn Scr 5.3, from baseline low 1s. Patient initially admitted to MICU due to hypotension, requiring multiple pressors. No evidence of infection s/p empiric abx, AI ruled out, s/p mulitple boluses, weaned off pressors and downgraded to SDU     #Shock, unclear source - now resolved  #Suspicion of AI - now ruled out  -Pt was hypotensive, no fever, no leukocytosis  -Hb stable, so signs of bleeding  -TTE: EF 45-50% with moderate TR only  -UA negative  -CXR normal  -CTAP negative  -RVP negative, MRSA negative  -Blood cx negative  -Received fluids, not responsive, started on levophed  -After having rapid rate Afib on 2/5, switched to phenylephrine and vasopressin was added, on balbir low dose (improved requirements), off vaso since 2 days now  -Cx taken, started empirically on cefepime, then ora, now ID cs recommending stopping all ABx as no source and cultures all negative  -TSH low 0.22, but free T4 normal so likely sick euthyroid syndrome, F/U T3 level  -AM cortisol intermediate at 4.8 (inappropriately low) so ordered ACTH level and did stim test  -Started on dexa 6 mg, initially then was stopped  -Endo consulted  -Stim test revealed no adrenal gland issue as cortisol level increased appropriately to >18  -2/9 Stop Dexamethasone. Monitor off steroids.   -2/9 Follow-up Endocrinology recommendations.   -2/9 Off pressors. Hemodynamically stable, MAP >65.      #Afib, was in RVR, now rate controlled  -Amio loaded and started on amio drip on 2/5, switched to PO amio then stopped on 2/10 (as per EP recs)  -Now rate controlled  -On metoprolol 200mg at home, resumed now at 12.5 mg BID  -On coumadin at home 5 mg daily (since it's a valvular Afib), were holding it initially for high INR but now resumed  -Ordered coumadin 3.5 mg on 2/8 and 2/9, today gave 5 mg  -F/u INR daily  -Amio now stopped so no need for adjusting warfarin dose anymore    #MARIANNE - resolved  #HAGMA  -Crea trended up as per patient from 1.2 baseline, to 3.1 in Jan, and now 5.3 on admission  -During stay it trended down back to baseline (1.2-1.3)  - Trend daily  -Holding home aldactone, ACEi, entresto and farxiga, to be resumed progressively  -Not euglycemic DKA as BHB negative  -Art inserted for accurate I/O, removed successfully  -PO HCO3 started, now stopped  -Midodrine 20mg q8  -KBUS negative  -MM w/u follow up    #HFimpEF  -TTE showed EF 45-50%, mod TR, s/p mitral annuloplasty  -Holding all his GDMT now as pt was in shock, would resume progressively     #Diet: DASH  #DVT pro: coumadin as per INR  #GI pro: Stop GI PPx, no longer on steroids.   #Activity: as tolerated  #Dispo: SDU  #Code status: Full code as per patient    Pending/Follow up:  Warfarin dose adjustments  Monitoring off abx  Endocrinology recommendations.  Resume GDMT (today added metoprolol). Patient is a 74-year-old male with past medical history of hypertension, dyslipidemia, mitral valve regurg, h/o MV annuloplasty ring in 1999,  A-fib on Coumadin, HFrEF with an EF of 45% in december , and status post AICD presenting for weakness. Found to have worsening reanal fxn Scr 5.3, from baseline low 1s. Patient initially admitted to MICU due to hypotension, requiring multiple pressors. No evidence of infection s/p empiric abx, AI ruled out, s/p multiple boluses, weaned off pressors and downgraded to SDU     Shock, unclear source - resolved, BP bordelrine  Rapid Afib- resolved  HFrEF 45%/ h/o AICD  - on room air, off pressors  - s/p multiple boluses, on Midodrine 20 q8H  - No evidence of infection, AI ruled out    - TSH low, Ft4 normal, t3 low  - TTE: EF 45-50% with moderate TR only  - case discussed with pt cardio Dr Cunningham today, echo unchanged since Nov 2023, cath was ok a few years ago. At baseline, pts bp ~110s on spironolactone, entresto and metop 300  - check orthostatics, may consider fludrocortisone  - keep holding GDMT  - PT  - cw daily inr and coumadin, patient takes 5mg daily   - cw metoprolol 12.5 q12 - patient takes 300 succ daily at home     MARIANNE - resolved  HAGMA  -  Scr back to baseline low 1s    Transaminitis - worsening, ? ischemic  - no RUQ pain, LFTs worsening  - avoid hepatotoxic meds, BP control and trend LFTs    Overall prognosis is guarded, continue monitoring in SDu  PEnding: monitor BP, IVF PRN, check orthostatics, improvement in LFTs     Patient seen at bedside, total time spent to evaluate and treat the patient's acute illness and chronic medical conditions as well as time spent reviewing prior records, labs, radiology, documenting in electronic medical records,  discussing medical plan with medical team was 55 minutes with >50% of time spent face to face with patient, discussing with patient/family as well as coordination of care           used

## 2025-02-11 NOTE — PROGRESS NOTE ADULT - SUBJECTIVE AND OBJECTIVE BOX
SUBJECTIVE/OVERNIGHT EVENTS  Today is hospital day 7d. This morning patient was seen and examined at bedside, resting comfortably in bed. No acute or major events overnight.      CODE STATUS: full code     FAMILY COMMUNICATION  Contact date:  Name of person contacted:  Relationship to patient:  Communication details:    MEDICATIONS  STANDING MEDICATIONS  calcium acetate 667 milliGRAM(s) Oral three times a day with meals  chlorhexidine 2% Cloths 1 Application(s) Topical daily  ezetimibe 10 milliGRAM(s) Oral daily  magnesium oxide 400 milliGRAM(s) Oral three times a day with meals  metoprolol tartrate 12.5 milliGRAM(s) Oral two times a day  midodrine 20 milliGRAM(s) Oral every 8 hours    PRN MEDICATIONS  lidocaine 5% Ointment 1 Application(s) Topical four times a day PRN    VITALS  T(F): 98.4 (02-11-25 @ 07:54), Max: 99 (02-10-25 @ 16:00)  HR: 89 (02-11-25 @ 07:54) (73 - 95)  BP: 118/55 (02-11-25 @ 07:54) (88/52 - 122/64)  RR: 20 (02-11-25 @ 07:54) (18 - 34)  SpO2: 100% (02-11-25 @ 07:54) (84% - 100%)    PHYSICAL EXAM  GENERAL  (  x) NAD, lying in bed comfortably     (  ) obtunded     (  ) lethargic     (  ) somnolent    HEAD  ( x ) Atraumatic     (  ) hematoma     (  ) laceration (specify location:       )     NECK  (x  ) Supple     (  ) neck stiffness     (  ) nuchal rigidity     (  )  no JVD     (  ) JVD present ( -- cm)    HEART  Rate -->  ( x ) normal rate    (  ) bradycardic    (  ) tachycardic  Rhythm -->  (  ) regular    (  ) regularly irregular    (x  ) irregularly irregular  Murmurs -->  (x  ) normal s1/s2    (  ) systolic murmur    (  ) diastolic murmur    (  ) continuous murmur     (  ) S3 present    (  ) S4 present    LUNGS  ( x )Unlabored respirations     (  ) tachypnea  ( x ) B/L air entry     (  ) decreased breath sounds in:  (location     )    ( x ) no adventitious sound     (  ) crackles     (  ) wheezing      (  ) rhonchi      (specify location:       )  (  ) chest wall tenderness (specify location:       )    ABDOMEN  (x  ) Soft     (  ) tense   |   (x  ) nondistended     (  ) distended   |   (  ) +BS     (  ) hypoactive bowel sounds     (  ) hyperactive bowel sounds  ( x ) nontender     (  ) RUQ tenderness     (  ) RLQ tenderness     (  ) LLQ tenderness     (  ) epigastric tenderness     (  ) diffuse tenderness  (  ) Splenomegaly      (  ) Hepatomegaly      (  ) Jaundice     (  ) ecchymosis     EXTREMITIES  ( x ) Normal     (  ) Rash     (  ) ecchymosis     (  ) varicose veins      (  ) pitting edema     (  ) non-pitting edema   (  ) ulceration     (  ) gangrene:     (location:     )    NERVOUS SYSTEM  ( x ) A&Ox3     (  ) confused     (  ) lethargic  CN II-XII:     (  ) Intact     (  ) focal deficits  (Specify:     )   Upper extremities:     (  ) strength X/5     (  ) focal deficit (specify:    )  Lower extremities:     (  ) strength  X/5    (  ) focal deficit (specify:    )    SKIN  (x  ) No rashes or lesions     (  ) maculopapular rash     (  ) pustules     (  ) vesicles     (  ) ulcer     (  ) ecchymosis     (specify location:     )    (  ) Indwelling Art Catheter   Date insterted:    Reason (  ) Critical illness     (  ) urinary retention    (  ) Accurate Ins/Outs Monitoring     (  ) CMO patient    (  ) Central Line  Date inserted:  Location: (  ) Right IJ   (  ) Left IJ   (  ) Right Fem   (  ) Left Fem    (  ) SPC  (  ) pigtail  (  ) PEG tube  (  ) colostomy  (  ) jejunostomy  (  ) U-Dall    LABS             8.9    9.50  )-----------( 215      ( 02-11-25 @ 06:14 )             28.7     142  |  109  |  28  -------------------------<  73   02-11-25 @ 06:14  4.9  |  23  |  1.1    Ca      7.4     02-11-25 @ 06:14  Mg     2.0     02-11-25 @ 06:14    TPro  4.7  /  Alb  3.1  /  TBili  0.7  /  DBili  x   /  AST  113  /  ALT  219  /  AlkPhos  67  /  GGT  x     02-11-25 @ 06:14    PT/INR - ( 02-11-25 @ 06:14 )   PT: 16.00 sec[H];   INR: 1.35 ratio[H]      Urinalysis Basic - ( 11 Feb 2025 06:14 )    Color: x / Appearance: x / SG: x / pH: x  Gluc: 73 mg/dL / Ketone: x  / Bili: x / Urobili: x   Blood: x / Protein: x / Nitrite: x   Leuk Esterase: x / RBC: x / WBC x   Sq Epi: x / Non Sq Epi: x / Bacteria: x          IMAGING

## 2025-02-11 NOTE — PROGRESS NOTE ADULT - ASSESSMENT
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 awake and alert     HEENT: Oral care    PULMONARY: On RA. HOB @ 45 degrees.  Aspiration precautions.  On room air     CARDIOVASCULAR: Echo noted. c/w Cardiology consult. c/w metoprolol. coumadin. Goal direct volume repletion.     GI: GI ppx: not indicated. Feeding. Bowel regimen PRN. IV fluids.     RENAL: Correct as needed. monitor I/O.     INFECTIOUS DISEASE: Follow up cultures.  Now off Abx given no evidence of infection.     HEMATOLOGICAL:  DVT prophylaxis.  Monitor CBC.  INR subtherapeutic today, monitor daily, resume warfarin.    ENDOCRINE:  Follow up FS.  Insulin protocol if needed. BHB negative.  Started dexamethasone, may have worsening BG.  ACTH stim demonstrated appropriate adrenal response.  Hold further dexamethasone.     f/u with endo    Appreciate endocrine recommendations.    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 awake and alert     HEENT: Oral care    PULMONARY: On RA. HOB @ 45 degrees.  Aspiration precautions.  On room air     CARDIOVASCULAR: Echo noted. c/w Cardiology consult. c/w metoprolol. coumadin. Goal direct volume repletion. continue midodrine     GI: GI ppx: not indicated. Feeding. Bowel regimen PRN. IV fluids.     RENAL: Correct as needed. monitor I/O.     INFECTIOUS DISEASE: Follow up cultures.  Now off Abx given no evidence of infection.     HEMATOLOGICAL:  DVT prophylaxis.  Monitor CBC.  INR subtherapeutic today, monitor daily, resume warfarin.    ENDOCRINE:  Follow up FS.  Insulin protocol if needed.   ACTH stim demonstrated appropriate adrenal response however baseline ACTH is low.   f/u with endo    MUSCULOSKELETAL: OOBTC    DISPO:  keep SDU for now   PT / rehab   CODE: FULL  LINES: L-IJ CVC

## 2025-02-11 NOTE — PHYSICAL THERAPY INITIAL EVALUATION ADULT - ADDITIONAL COMMENTS
pt lives with family in a house with 0 steps to enter. he was independent without device prior to admission.

## 2025-02-11 NOTE — PROGRESS NOTE ADULT - ASSESSMENT
#Shock, unclear source - now resolved  #Suspicion of AI - now ruled out  -Pt was hypotensive, no fever, no leukocytosis  -Hb stable, so signs of bleeding  -TTE: EF 45-50% with moderate TR only  -UA negative  -CXR normal  -CTAP negative  -RVP negative, MRSA negative  -Blood cx negative  -Received fluids, not responsive, started on levophed  -After having rapid rate Afib on 2/5, switched to phenylephrine and vasopressin was added, on balbir low dose (improved requirements), off vaso since 2 days now  -Cx taken, started empirically on cefepime, then ora, now ID cs recommending stopping all ABx as no source and cultures all negative  -TSH low 0.22, but free T4 normal so likely sick euthyroid syndrome, F/U T3 level  -AM cortisol intermediate at 4.8 (inappropriately low) so ordered ACTH level and did stim test  -Started on dexa 6 mg, initially then was stopped  -Endo consulted  -Stim test revealed no adrenal gland issue as cortisol level increased appropriately to >18  -2/9 Stop Dexamethasone. Monitor off steroids.   -2/9 Follow-up Endocrinology recommendations.   -2/9 Off pressors. Hemodynamically stable, MAP >65.      #Afib, was in RVR, now rate controlled  -Amio loaded and started on amio drip on 2/5, switched to PO amio then stopped on 2/10 (as per EP recs)  -Now rate controlled  -On metoprolol 200mg at home, resumed now at 12.5 mg BID  -On coumadin at home 5 mg daily (since it's a valvular Afib), were holding it initially for high INR but now resumed  -Ordered coumadin 3.5 mg on 2/8 and 2/9, today gave 5 mg  -F/u INR daily  -Amio now stopped so no need for adjusting warfarin dose anymore    #MARIANNE - resolved  #HAGMA  -Crea trended up as per patient from 1.2 baseline, to 3.1 in Jan, and now 5.3 on admission  -During stay it trended down back to baseline (1.2-1.3)  - Trend daily  -Holding home aldactone, ACEi, entresto and farxiga, to be resumed progressively  -Not euglycemic DKA as BHB negative  -Art inserted for accurate I/O, removed successfully  -PO HCO3 started, now stopped  -Midodrine 20mg q8  -KBUS negative  -MM w/u follow up    #HFimpEF  -TTE showed EF 45-50%, mod TR, s/p mitral annuloplasty  -Holding all his GDMT now as pt was in shock, would resume progressively     #Diet: DASH  #DVT pro: coumadin as per INR  #GI pro: Stop GI PPx, no longer on steroids.   #Activity: as tolerated  #Dispo: SDU  #Code status: Full code as per patient    Pending/Follow up:  Warfarin dose adjustments  Monitoring off abx  Endocrinology recommendations.  Resume GDMT (today added metoprolol). Patient is a 74-year-old male with past medical history of hypertension, dyslipidemia, mitral valve regurg, A-fib on Coumadin,HFrEF with an EF of 45% in december , and status post AICD presenting for weakness. Patient endorses persistent, complaining generalized weakness, fatigue, anorexia and poor p.o. intake for the past 5-6 weeks. Patient also reports a significant weight loss. Patient  states he had recent blood work which showed worsening kidney function, creatinine was 3.1 in  january up from a baseline of 1,2 in december and currently creatinine in 5.3.   Patient also hypotensive s/p hypotension, suspicion of AI, had rapid afib started on amio now d/c'ed .    #Shock, unclear source - now resolved  #Suspicion of AI - now ruled out  -Pt was hypotensive, no fever, no leukocytosis  -Hb stable, so signs of bleeding  -TTE: EF 45-50% with moderate TR only  -UA negative  -CXR normal  -CTAP negative  -RVP negative, MRSA negative  -Blood cx negative  -Received fluids, not responsive, started on levophed  -After having rapid rate Afib on 2/5, switched to phenylephrine and vasopressin was added, on balbir low dose (improved requirements), off vaso   -Cx taken, started empirically on cefepime, then ora, now ID cs recommending stopping all ABx as no source and cultures all negative  -TSH low 0.22, but free T4 normal so likely sick euthyroid syndrome, F/U T3 level  -AM cortisol intermediate at 4.8 (inappropriately low) so ordered ACTH level and did stim test  -Started on dexa 6 mg, initially then was stopped  -Endo consulted  -Stim test revealed no adrenal gland issue as cortisol level increased appropriately to >18. Dexa stopped   f/u  Follow-up Endocrinology recommendations.   Pt hypotensive 2/10. 1L LR bolus given. Currently on midodrine 20q8Hr     #Afib, was in RVR, now rate controlled  -Amio loaded and started on amio drip on 2/5, switched to PO amio then stopped on 2/10 (as per EP recs)  -Now rate controlled  -On metoprolol 200mg at home, resumed now at 12.5 mg BID  -On coumadin at home 5 mg daily (since it's a valvular Afib), previously held for high INR but now resumed  Pt currently subtherapeutic w/ INR 1.2. Will give 5mg tonight. If still subtherapeutic tomorrow, will inc to 7.5mg.   -F/u INR daily  -Amio now stopped so no need for adjusting warfarin dose anymore    #MARIANNE - resolved  #HAGMA  -Crea trended up as per patient from 1.2 baseline, to 3.1 in Jan, and now 5.3 on admission  -During stay it trended down back to baseline (1.2-1.3)  - Trend daily  -Holding home aldactone, ACEi, entresto and farxiga, to be resumed progressively  -Not euglycemic DKA as BHB negative  -Art inserted for accurate I/O, removed successfully  -PO HCO3 started, now stopped  -Midodrine 20mg q8- wean as tolerated   -KBUS negative  -MM w/u follow up    #HFimpEF  -TTE showed EF 45-50%, mod TR, s/p mitral annuloplasty  -GDMT held as pt was in shock, would resume progressively   Currently restarted metop    #Diet: DASH  #DVT pro: coumadin as per INR  #GI pro: Stop GI PPx, no longer on steroids.   #Activity: as tolerated  #Dispo: SDU  #Code status: Full code as per patient    Pending/Follow up:  Warfarin dose adjustments  Monitoring off abx  Endocrinology recommendations.  Resume GDMT (today added metoprolol).

## 2025-02-11 NOTE — PROGRESS NOTE ADULT - SUBJECTIVE AND OBJECTIVE BOX
Patient is a 74y old  Male who presents with a chief complaint of Shock (11 Feb 2025 09:14)        Over Night Events:        ROS:     All ROS are negative except HPI         PHYSICAL EXAM    ICU Vital Signs Last 24 Hrs  T(C): 36.8 (11 Feb 2025 11:33), Max: 37.2 (10 Feb 2025 16:00)  T(F): 98.2 (11 Feb 2025 11:33), Max: 99 (10 Feb 2025 16:00)  HR: 101 (11 Feb 2025 11:33) (73 - 101)  BP: 101/58 (11 Feb 2025 11:33) (88/52 - 122/64)  BP(mean): 74 (11 Feb 2025 11:33) (62 - 85)  ABP: --  ABP(mean): --  RR: 20 (11 Feb 2025 11:33) (18 - 34)  SpO2: 92% (11 Feb 2025 11:33) (84% - 100%)    O2 Parameters below as of 11 Feb 2025 11:33  Patient On (Oxygen Delivery Method): nasal cannula            CONSTITUTIONAL:  Well nourished.  NAD    ENT:   Airway patent,   Mouth with normal mucosa.   No thrush    EYES:   Pupils equal,   Round and reactive to light.    CARDIAC:   Normal rate,   Regular rhythm.    No edema      Vascular:  Normal systolic impulse  No Carotid bruits    RESPIRATORY:   No wheezing  Bilateral BS  Normal chest expansion  Not tachypneic,  No use of accessory muscles    GASTROINTESTINAL:  Abdomen soft,   Non-tender,   No guarding,   + BS    MUSCULOSKELETAL:   Range of motion is not limited,  No clubbing, cyanosis    NEUROLOGICAL:   Alert and oriented   No motor  deficits.    SKIN:   Skin normal color for race,   Warm and dry and intact.   No evidence of rash.    PSYCHIATRIC:   Normal mood and affect.   No apparent risk to self or others.    HEMATOLOGICAL:  No cervical  lymphadenopathy.  no inguinal lymphadenopathy      02-10-25 @ 07:01  -  02-11-25 @ 07:00  --------------------------------------------------------  IN:    IV PiggyBack: 150 mL    Oral Fluid: 1280 mL  Total IN: 1430 mL    OUT:    Voided (mL): 3000 mL  Total OUT: 3000 mL    Total NET: -1570 mL          LABS:                            8.9    9.50  )-----------( 215      ( 11 Feb 2025 06:14 )             28.7                                               02-11    142  |  109  |  28[H]  ----------------------------<  73  4.9   |  23  |  1.1    Ca    7.4[L]      11 Feb 2025 06:14  Mg     2.0     02-11    TPro  4.7[L]  /  Alb  3.1[L]  /  TBili  0.7  /  DBili  x   /  AST  113[H]  /  ALT  219[H]  /  AlkPhos  67  02-11      PT/INR - ( 11 Feb 2025 06:14 )   PT: 16.00 sec;   INR: 1.35 ratio                                                Urinalysis Basic - ( 11 Feb 2025 06:14 )    Color: x / Appearance: x / SG: x / pH: x  Gluc: 73 mg/dL / Ketone: x  / Bili: x / Urobili: x   Blood: x / Protein: x / Nitrite: x   Leuk Esterase: x / RBC: x / WBC x   Sq Epi: x / Non Sq Epi: x / Bacteria: x                                                  LIVER FUNCTIONS - ( 11 Feb 2025 06:14 )  Alb: 3.1 g/dL / Pro: 4.7 g/dL / ALK PHOS: 67 U/L / ALT: 219 U/L / AST: 113 U/L / GGT: x                                                                                                                                       MEDICATIONS  (STANDING):  calcium acetate 667 milliGRAM(s) Oral three times a day with meals  chlorhexidine 2% Cloths 1 Application(s) Topical daily  ezetimibe 10 milliGRAM(s) Oral daily  magnesium oxide 400 milliGRAM(s) Oral three times a day with meals  metoprolol tartrate 12.5 milliGRAM(s) Oral two times a day  midodrine 20 milliGRAM(s) Oral every 8 hours    MEDICATIONS  (PRN):  lidocaine 5% Ointment 1 Application(s) Topical four times a day PRN pain at kilgore area      New X-rays reviewed:                                                                                  ECHO    CXR interpreted by me:       Patient is a 74y old  Male who presents with a chief complaint of Shock (11 Feb 2025 09:14)        Over Night Events:  n NC.  Off levophed          ROS:     All ROS are negative except HPI         PHYSICAL EXAM    ICU Vital Signs Last 24 Hrs  T(C): 36.8 (11 Feb 2025 11:33), Max: 37.2 (10 Feb 2025 16:00)  T(F): 98.2 (11 Feb 2025 11:33), Max: 99 (10 Feb 2025 16:00)  HR: 101 (11 Feb 2025 11:33) (73 - 101)  BP: 101/58 (11 Feb 2025 11:33) (88/52 - 122/64)  BP(mean): 74 (11 Feb 2025 11:33) (62 - 85)  ABP: --  ABP(mean): --  RR: 20 (11 Feb 2025 11:33) (18 - 34)  SpO2: 92% (11 Feb 2025 11:33) (84% - 100%)    O2 Parameters below as of 11 Feb 2025 11:33  Patient On (Oxygen Delivery Method): nasal cannula            CONSTITUTIONAL:  Well nourished.  NAD    ENT:   Airway patent,   Mouth with normal mucosa.       EYES:   Pupils equal,   Round and reactive to light.    CARDIAC:   Normal rate,   Regular rhythm.        RESPIRATORY:   No wheezing  Bilateral BS  Normal chest expansion  Not tachypneic,  No use of accessory muscles    GASTROINTESTINAL:  Abdomen soft,   Non-tender,   No guarding,   + BS    MUSCULOSKELETAL:   Range of motion is not limited,  No clubbing, cyanosis    NEUROLOGICAL:   Alert and oriented   No motor  deficits.    SKIN:   Skin normal color for race,   Warm and dry   No evidence of rash.      02-10-25 @ 07:01  -  02-11-25 @ 07:00  --------------------------------------------------------  IN:    IV PiggyBack: 150 mL    Oral Fluid: 1280 mL  Total IN: 1430 mL    OUT:    Voided (mL): 3000 mL  Total OUT: 3000 mL    Total NET: -1570 mL          LABS:                            8.9    9.50  )-----------( 215      ( 11 Feb 2025 06:14 )             28.7                                               02-11    142  |  109  |  28[H]  ----------------------------<  73  4.9   |  23  |  1.1    Ca    7.4[L]      11 Feb 2025 06:14  Mg     2.0     02-11    TPro  4.7[L]  /  Alb  3.1[L]  /  TBili  0.7  /  DBili  x   /  AST  113[H]  /  ALT  219[H]  /  AlkPhos  67  02-11      PT/INR - ( 11 Feb 2025 06:14 )   PT: 16.00 sec;   INR: 1.35 ratio                                                Urinalysis Basic - ( 11 Feb 2025 06:14 )    Color: x / Appearance: x / SG: x / pH: x  Gluc: 73 mg/dL / Ketone: x  / Bili: x / Urobili: x   Blood: x / Protein: x / Nitrite: x   Leuk Esterase: x / RBC: x / WBC x   Sq Epi: x / Non Sq Epi: x / Bacteria: x                                                  LIVER FUNCTIONS - ( 11 Feb 2025 06:14 )  Alb: 3.1 g/dL / Pro: 4.7 g/dL / ALK PHOS: 67 U/L / ALT: 219 U/L / AST: 113 U/L / GGT: x                                                                                                                                       MEDICATIONS  (STANDING):  calcium acetate 667 milliGRAM(s) Oral three times a day with meals  chlorhexidine 2% Cloths 1 Application(s) Topical daily  ezetimibe 10 milliGRAM(s) Oral daily  magnesium oxide 400 milliGRAM(s) Oral three times a day with meals  metoprolol tartrate 12.5 milliGRAM(s) Oral two times a day  midodrine 20 milliGRAM(s) Oral every 8 hours    MEDICATIONS  (PRN):  lidocaine 5% Ointment 1 Application(s) Topical four times a day PRN pain at kilgore area      New X-rays reviewed:                                                                                  ECHO

## 2025-02-11 NOTE — PHARMACOTHERAPY INTERVENTION NOTE - COMMENTS
74yMale    Indication: AFib  INR Goal: 2-3  Home Dose: 5 mg daily  Bridge Therapy:    Current Medications:  calcium acetate 667 milliGRAM(s) Oral three times a day with meals  chlorhexidine 2% Cloths 1 Application(s) Topical daily  ezetimibe 10 milliGRAM(s) Oral daily  lidocaine 5% Ointment 1 Application(s) Topical four times a day PRN  magnesium oxide 400 milliGRAM(s) Oral three times a day with meals  metoprolol tartrate 12.5 milliGRAM(s) Oral two times a day  midodrine 20 milliGRAM(s) Oral every 8 hours      hemoglobin 8.9 g/dL (02-11-25 @ 06:14)    hematocrit 28.7 % (02-11-25 @ 06:14)    PLT: 215 K/uL (02-11-25 @ 06:14)    GFR:71 mL/min/1.73m2 (02-11-25 @ 06:14)        Drug Interactions: cefepime   IVPB   100 mL/Hr (02-05-25 @ 07:52)    cefTRIAXone   IVPB   100 mL/Hr (02-06-25 @ 13:15)    meropenem  IVPB   100 mL/Hr (02-06-25 @ 01:18)    meropenem  IVPB   100 mL/Hr (02-06-25 @ 05:32)    vancomycin  IVPB   300 mL/Hr (02-05-25 @ 08:38)    vancomycin  IVPB   250 mL/Hr (02-06-25 @ 13:49)        INR trend  10.47 ratio (02-04-25 @ 23:35)  7.88 ratio (02-05-25 @ 06:20)  4.00 ratio (02-06-25 @ 04:50)  2.14 ratio (02-07-25 @ 06:30)  1.48 ratio (02-08-25 @ 05:37)  1.31 ratio (02-09-25 @ 05:55)  1.40 ratio (02-10-25 @ 04:30)  1.35 ratio (02-11-25 @ 06:14)      Warfarin administration history:  warfarin: 3.5 milliGRAM(s) (02-07-25 @ 21:13)  warfarin: 3.5 milliGRAM(s) (02-08-25 @ 21:07)  warfarin: 3.5 milliGRAM(s) (02-09-25 @ 21:19)  warfarin: 5 milliGRAM(s) (02-10-25 @ 21:05)          1. INR today is:   1.35           [ * ] below goal ----- This is likely due to dose was held earlier                                            [   ] at goal                                            [   ] above goal ------ This is likely due to        2. Recommend Warfarin dose of 5 mg    PO x 1     3. Obtain INR tomorrow AM

## 2025-02-11 NOTE — PHYSICAL THERAPY INITIAL EVALUATION ADULT - GAIT TRAINING, PT EVAL
Goal: pt will ambulate with least restrictive assistive device 100 feet independent by discharge to facilitate return to PLOF.

## 2025-02-11 NOTE — PHYSICAL THERAPY INITIAL EVALUATION ADULT - PERTINENT HX OF CURRENT PROBLEM, REHAB EVAL
Patient is a 74-year-old male with past medical history of hypertension, dyslipidemia, mitral valve regurg, h/o MV annuloplasty ring in 1999,  A-fib on Coumadin, HFrEF with an EF of 45% in december , and status post AICD presenting for weakness. Found to have worsening reanal fxn Scr 5.3, from baseline low 1s. Patient initially admitted to MICU due to hypotension, requiring multiple pressors. No evidence of infection s/p empiric abx, AI ruled out, s/p multiple boluses, weaned off pressors and downgraded to SDU . Patient is a 74-year-old male with past medical history of hypertension, dyslipidemia, mitral valve regurg, h/o MV annuloplasty ring in 1999,  A-fib on Coumadin, HFrEF with an EF of 45% in december , and status post AICD presenting for weakness. Found to have worsening renal fxn Scr 5.3, from baseline low 1s. Patient initially admitted to MICU due to hypotension, requiring multiple pressors. No evidence of infection s/p empiric abx, AI ruled out, s/p multiple boluses, weaned off pressors and downgraded to SDU .

## 2025-02-11 NOTE — PROGRESS NOTE ADULT - ASSESSMENT
IMPRESSION:    MARIANNE, nonoliguric- improved  Euthyroid ss  Hypotension resolved.  now on Midodrine   Transaminitis.    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 awake and alert     HEENT: Oral care    PULMONARY: On RA. HOB @ 45 degrees.  Aspiration precautions.  On room air     CARDIOVASCULAR: Echo noted. c/w metoprolol. coumadin. Goal direct volume repletion. wean Midodrine     GI: GI ppx: not indicated. Feeding. Bowel regimen PRN.     RENAL: Correct as needed. monitor I/O.     INFECTIOUS DISEASE: Follow up cultures.  Now off Abx given no evidence of infection.     HEMATOLOGICAL:  DVT prophylaxis.  Monitor CBC.  INR subtherapeutic today, monitor daily, resume warfarin.    ENDOCRINE:  Follow up FS.  Insulin protocol if needed.   Endo follow up .  ACTH stim demonstrated appropriate adrenal response however baseline ACTH is low.    MUSCULOSKELETAL: OOBTC    DISPO:  keep SDU for now   PT / rehab   CODE: FULL  LINES: L-IJ CVC IMPRESSION:    MARIANNE, nonoliguric- improved  Euthyroid ss  Hypotension resolved.  now on Midodrine   Transaminitis.    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     HEENT: Oral care    PULMONARY: On RA. HOB @ 45 degrees.  Aspiration precautions.  On room air     CARDIOVASCULAR: Echo noted. c/w metoprolol. coumadin. Goal direct volume repletion.  Wean Midodrine     GI: GI ppx: not indicated. Feeding. Bowel regimen PRN.     RENAL: Correct as needed. monitor I/O.     INFECTIOUS DISEASE: Follow up cultures.  Now off Abx given no evidence of infection.     HEMATOLOGICAL:  DVT prophylaxis.  Monitor CBC.  INR subtherapeutic today, monitor daily, resume warfarin.    ENDOCRINE:  Follow up FS.  Insulin protocol if needed.   Endo follow up .  ACTH stim demonstrated appropriate adrenal response however baseline ACTH is low.    MUSCULOSKELETAL: OOBTC    DISPO:  keep SDU for now   PT / rehab   CODE: FULL  LINES: L-IJ CVC

## 2025-02-11 NOTE — PROGRESS NOTE ADULT - SUBJECTIVE AND OBJECTIVE BOX
Patient is a 74y old  Male who presents with a chief complaint of Acute renal failure     (06 Feb 2025 16:16)        Over Night Events:        ROS:     All ROS are negative except HPI         PHYSICAL EXAM    ICU Vital Signs Last 24 Hrs  T(C): 36.9 (11 Feb 2025 07:54), Max: 37.2 (10 Feb 2025 16:00)  T(F): 98.4 (11 Feb 2025 07:54), Max: 99 (10 Feb 2025 16:00)  HR: 89 (11 Feb 2025 07:54) (73 - 95)  BP: 118/55 (11 Feb 2025 07:54) (88/52 - 122/64)  BP(mean): 77 (11 Feb 2025 07:54) (62 - 85)  ABP: --  ABP(mean): --  RR: 20 (11 Feb 2025 07:54) (18 - 34)  SpO2: 100% (11 Feb 2025 07:54) (84% - 100%)    O2 Parameters below as of 11 Feb 2025 07:54  Patient On (Oxygen Delivery Method): nasal cannula            CONSTITUTIONAL:  Well nourished.  NAD    ENT:   Airway patent,   Mouth with normal mucosa.   No thrush    EYES:   Pupils equal,   Round and reactive to light.    CARDIAC:   Normal rate,   Regular rhythm.    No edema      Vascular:  Normal systolic impulse  No Carotid bruits    RESPIRATORY:   No wheezing  Bilateral BS  Normal chest expansion  Not tachypneic,  No use of accessory muscles    GASTROINTESTINAL:  Abdomen soft,   Non-tender,   No guarding,   + BS    MUSCULOSKELETAL:   Range of motion is not limited,  No clubbing, cyanosis    NEUROLOGICAL:   Alert and oriented   No motor  deficits.    SKIN:   Skin normal color for race,   Warm and dry and intact.   No evidence of rash.    PSYCHIATRIC:   Normal mood and affect.   No apparent risk to self or others.    HEMATOLOGICAL:  No cervical  lymphadenopathy.  no inguinal lymphadenopathy      02-10-25 @ 07:01  -  02-11-25 @ 07:00  --------------------------------------------------------  IN:    IV PiggyBack: 150 mL    Oral Fluid: 1280 mL  Total IN: 1430 mL    OUT:    Voided (mL): 3000 mL  Total OUT: 3000 mL    Total NET: -1570 mL          LABS:                            8.9    9.50  )-----------( 215      ( 11 Feb 2025 06:14 )             28.7                                               02-11    142  |  109  |  28[H]  ----------------------------<  73  4.9   |  23  |  1.1    Ca    7.4[L]      11 Feb 2025 06:14  Mg     2.0     02-11    TPro  4.7[L]  /  Alb  3.1[L]  /  TBili  0.7  /  DBili  x   /  AST  113[H]  /  ALT  219[H]  /  AlkPhos  67  02-11      PT/INR - ( 11 Feb 2025 06:14 )   PT: 16.00 sec;   INR: 1.35 ratio                                                Urinalysis Basic - ( 11 Feb 2025 06:14 )    Color: x / Appearance: x / SG: x / pH: x  Gluc: 73 mg/dL / Ketone: x  / Bili: x / Urobili: x   Blood: x / Protein: x / Nitrite: x   Leuk Esterase: x / RBC: x / WBC x   Sq Epi: x / Non Sq Epi: x / Bacteria: x                                                  LIVER FUNCTIONS - ( 11 Feb 2025 06:14 )  Alb: 3.1 g/dL / Pro: 4.7 g/dL / ALK PHOS: 67 U/L / ALT: 219 U/L / AST: 113 U/L / GGT: x                                                                                                                                       MEDICATIONS  (STANDING):  calcium acetate 667 milliGRAM(s) Oral three times a day with meals  chlorhexidine 2% Cloths 1 Application(s) Topical daily  ezetimibe 10 milliGRAM(s) Oral daily  magnesium oxide 400 milliGRAM(s) Oral three times a day with meals  metoprolol tartrate 12.5 milliGRAM(s) Oral two times a day  midodrine 20 milliGRAM(s) Oral every 8 hours    MEDICATIONS  (PRN):  lidocaine 5% Ointment 1 Application(s) Topical four times a day PRN pain at kilgore area      New X-rays reviewed:                                                                                  ECHO    CXR interpreted by me:       Patient is a 74y old  Male who presents with a chief complaint of Acute renal failure     (06 Feb 2025 16:16)        Over Night Events:  no events over night       ROS:     All ROS are negative except HPI         PHYSICAL EXAM    ICU Vital Signs Last 24 Hrs  T(C): 36.9 (11 Feb 2025 07:54), Max: 37.2 (10 Feb 2025 16:00)  T(F): 98.4 (11 Feb 2025 07:54), Max: 99 (10 Feb 2025 16:00)  HR: 89 (11 Feb 2025 07:54) (73 - 95)  BP: 118/55 (11 Feb 2025 07:54) (88/52 - 122/64)  BP(mean): 77 (11 Feb 2025 07:54) (62 - 85)  ABP: --  ABP(mean): --  RR: 20 (11 Feb 2025 07:54) (18 - 34)  SpO2: 100% (11 Feb 2025 07:54) (84% - 100%)    O2 Parameters below as of 11 Feb 2025 07:54  Patient On (Oxygen Delivery Method): nasal cannula            CONSTITUTIONAL:  Well nourished.  NAD    ENT:   Airway patent,   Mouth with normal mucosa.   No thrush    EYES:   Pupils equal,   Round and reactive to light.    CARDIAC:   Normal rate,   Regular rhythm.    No edema      RESPIRATORY:   No wheezing  Bilateral BS  No use of accessory muscles    GASTROINTESTINAL:  Abdomen soft,   Non-tender,   No guarding,   + BS    MUSCULOSKELETAL:   Range of motion is not limited,  No clubbing, cyanosis    NEUROLOGICAL:   Alert and oriented   No motor  deficits.    SKIN:   Skin normal color for race,   Warm and dry and intact.   No evidence of rash.          02-10-25 @ 07:01  -  02-11-25 @ 07:00  --------------------------------------------------------  IN:    IV PiggyBack: 150 mL    Oral Fluid: 1280 mL  Total IN: 1430 mL    OUT:    Voided (mL): 3000 mL  Total OUT: 3000 mL    Total NET: -1570 mL          LABS:                            8.9    9.50  )-----------( 215      ( 11 Feb 2025 06:14 )             28.7                                               02-11    142  |  109  |  28[H]  ----------------------------<  73  4.9   |  23  |  1.1    Ca    7.4[L]      11 Feb 2025 06:14  Mg     2.0     02-11    TPro  4.7[L]  /  Alb  3.1[L]  /  TBili  0.7  /  DBili  x   /  AST  113[H]  /  ALT  219[H]  /  AlkPhos  67  02-11      PT/INR - ( 11 Feb 2025 06:14 )   PT: 16.00 sec;   INR: 1.35 ratio                                                Urinalysis Basic - ( 11 Feb 2025 06:14 )    Color: x / Appearance: x / SG: x / pH: x  Gluc: 73 mg/dL / Ketone: x  / Bili: x / Urobili: x   Blood: x / Protein: x / Nitrite: x   Leuk Esterase: x / RBC: x / WBC x   Sq Epi: x / Non Sq Epi: x / Bacteria: x                                                  LIVER FUNCTIONS - ( 11 Feb 2025 06:14 )  Alb: 3.1 g/dL / Pro: 4.7 g/dL / ALK PHOS: 67 U/L / ALT: 219 U/L / AST: 113 U/L / GGT: x                                                                                                                                       MEDICATIONS  (STANDING):  calcium acetate 667 milliGRAM(s) Oral three times a day with meals  chlorhexidine 2% Cloths 1 Application(s) Topical daily  ezetimibe 10 milliGRAM(s) Oral daily  magnesium oxide 400 milliGRAM(s) Oral three times a day with meals  metoprolol tartrate 12.5 milliGRAM(s) Oral two times a day  midodrine 20 milliGRAM(s) Oral every 8 hours    MEDICATIONS  (PRN):  lidocaine 5% Ointment 1 Application(s) Topical four times a day PRN pain at kilgore area

## 2025-02-12 LAB
ALBUMIN SERPL ELPH-MCNC: 3.1 G/DL — LOW (ref 3.5–5.2)
ALP SERPL-CCNC: 59 U/L — SIGNIFICANT CHANGE UP (ref 30–115)
ALT FLD-CCNC: 152 U/L — HIGH (ref 0–41)
ANION GAP SERPL CALC-SCNC: 5 MMOL/L — LOW (ref 7–14)
AST SERPL-CCNC: 52 U/L — HIGH (ref 0–41)
BASOPHILS # BLD AUTO: 0.02 K/UL — SIGNIFICANT CHANGE UP (ref 0–0.2)
BASOPHILS NFR BLD AUTO: 0.3 % — SIGNIFICANT CHANGE UP (ref 0–1)
BILIRUB SERPL-MCNC: 0.6 MG/DL — SIGNIFICANT CHANGE UP (ref 0.2–1.2)
BUN SERPL-MCNC: 26 MG/DL — HIGH (ref 10–20)
CALCIUM SERPL-MCNC: 7.9 MG/DL — LOW (ref 8.4–10.5)
CHLORIDE SERPL-SCNC: 107 MMOL/L — SIGNIFICANT CHANGE UP (ref 98–110)
CO2 SERPL-SCNC: 26 MMOL/L — SIGNIFICANT CHANGE UP (ref 17–32)
CREAT SERPL-MCNC: 0.9 MG/DL — SIGNIFICANT CHANGE UP (ref 0.7–1.5)
EGFR: 90 ML/MIN/1.73M2 — SIGNIFICANT CHANGE UP
EOSINOPHIL # BLD AUTO: 0.32 K/UL — SIGNIFICANT CHANGE UP (ref 0–0.7)
EOSINOPHIL NFR BLD AUTO: 4.1 % — SIGNIFICANT CHANGE UP (ref 0–8)
GLUCOSE SERPL-MCNC: 75 MG/DL — SIGNIFICANT CHANGE UP (ref 70–99)
HCT VFR BLD CALC: 27.4 % — LOW (ref 42–52)
HGB BLD-MCNC: 8.5 G/DL — LOW (ref 14–18)
IMM GRANULOCYTES NFR BLD AUTO: 1.8 % — HIGH (ref 0.1–0.3)
INR BLD: 1.35 RATIO — HIGH (ref 0.65–1.3)
LYMPHOCYTES # BLD AUTO: 1.83 K/UL — SIGNIFICANT CHANGE UP (ref 1.2–3.4)
LYMPHOCYTES # BLD AUTO: 23.6 % — SIGNIFICANT CHANGE UP (ref 20.5–51.1)
MAGNESIUM SERPL-MCNC: 1.5 MG/DL — LOW (ref 1.8–2.4)
MCHC RBC-ENTMCNC: 30.2 PG — SIGNIFICANT CHANGE UP (ref 27–31)
MCHC RBC-ENTMCNC: 31 G/DL — LOW (ref 32–37)
MCV RBC AUTO: 97.5 FL — HIGH (ref 80–94)
MONOCYTES # BLD AUTO: 0.48 K/UL — SIGNIFICANT CHANGE UP (ref 0.1–0.6)
MONOCYTES NFR BLD AUTO: 6.2 % — SIGNIFICANT CHANGE UP (ref 1.7–9.3)
NEUTROPHILS # BLD AUTO: 4.96 K/UL — SIGNIFICANT CHANGE UP (ref 1.4–6.5)
NEUTROPHILS NFR BLD AUTO: 64 % — SIGNIFICANT CHANGE UP (ref 42.2–75.2)
NRBC BLD AUTO-RTO: 0 /100 WBCS — SIGNIFICANT CHANGE UP (ref 0–0)
PLATELET # BLD AUTO: 203 K/UL — SIGNIFICANT CHANGE UP (ref 130–400)
PMV BLD: 10.7 FL — HIGH (ref 7.4–10.4)
POTASSIUM SERPL-MCNC: 4.5 MMOL/L — SIGNIFICANT CHANGE UP (ref 3.5–5)
POTASSIUM SERPL-SCNC: 4.5 MMOL/L — SIGNIFICANT CHANGE UP (ref 3.5–5)
PROT SERPL-MCNC: 4.7 G/DL — LOW (ref 6–8)
PROTHROM AB SERPL-ACNC: 16 SEC — HIGH (ref 9.95–12.87)
RBC # BLD: 2.81 M/UL — LOW (ref 4.7–6.1)
RBC # FLD: 12.6 % — SIGNIFICANT CHANGE UP (ref 11.5–14.5)
SODIUM SERPL-SCNC: 138 MMOL/L — SIGNIFICANT CHANGE UP (ref 135–146)
WBC # BLD: 7.75 K/UL — SIGNIFICANT CHANGE UP (ref 4.8–10.8)
WBC # FLD AUTO: 7.75 K/UL — SIGNIFICANT CHANGE UP (ref 4.8–10.8)

## 2025-02-12 PROCEDURE — 99233 SBSQ HOSP IP/OBS HIGH 50: CPT

## 2025-02-12 PROCEDURE — 99223 1ST HOSP IP/OBS HIGH 75: CPT

## 2025-02-12 RX ORDER — MIDODRINE HYDROCHLORIDE 5 MG/1
15 TABLET ORAL EVERY 8 HOURS
Refills: 0 | Status: DISCONTINUED | OUTPATIENT
Start: 2025-02-12 | End: 2025-02-26

## 2025-02-12 RX ORDER — APIXABAN 2.5 MG/1
5 TABLET, FILM COATED ORAL
Refills: 0 | Status: DISCONTINUED | OUTPATIENT
Start: 2025-02-12 | End: 2025-02-28

## 2025-02-12 RX ORDER — SODIUM CHLORIDE 9 G/1000ML
500 INJECTION, SOLUTION INTRAVENOUS ONCE
Refills: 0 | Status: COMPLETED | OUTPATIENT
Start: 2025-02-12 | End: 2025-02-12

## 2025-02-12 RX ORDER — SODIUM CHLORIDE 9 G/1000ML
1000 INJECTION, SOLUTION INTRAVENOUS
Refills: 0 | Status: DISCONTINUED | OUTPATIENT
Start: 2025-02-12 | End: 2025-02-13

## 2025-02-12 RX ADMIN — Medication 667 MILLIGRAM(S): at 07:56

## 2025-02-12 RX ADMIN — Medication 40 MILLIGRAM(S): at 11:27

## 2025-02-12 RX ADMIN — SODIUM CHLORIDE 500 MILLILITER(S): 9 INJECTION, SOLUTION INTRAVENOUS at 16:48

## 2025-02-12 RX ADMIN — APIXABAN 5 MILLIGRAM(S): 2.5 TABLET, FILM COATED ORAL at 18:50

## 2025-02-12 RX ADMIN — MIDODRINE HYDROCHLORIDE 15 MILLIGRAM(S): 5 TABLET ORAL at 21:37

## 2025-02-12 RX ADMIN — SODIUM CHLORIDE 75 MILLILITER(S): 9 INJECTION, SOLUTION INTRAVENOUS at 18:36

## 2025-02-12 RX ADMIN — Medication 400 MILLIGRAM(S): at 16:46

## 2025-02-12 RX ADMIN — METOPROLOL SUCCINATE 12.5 MILLIGRAM(S): 50 TABLET, EXTENDED RELEASE ORAL at 05:34

## 2025-02-12 RX ADMIN — MIDODRINE HYDROCHLORIDE 10 MILLIGRAM(S): 5 TABLET ORAL at 05:34

## 2025-02-12 RX ADMIN — EZETIMIBE 10 MILLIGRAM(S): 10 TABLET ORAL at 11:28

## 2025-02-12 RX ADMIN — Medication 400 MILLIGRAM(S): at 07:56

## 2025-02-12 RX ADMIN — Medication 667 MILLIGRAM(S): at 16:46

## 2025-02-12 RX ADMIN — SODIUM CHLORIDE 500 MILLILITER(S): 9 INJECTION, SOLUTION INTRAVENOUS at 20:16

## 2025-02-12 RX ADMIN — Medication 1 APPLICATION(S): at 11:30

## 2025-02-12 RX ADMIN — Medication 667 MILLIGRAM(S): at 11:49

## 2025-02-12 RX ADMIN — Medication 400 MILLIGRAM(S): at 11:49

## 2025-02-12 RX ADMIN — METOPROLOL SUCCINATE 12.5 MILLIGRAM(S): 50 TABLET, EXTENDED RELEASE ORAL at 20:15

## 2025-02-12 RX ADMIN — MIDODRINE HYDROCHLORIDE 10 MILLIGRAM(S): 5 TABLET ORAL at 14:11

## 2025-02-12 NOTE — CONSULT NOTE ADULT - CONSULT REQUESTED DATE/TIME
04-Feb-2025 14:06
07-Feb-2025 11:16
05-Feb-2025 16:43
12-Feb-2025 12:44
04-Feb-2025 15:17
06-Feb-2025 09:43

## 2025-02-12 NOTE — CHART NOTE - NSCHARTNOTEFT_GEN_A_CORE
Notified by RN about a low BP reading.    Evaluated pt as bedside. All VS wnl, BP is normal ~120/80.    Increased Midodrine and ordered 500cc bolus d/t IVC collapsible and h/o low BP.

## 2025-02-12 NOTE — CONSULT NOTE ADULT - ATTENDING COMMENTS
IMPRESSION:    MARIANNE   Hypotension   Metabolic acidosis likely 2/2 uremia   Chronic A fib on warfarin  H/o CHF  H/o HTN    Plan as outlined above
# hypotension, hyperkalemia, concern for adrenal insufficiency   - am cortisol at 4.8 done after patient received dexamethasone, expected to be low   - likely symptoms and labs secondary to MARIANNE and diuretics   -will follow up on stim test done today , if normal can discontinue dexamethasone       # low TSH , history of thyroid nodules , patient on amiodarone   - TSH 0.22  Ft4 normal   - check TOTAL t3 ,TSI  possible sick euthyroid but need to rule out hyperthyroidism
Patient is a 74-year-old male with past medical history of hypertension, dyslipidemia, mitral valve regurg, A-fib on Coumadin, cardiomyopathy, CHF, and status post AICD presenting for weakness. Renal called for MARIANNE   When asked denied dysuria no hematuria no abdominal pain no diarrhea no focal motor sensory deficit   on Farxiga and ACE     #MARIANNE ? prerenal ? AIN / euglycemic DKA due to FArxiga use   # Acidosis due to DKA due to MARIANNE too   # hyperkalemia mild     - suggest check UA and U protein creat  - Hold Farxiga and entresto  - lokelma if K > 5.5   - LR at 100 cc per hour   - check RBUS  - check SPEP UPEP SFLC and IF   - further work up depending on above     will follow
Cardiologist:  Marisa    Consulted for GDMT recommendations.  Prior to admission was taking Toprol 200 mg in AM and 100 mg in PM for years.    /60s  HR  bpm during interview      - Titrate Metoprolol as recommended above  - May not tolerate Entresto due to borderline BP - can trial it prior to discharge and monitor  - No contraindications to changing from Coumadin to DOAC  - All other GDMT medications can be restarted stepwise as outpatient with Dr. Cunningham

## 2025-02-12 NOTE — PROGRESS NOTE ADULT - ASSESSMENT
IMPRESSION:    MARIANNE, nonoliguric- improved  Euthyroid ss  Hypotension resolved.  now on Midodrine   Transaminitis.    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     HEENT: Oral care    PULMONARY: On RA. HOB @ 45 degrees.  Aspiration precautions.  On room air     CARDIOVASCULAR: Echo noted. c/w metoprolol. coumadin. Goal direct volume repletion.  Wean Midodrine     GI: GI ppx: not indicated. Feeding. Bowel regimen PRN.     RENAL: Correct as needed. monitor I/O.     INFECTIOUS DISEASE: Follow up cultures.  Now off Abx given no evidence of infection.     HEMATOLOGICAL:  DVT prophylaxis.  Monitor CBC.  INR subtherapeutic today, monitor daily, resume warfarin.    ENDOCRINE:  Follow up FS.  Insulin protocol if needed.   Endo follow up .  ACTH stim demonstrated appropriate adrenal response however baseline ACTH is low.    MUSCULOSKELETAL: OOBTC    DISPO:  keep SDU for now   PT / rehab   CODE: FULL  LINES: L-IJ CVC IMPRESSION:    MARIANNE, nonoliguric- improved  Euthyroid ss  Hypotension resolved severely orthostatic.   Transaminitis.    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     HEENT: Oral care    PULMONARY: On RA. HOB @ 45 degrees.  Aspiration precautions.  On room air     CARDIOVASCULAR: Echo noted. c/w metoprolol. coumadin. Goal direct volume repletion.  Wean Midodrine     GI: GI ppx: not indicated. Feeding. Bowel regimen PRN.     RENAL: Correct as needed. monitor I/O.     INFECTIOUS DISEASE: Follow up cultures.  Now off Abx given no evidence of infection.     HEMATOLOGICAL:  DVT prophylaxis.  Monitor CBC.  INR subtherapeutic today, monitor daily, on warfarin consider eliquis     ENDOCRINE:  Follow up FS.  Insulin protocol if needed.   Endo recs appreciated.  ACTH stim test noted -ve     MUSCULOSKELETAL: OOBTC    DISPO: Possible DGTF  PT / rehab   CODE: FULL   IMPRESSION:    MARIANNE, nonoliguric- improved  Euthyroid ss  Hypotension resolved severely orthostatic.   Transaminitis.    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     HEENT: Oral care    PULMONARY: On RA. HOB @ 45 degrees.  Aspiration precautions.  On room air     CARDIOVASCULAR: Echo noted. c/w metoprolol. coumadin. Goal direct volume repletion.  Wean Midodrine     GI: GI ppx: not indicated. Feeding. Bowel regimen PRN.     RENAL: Correct as needed. monitor I/O.     INFECTIOUS DISEASE: Follow up cultures.  Now off Abx given no evidence of infection.     HEMATOLOGICAL:  DVT prophylaxis.  Monitor CBC.  INR subtherapeutic today, monitor daily, on warfarin consider eliquis     ENDOCRINE:  Follow up FS.  Insulin protocol if needed.   Endo recs appreciated.  ACTH stim test noted -ve     MUSCULOSKELETAL: OOBTC    DISPO: DG to tele  PT / rehab   CODE: FULL   IMPRESSION:    MARIANNE, nonoliguric- improved  Euthyroid ss  Hypotension resolved   Transaminitis.    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     HEENT: Oral care    PULMONARY: On RA. HOB @ 45 degrees.  Aspiration precautions.  On room air     CARDIOVASCULAR: Echo noted. c/w metoprolol. coumadin. Goal direct volume repletion.  Wean Midodrine     GI: GI ppx: not indicated. Feeding. Bowel regimen PRN.     RENAL: Correct as needed. monitor I/O.     INFECTIOUS DISEASE: Follow up cultures.  Now off Abx given no evidence of infection.     HEMATOLOGICAL:  DVT prophylaxis.  Monitor CBC.  INR subtherapeutic today, monitor daily, on warfarin consider eliquis     ENDOCRINE:  Follow up FS.  Insulin protocol if needed.   Endo recs appreciated.  ACTH stim test noted -ve     MUSCULOSKELETAL: OOBTC    DISPO: DG to tele  PT / rehab   CODE: FULL

## 2025-02-12 NOTE — PROGRESS NOTE ADULT - ASSESSMENT
Patient is a 74-year-old male with past medical history of hypertension, dyslipidemia, mitral valve regurg, h/o MV annuloplasty ring in 1999,  A-fib on Coumadin, HFrEF with an EF of 45% in december , and status post AICD presenting for weakness. Found to have worsening reanal fxn Scr 5.3, from baseline low 1s. Patient initially admitted to MICU due to hypotension, requiring multiple pressors. No evidence of infection s/p empiric abx, AI ruled out, s/p multiple boluses, weaned off pressors and downgraded to SDU     # Hypovolemic Shock due to polypharmacy / A-fib with RVR / HFrEF 45%/ h/o AICD  - on room air, off pressors now, IVC is collapsable , s/p  ml x once bolus today  -  on Midodrine 10 q8H, keep MAP above 65   - start maintenance fluids LR at 75 ml for 24 hours   - TSH low, Ft4 normal, t3 low  - TTE: EF 45-50% with moderate TR only  - case discussed with pt cardio Dr Cunningham ,  echo unchanged since Nov 2023, cath was ok a few years ago.   - consulted by cardiology today, recommendations noted:  - Wean midodrine  - Increase Metoprolol as tolerated. Increase to 12.5mg PO Q6 today. If tolerates, increase to 25mg PO Q8 tomorrow and then to 25mg PO Q6. Change to Metoprolol succinate prior to discharge. Target HR of ~ 70 bpm. Hold dose for SBP < 90 mmHg and HR < 65 bpm.   - Start Eliquis 5mg PO BID.   - reevaluate in 24 hours and start  Entresto as tolerated.  - Aldactone and Farxiga can be started outpatient.   - Replete Mg. Monitor electrolytes. Keep K > 4 and Mg > 2.2.    # Orthostatic Hypotension   - start maintenance fluids  for 24 hours   - check orthostatic VS Q shift   - ACE wrap on LE, abdominal binder   - fall precautions     # MARIANNE /HAGMA/ h/o nephrolithiasis   - resolved  - d/c Calcium Acetate   - monitor BUN/cr and urine output   - keep MAP above 65  - start maintenance fluids   - CT noted: No acute abnormality in the abdomen and pelvis. No hydronephrosis, Left-sided nephrolithiasis.      # Transaminitis   - no RUQ pain, monitor LFTs   - avoid hepatotoxic meds, BP control and trend LFTs    # Gi prophylaxis   - on PPIs    #Progress Note Handoff  Pending (specify):  start Eliquis tonight, f/u cardio recommendations, start maintenance fluids,  ACE wrap on LE, abdominal binder, reevaluate fluids in 24 hours, OOB to chair, PT/rehab, anticipate discharge in 24 hours   Family discussion: I spoke with pt, he agreed with a plan of care   Disposition: DGTF

## 2025-02-12 NOTE — CHART NOTE - NSCHARTNOTEFT_GEN_A_CORE
Transfer Note    Transfer from: CEU  Transfer to: Floor      Admitting History: A 74-year-old male with a history of hypertension, dyslipidemia, mitral valve regurgitation s/p valvuloplasty in 1999, atrial fibrillation on Coumadin, heart failure with reduced ejection fraction (HFrEF), and an AICD presented with weakness, fatigue, anorexia, poor oral intake, significant weight loss, and worsening acute kidney injury (MARIANNE) over several weeks.  His creatinine edvin from 1.2 to 5.3.  He was hypotensive, requiring pressors (levophed, phenylephrine, vasopressin, and low-dose norepinephrine).  The source of shock was unclear.  Infections were ruled out with negative cultures and imaging. He developed rapid atrial fibrillation and was started on amiodarone. His Coumadin was held due to a supratherapeutic INR and later resumed with dose adjustment per CYP cohepatic meabolism.  He was found to have sick euthyroid syndrome and a low AM cortisol, but an ACTH stimulation test ruled out adrenal insufficiency. Steroids were briefly started then discontinued. Endocrinology still following. His home medications for heart failure and hypertension were held due to hypotension. He was also started on bicarbonate for high anion gap metabolic acidosis (HAGMA), now discontinued. His hemodynamics stabilized, and he was weaned off pressors. Patient is now hemodynamically stable, off pressors, unremarkable physical exam, and stable for downgrade to SDU approved by attending.    amio stopped once pressors d/c'ed     Interim Events: Metop 12.5mg TID restarted  In SDU, pt was found to be very orthostatic for which 1L, 500cc's boluses were given. Fluid status checked w/ IVC. Pt was asymptomatic throughout   INR found to be subtheraputic, being given warfarin 5mg. Likely to be switched to eliquis on d/c- A-fib began 5/6 yrs after valvuloplasty so likely not the cause of his afib  Pt's cardiologist Dr. Cunningham, called. Had no recommendations at this time and requested in house cardiology call him with any questions.     In house cardiology consulted for ?GDMT optimization and if ok to switch to Eliquis.  Currently on midodrine 10mg TID. BP trending well  On RA with intermittent desats requiring 2L       For Follow-Up:  Cardio consult recs  f/u orthostatics q shift   monitor BP  INR, possible switch to eliquis   f/u endo recs if needed     ASSESSMENT & PLAN:     Patient is a 74-year-old male with past medical history of hypertension, dyslipidemia, mitral valve regurg, A-fib on Coumadin,HFrEF with an EF of 45% in december , and status post AICD presenting for weakness. Patient endorses persistent, complaining generalized weakness, fatigue, anorexia and poor p.o. intake for the past 5-6 weeks. Patient also reports a significant weight loss. Patient  states he had recent blood work which showed worsening kidney function, creatinine was 3.1 in  january up from a baseline of 1,2 in december and currently creatinine in 5.3.   Patient also hypotensive s/p hypotension, suspicion of AI, had rapid afib started on amio now d/c'ed .    #Shock, unclear source - now resolved  #Suspicion of AI - now ruled out  -Pt was hypotensive, no fever, no leukocytosis  -Hb stable, so signs of bleeding  -TTE: EF 45-50% with moderate TR only  -UA negative  -CXR normal  -CTAP negative  -RVP negative, MRSA negative  -Blood cx negative  -After having rapid rate Afib on 2/5, switched to phenylephrine and vasopressin was added, on balbir low dose (improved requirements), off vaso   -Cx taken, started empirically on cefepime, then ora, now ID cs recommending stopping all ABx as no source and cultures all negative  -TSH low 0.22, but free T4 normal so likely sick euthyroid syndrome, F/U T3 level  -AM cortisol intermediate at 4.8 (inappropriately low) so ordered ACTH level and did stim test  -Started on dexa 6 mg, initially then was stopped  -Endo consulted- f/u recs   -Stim test revealed no adrenal gland issue as cortisol level increased appropriately to >18. Dexa stopped   f/u  Follow-up Endocrinology recommendations.   Pt hypotensive 2/10. 1L LR bolus given. Currently on midodrine 20q8Hr     #Afib, was in RVR, now rate controlled  -Amio loaded and started on amio drip on 2/5, switched to PO amio then stopped on 2/10 (as per EP recs)  -Now rate controlled  -On metoprolol 200mg at home, resumed now at 12.5 mg BID  -On coumadin at home 5 mg daily (since it's a valvular Afib), previously held for high INR but now resumed  Pt currently subtherapeutic w/ INR 1.2. Will give 5mg tonight. If still subtherapeutic tomorrow, will inc to 7.5mg.   -F/u INR daily  -Amio now stopped so no need for adjusting warfarin dose anymore    #MARIANNE - resolved  #HAGMA  -Crea trended up as per patient from 1.2 baseline, to 3.1 in Jan, and now 5.3 on admission  -During stay it trended down back to baseline (1.2-1.3)  - Trend daily  -Holding home aldactone, ACEi, entresto and farxiga, to be resumed progressively  -Not euglycemic DKA as BHB negative  -Art inserted for accurate I/O, removed successfully  -PO HCO3 started, now stopped  -Midodrine 10mg q8- wean as tolerated   -KBUS negative  -MM w/u follow up    #HFimpEF  -TTE showed EF 45-50%, mod TR, s/p mitral annuloplasty  -GDMT held as pt was in shock, would resume progressively   Currently restarted metop  f/u cardio recs     #Diet: DASH  #DVT pro: coumadin as per INR  #GI pro: Stop GI PPx, no longer on steroids.   #Activity: as tolerated  #Dispo: SDU  #Code status: Full code as per patient              MEDICATIONS:  STANDING MEDICATIONS  calcium acetate 667 milliGRAM(s) Oral three times a day with meals  chlorhexidine 2% Cloths 1 Application(s) Topical daily  ezetimibe 10 milliGRAM(s) Oral daily  magnesium oxide 400 milliGRAM(s) Oral three times a day with meals  metoprolol tartrate 12.5 milliGRAM(s) Oral two times a day  midodrine 10 milliGRAM(s) Oral every 8 hours  pantoprazole    Tablet 40 milliGRAM(s) Oral daily    PRN MEDICATIONS  lidocaine 5% Ointment 1 Application(s) Topical four times a day PRN      VITAL SIGNS: Last 24 Hours  T(C): 36.5 (12 Feb 2025 11:27), Max: 36.8 (11 Feb 2025 23:54)  T(F): 97.7 (12 Feb 2025 11:27), Max: 98.3 (11 Feb 2025 23:54)  HR: 84 (12 Feb 2025 11:27) (79 - 171)  BP: 100/55 (12 Feb 2025 11:27) (90/54 - 140/78)  BP(mean): 75 (12 Feb 2025 11:27) (66 - 102)  RR: 20 (12 Feb 2025 11:27) (18 - 20)  SpO2: 100% (12 Feb 2025 11:27) (93% - 100%)    LABS:                        8.5    7.75  )-----------( 203      ( 12 Feb 2025 05:16 )             27.4     02-12    138  |  107  |  26[H]  ----------------------------<  75  4.5   |  26  |  0.9    Ca    7.9[L]      12 Feb 2025 05:16  Mg     1.5     02-12    TPro  4.7[L]  /  Alb  3.1[L]  /  TBili  0.6  /  DBili  x   /  AST  52[H]  /  ALT  152[H]  /  AlkPhos  59  02-12    PT/INR - ( 12 Feb 2025 05:16 )   PT: 16.00 sec;   INR: 1.35 ratio           Urinalysis Basic - ( 12 Feb 2025 05:16 )    Color: x / Appearance: x / SG: x / pH: x  Gluc: 75 mg/dL / Ketone: x  / Bili: x / Urobili: x   Blood: x / Protein: x / Nitrite: x   Leuk Esterase: x / RBC: x / WBC x   Sq Epi: x / Non Sq Epi: x / Bacteria: x

## 2025-02-12 NOTE — PROGRESS NOTE ADULT - SUBJECTIVE AND OBJECTIVE BOX
74-year-old male with a history of hypertension, dyslipidemia, mitral valve regurgitation, atrial fibrillation on Coumadin, heart failure with reduced ejection fraction (HFrEF), and an AICD presented with weakness, fatigue, anorexia, poor oral intake, significant weight loss, and worsening acute kidney injury (MARIANNE) over several weeks.  His creatinine edvin from 1.2 to 5.3.  He was hypotensive, requiring pressors (levophed, phenylephrine, vasopressin, and low-dose norepinephrine).  The source of shock was unclear.  Infections were ruled out with negative cultures and imaging. He developed rapid atrial fibrillation and was started on amiodarone. His Coumadin was held due to a supratherapeutic INR and later resumed with dose adjustment per CYP cohepatic meabolism.  He was found to have sick euthyroid syndrome and a low AM cortisol, but an ACTH stimulation test ruled out adrenal insufficiency. Steroids were briefly started then discontinued. Endocrinology still following. His home medications for heart failure and hypertension were held due to hypotension. He was also started on bicarbonate for high anion gap metabolic acidosis (HAGMA), now discontinued. His hemodynamics stabilized, and he was weaned off pressors. Patient is now hemodynamically stable, off pressors, unremarkable physical exam,  downgraded  to SDU.   Today pt is comfortable, weak with orthostatic hypotension, denies any specific complaints, was likely overmedicated prior admission to the hospital, IVC is collapsable, he received  ml x once bolus today.            Vital Signs:  T(C): 37.1 (12 Feb 2025 16:00), Max: 37.1 (12 Feb 2025 16:00)  T(F): 98.8 (12 Feb 2025 16:00), Max: 98.8 (12 Feb 2025 16:00)  HR: 80 (12 Feb 2025 16:00) (79 - 171)  BP: 128/74 (12 Feb 2025 16:00) (90/54 - 140/78)  BP(mean): 93 (12 Feb 2025 16:00) (66 - 102)  RR: 20 (12 Feb 2025 16:00) (18 - 20)  SpO2: 100% (12 Feb 2025 16:00) (100% - 100%)    Parameters below as of 12 Feb 2025 16:00  Patient On (Oxygen Delivery Method): nasal cannula        PHYSICAL EXAM:  GENERAL:  NAD, pleasant   SKIN: No rashes or lesions  HEENT: Atraumatic. Normocephalic.   NECK: Supple, No JVD.    PULMONARY: decreased BS at bases   CVS: Normal S1, S2. Rate and Rhythm are regular   ABDOMEN/GI: Soft, Nontender, Nondistended   MSK:  No clubbing or cyanosis   NEUROLOGIC:  Moves all extremities   PSYCH: Alert & oriented x 3, normal affect    Consultant(s) Notes Reviewed:  [x ] YES  [ ] NO  Care Discussed with Consultants/Other Providers [ x] YES  [ ] NO    LABS:                                   8.5    7.75  )-----------( 203      ( 12 Feb 2025 05:16 )             27.4   02-12    138  |  107  |  26[H]  ----------------------------<  75  4.5   |  26  |  0.9    Ca    7.9[L]      12 Feb 2025 05:16  Mg     1.5     02-12    TPro  4.7[L]  /  Alb  3.1[L]  /  TBili  0.6  /  DBili  x   /  AST  52[H]  /  ALT  152[H]  /  AlkPhos  59  02-12      RADIOLOGY & ADDITIONAL TESTS:  Imaging or report Personally Reviewed:  [x] YES  [ ] NO  EKG reviewed: [x] YES  [ ] NO    < from: Xray Chest 1 View-PORTABLE IMMEDIATE (Xray Chest 1 View-PORTABLE IMMEDIATE .) (02.05.25 @ 16:56) >  IMPRESSION:    Cardiomegaly. Support devices as described.    < end of copied text >  < from: CT Abdomen and Pelvis No Cont (02.04.25 @ 13:36) >  IMPRESSION:  No acute abnormality in the abdomen and pelvis. No hydronephrosis.    Left-sided nephrolithiasis.    < end of copied text >  < from: TTE Echo Complete w/ Contrast w/ Doppler (02.04.25 @ 15:53) >    Summary:   1. Left ventricular ejection fraction, by visual estimation, is 45 to   50%.   2. Technically difficult study.   3. Mildly decreased global left ventricular systolic function.   4. Normal left ventricular internal cavity size.   5. Left atrial size not well visualized.   6. Right atrial size not well visualized.   7. There appears to be a mitral annuloplasty ring.   8. Moderate tricuspid regurgitation.   9. Endocardial visualization was enhanced with intravenous echo contrast.      MEDICATIONS  (STANDING):  calcium acetate 667 milliGRAM(s) Oral three times a day with meals  chlorhexidine 2% Cloths 1 Application(s) Topical daily  ezetimibe 10 milliGRAM(s) Oral daily  magnesium oxide 400 milliGRAM(s) Oral three times a day with meals  metoprolol tartrate 12.5 milliGRAM(s) Oral two times a day  midodrine 10 milliGRAM(s) Oral every 8 hours  pantoprazole    Tablet 40 milliGRAM(s) Oral daily  warfarin 6 milliGRAM(s) Oral once    MEDICATIONS  (PRN):  lidocaine 5% Ointment 1 Application(s) Topical four times a day PRN pain at [place] area

## 2025-02-12 NOTE — CONSULT NOTE ADULT - CONSULT REASON
GDMT
hypotension
Low AM Cortisol
MARIANNE on CKD stage 2  Hyperkalemia  Metabolic acidosis
AF with RVR
Shock

## 2025-02-12 NOTE — CONSULT NOTE ADULT - SUBJECTIVE AND OBJECTIVE BOX
Date of Admission: 02-04-25    HISTORY OF PRESENT ILLNESS:      Patient is a 74-year-old male with past medical history of hypertension, dyslipidemia, mitral valve regurg, A-fib on Coumadin,HFrEF with an EF of 45% in december , and status post AICD presenting for weakness. Patient endorses persistent, complaining generalized weakness, fatigue, anorexia and poor p.o. intake for the past 5-6 weeks. Patient also reports a significant weight loss. Patient  states he had recent blood work which showed worsening kidney function, creatinine was 3.1 in  january up from a baseline of 1,2 in december and currently creatinine in 5.3.         · BP   65 mm Hg/45 mm Hg  · Heart Rate  86 /min  · Temp (C)  36.9 Degrees C oral   · SpO2 (%)  98 % on room air      (04 Feb 2025 13:48)      PAST MEDICAL & SURGICAL HISTORY      FAMILY HISTORY:    - None    SOCIAL HISTORY:   - Non-smoker    REVIEW OF SYMPTOMS:  CONSTITUTIONAL: No fevers or chills.  EYES: No visual changes, eye pain, or discharge  ENT: No vertigo; No ear pain or change in hearing; No sore throat or difficulty swallowing  NECK: No pain or stiffness  RESPIRATORY: Shortness of breath  CARDIOVASCULAR: No chest pain, chest pressure or palpitations  GASTROINTESTINAL: No abdominal or epigastric pain; No nausea, vomiting, or hematemesis; No diarrhea or constipation; No melena or hematochezia  GENITOURINARY: No dysuria, frequency or hematuria  MUSCULOSKELETAL: No joint pain, no muscle pain  NEUROLOGICAL: No numbness or weakness  SKIN: No itching or rashes    VITALS:  T(C): 36.5 (02-12-25 @ 11:27), Max: 36.8 (02-11-25 @ 23:54)  HR: 84 (02-12-25 @ 11:27) (79 - 171)  BP: 100/55 (02-12-25 @ 11:27) (90/54 - 140/78)  RR: 20 (02-12-25 @ 11:27) (18 - 20)  SpO2: 100% (02-12-25 @ 11:27) (93% - 100%)  Wt(kg): --  Daily     Daily     PHYSICAL EXAM:  GEN: Not in acute distress  HEENT: EOMI  LUNGS: Dec BS   CARDIOVASCULAR: RRR, S1/S2 present  ABD: Soft, non-tender, non-distended  EXT: No REBEKAH  SKIN: Warm  NEURO: AAOx3    LABS:	 	                        8.5    7.75  )-----------( 203      ( 12 Feb 2025 05:16 )             27.4     02-12    138  |  107  |  26[H]  ----------------------------<  75  4.5   |  26  |  0.9    Ca    7.9[L]      12 Feb 2025 05:16  Mg     1.5     02-12    TPro  4.7[L]  /  Alb  3.1[L]  /  TBili  0.6  /  DBili  x   /  AST  52[H]  /  ALT  152[H]  /  AlkPhos  59  02-12        PT/INR - ( 12 Feb 2025 05:16 )   PT: 16.00 sec;   INR: 1.35 ratio             Intake and Output:    02-11-25 @ 07:01  -  02-12-25 @ 07:00  --------------------------------------------------------  IN: 2190 mL / OUT: 1875 mL / NET: 315 mL        CARDIAC MARKERS:  Troponin T, High Sensitivity Result: 33 ng/L (02-05-25 @ 00:40)  Troponin T, High Sensitivity Result: 33 ng/L (02-04-25 @ 23:35)  Troponin T, High Sensitivity Result: 34 ng/L (02-04-25 @ 20:00)  Troponin T, High Sensitivity Result: 43 ng/L (02-04-25 @ 16:17)  Troponin T, High Sensitivity Result: 56 ng/L (02-04-25 @ 10:50)      TELEMETRY EVENTS:    ECG:    RADIOLOGY:    OTHER:    Echocardiogram:    Catheterization:    Stress Test: 	    Home Medications:  Aldactone 25 mg oral tablet: 1 tab(s) orally once a day (at bedtime) (04 Feb 2025 15:09)  Entresto 97 mg-103 mg oral tablet: 1 tab(s) orally 2 times a day (04 Feb 2025 15:09)  ezetimibe 10 mg oral tablet: 1 tab(s) orally once a day (04 Feb 2025 15:09)  Farxiga 10 mg oral tablet: 1 tab(s) orally once a day (04 Feb 2025 15:09)  metoprolol succinate 100 mg oral tablet, extended release: 1 tab(s) orally once a day (in the afternoon) (04 Feb 2025 15:09)  metoprolol succinate 200 mg oral capsule, extended release: 1 cap(s) orally once a day (in the morning) (04 Feb 2025 15:10)  NexIUM 20 mg oral delayed release capsule: 1 cap(s) orally once a day (at bedtime) (04 Feb 2025 15:10)  rosuvastatin 40 mg oral tablet: 1 tab(s) orally once a day (04 Feb 2025 15:10)  warfarin 5 mg oral tablet: 1 tab(s) orally once a day (04 Feb 2025 15:10)    MEDICATIONS  (STANDING):  calcium acetate 667 milliGRAM(s) Oral three times a day with meals  chlorhexidine 2% Cloths 1 Application(s) Topical daily  ezetimibe 10 milliGRAM(s) Oral daily  magnesium oxide 400 milliGRAM(s) Oral three times a day with meals  metoprolol tartrate 12.5 milliGRAM(s) Oral two times a day  midodrine 10 milliGRAM(s) Oral every 8 hours  pantoprazole    Tablet 40 milliGRAM(s) Oral daily    MEDICATIONS  (PRN):  lidocaine 5% Ointment 1 Application(s) Topical four times a day PRN pain at [place] area         Date of Admission: 02-04-25    HISTORY OF PRESENT ILLNESS:      Patient is a 74-year-old male with past medical history of hypertension, dyslipidemia, mitral valve regurg, A-fib on Coumadin,HFrEF with an EF of 45% in december , and status post AICD presenting for weakness. Patient endorses persistent, complaining generalized weakness, fatigue, anorexia and poor p.o. intake for the past 5-6 weeks. Patient also reports a significant weight loss. Patient  states he had recent blood work which showed worsening kidney function, creatinine was 3.1 in  january up from a baseline of 1,2 in december and currently creatinine in 5.3.         · BP   65 mm Hg/45 mm Hg  · Heart Rate  86 /min  · Temp (C)  36.9 Degrees C oral   · SpO2 (%)  98 % on room air      (04 Feb 2025 13:48)      PAST MEDICAL & SURGICAL HISTORY      FAMILY HISTORY:    - None    SOCIAL HISTORY:   - Non-smoker    REVIEW OF SYMPTOMS:  CONSTITUTIONAL: No fevers or chills.  EYES: No visual changes, eye pain, or discharge  ENT: No vertigo; No ear pain or change in hearing; No sore throat or difficulty swallowing  NECK: No pain or stiffness  RESPIRATORY: Shortness of breath  CARDIOVASCULAR: No chest pain, chest pressure or palpitations  GASTROINTESTINAL: No abdominal or epigastric pain; No nausea, vomiting, or hematemesis; No diarrhea or constipation; No melena or hematochezia  GENITOURINARY: No dysuria, frequency or hematuria  MUSCULOSKELETAL: No joint pain, no muscle pain  NEUROLOGICAL: No numbness or weakness  SKIN: No itching or rashes    VITALS:  T(C): 36.5 (02-12-25 @ 11:27), Max: 36.8 (02-11-25 @ 23:54)  HR: 84 (02-12-25 @ 11:27) (79 - 171)  BP: 100/55 (02-12-25 @ 11:27) (90/54 - 140/78)  RR: 20 (02-12-25 @ 11:27) (18 - 20)  SpO2: 100% (02-12-25 @ 11:27) (93% - 100%)  Wt(kg): --  Daily     Daily     PHYSICAL EXAM:  GEN: Not in acute distress  HEENT: EOMI  LUNGS: Dec BS   CARDIOVASCULAR: RRR, S1/S2 present  ABD: Soft, non-tender, non-distended  EXT: No REBEKAH  SKIN: Warm  NEURO: AAOx3    LABS:	 	                        8.5    7.75  )-----------( 203      ( 12 Feb 2025 05:16 )             27.4     02-12    138  |  107  |  26[H]  ----------------------------<  75  4.5   |  26  |  0.9    Ca    7.9[L]      12 Feb 2025 05:16  Mg     1.5     02-12    TPro  4.7[L]  /  Alb  3.1[L]  /  TBili  0.6  /  DBili  x   /  AST  52[H]  /  ALT  152[H]  /  AlkPhos  59  02-12        PT/INR - ( 12 Feb 2025 05:16 )   PT: 16.00 sec;   INR: 1.35 ratio             Intake and Output:    02-11-25 @ 07:01  -  02-12-25 @ 07:00  --------------------------------------------------------  IN: 2190 mL / OUT: 1875 mL / NET: 315 mL        CARDIAC MARKERS:  Troponin T, High Sensitivity Result: 33 ng/L (02-05-25 @ 00:40)  Troponin T, High Sensitivity Result: 33 ng/L (02-04-25 @ 23:35)  Troponin T, High Sensitivity Result: 34 ng/L (02-04-25 @ 20:00)  Troponin T, High Sensitivity Result: 43 ng/L (02-04-25 @ 16:17)  Troponin T, High Sensitivity Result: 56 ng/L (02-04-25 @ 10:50)      TELEMETRY EVENTS:    ECG:    RADIOLOGY:    OTHER:    Echocardiogram:  < from: TTE Echo Complete w/ Contrast w/ Doppler (02.04.25 @ 15:53) >   1. Left ventricular ejection fraction, by visual estimation, is 45 to   50%.   2. Technically difficult study.   3. Mildly decreased global left ventricular systolic function.   4. Normal left ventricular internal cavity size.   5. Left atrial size not well visualized.   6. Right atrial size not well visualized.   7. There appears to be a mitral annuloplasty ring.   8. Moderate tricuspid regurgitation.   9. Endocardial visualization was enhanced with intravenous echo contrast.        < end of copied text >    Catheterization:    Stress Test: 	    Home Medications:  Aldactone 25 mg oral tablet: 1 tab(s) orally once a day (at bedtime) (04 Feb 2025 15:09)  Entresto 97 mg-103 mg oral tablet: 1 tab(s) orally 2 times a day (04 Feb 2025 15:09)  ezetimibe 10 mg oral tablet: 1 tab(s) orally once a day (04 Feb 2025 15:09)  Farxiga 10 mg oral tablet: 1 tab(s) orally once a day (04 Feb 2025 15:09)  metoprolol succinate 100 mg oral tablet, extended release: 1 tab(s) orally once a day (in the afternoon) (04 Feb 2025 15:09)  metoprolol succinate 200 mg oral capsule, extended release: 1 cap(s) orally once a day (in the morning) (04 Feb 2025 15:10)  NexIUM 20 mg oral delayed release capsule: 1 cap(s) orally once a day (at bedtime) (04 Feb 2025 15:10)  rosuvastatin 40 mg oral tablet: 1 tab(s) orally once a day (04 Feb 2025 15:10)  warfarin 5 mg oral tablet: 1 tab(s) orally once a day (04 Feb 2025 15:10)    MEDICATIONS  (STANDING):  calcium acetate 667 milliGRAM(s) Oral three times a day with meals  chlorhexidine 2% Cloths 1 Application(s) Topical daily  ezetimibe 10 milliGRAM(s) Oral daily  magnesium oxide 400 milliGRAM(s) Oral three times a day with meals  metoprolol tartrate 12.5 milliGRAM(s) Oral two times a day  midodrine 10 milliGRAM(s) Oral every 8 hours  pantoprazole    Tablet 40 milliGRAM(s) Oral daily    MEDICATIONS  (PRN):  lidocaine 5% Ointment 1 Application(s) Topical four times a day PRN pain at [place] area         Date of Admission: 02-04-25    HISTORY OF PRESENT ILLNESS:      Patient is a 74-year-old male with past medical history of hypertension, dyslipidemia, mitral valve regurg, A-fib on Coumadin, HFrEF with an EF of 45% in december , and status post AICD presenting for weakness. Patient endorses persistent, complaining generalized weakness, fatigue, anorexia and poor p.o. intake for the past 5-6 weeks. Patient also reports a significant weight loss. Patient  states he had recent blood work which showed worsening kidney function, creatinine was 3.1 in  january up from a baseline of 1,2 in december and currently creatinine in 5.3.       FAMILY HISTORY:  - None    SOCIAL HISTORY:   - Non-smoker    REVIEW OF SYMPTOMS:  CONSTITUTIONAL: No fevers or chills.  EYES: No visual changes, eye pain, or discharge  ENT: No vertigo; No ear pain or change in hearing; No sore throat or difficulty swallowing  NECK: No pain or stiffness  RESPIRATORY: Shortness of breath  CARDIOVASCULAR: No chest pain, chest pressure or palpitations  GASTROINTESTINAL: No abdominal or epigastric pain; No nausea, vomiting, or hematemesis; No diarrhea or constipation; No melena or hematochezia  GENITOURINARY: No dysuria, frequency or hematuria  MUSCULOSKELETAL: No joint pain, no muscle pain  NEUROLOGICAL: No numbness or weakness  SKIN: No itching or rashes    VITALS:  T(C): 36.5 (02-12-25 @ 11:27), Max: 36.8 (02-11-25 @ 23:54)  HR: 84 (02-12-25 @ 11:27) (79 - 171)  BP: 100/55 (02-12-25 @ 11:27) (90/54 - 140/78)  RR: 20 (02-12-25 @ 11:27) (18 - 20)  SpO2: 100% (02-12-25 @ 11:27) (93% - 100%)  Wt(kg): --  Daily     Daily     PHYSICAL EXAM:  GEN: Not in acute distress  HEENT: EOMI  LUNGS: Dec BS   CARDIOVASCULAR: RRR, S1/S2 present  ABD: Soft, non-tender, non-distended  EXT: No REBEKAH  SKIN: Warm  NEURO: AAOx3    LABS:	 	                        8.5    7.75  )-----------( 203      ( 12 Feb 2025 05:16 )             27.4     02-12    138  |  107  |  26[H]  ----------------------------<  75  4.5   |  26  |  0.9    Ca    7.9[L]      12 Feb 2025 05:16  Mg     1.5     02-12    TPro  4.7[L]  /  Alb  3.1[L]  /  TBili  0.6  /  DBili  x   /  AST  52[H]  /  ALT  152[H]  /  AlkPhos  59  02-12      PT/INR - ( 12 Feb 2025 05:16 )   PT: 16.00 sec;   INR: 1.35 ratio         Intake and Output:    02-11-25 @ 07:01  -  02-12-25 @ 07:00  --------------------------------------------------------  IN: 2190 mL / OUT: 1875 mL / NET: 315 mL        CARDIAC MARKERS:  Troponin T, High Sensitivity Result: 33 ng/L (02-05-25 @ 00:40)  Troponin T, High Sensitivity Result: 33 ng/L (02-04-25 @ 23:35)  Troponin T, High Sensitivity Result: 34 ng/L (02-04-25 @ 20:00)  Troponin T, High Sensitivity Result: 43 ng/L (02-04-25 @ 16:17)  Troponin T, High Sensitivity Result: 56 ng/L (02-04-25 @ 10:50)        Echocardiogram:  < from: TTE Echo Complete w/ Contrast w/ Doppler (02.04.25 @ 15:53) >   1. Left ventricular ejection fraction, by visual estimation, is 45 to   50%.   2. Technically difficult study.   3. Mildly decreased global left ventricular systolic function.   4. Normal left ventricular internal cavity size.   5. Left atrial size not well visualized.   6. Right atrial size not well visualized.   7. There appears to be a mitral annuloplasty ring.   8. Moderate tricuspid regurgitation.   9. Endocardial visualization was enhanced with intravenous echo contrast.        Home Medications:  Aldactone 25 mg oral tablet: 1 tab(s) orally once a day (at bedtime) (04 Feb 2025 15:09)  Entresto 97 mg-103 mg oral tablet: 1 tab(s) orally 2 times a day (04 Feb 2025 15:09)  ezetimibe 10 mg oral tablet: 1 tab(s) orally once a day (04 Feb 2025 15:09)  Farxiga 10 mg oral tablet: 1 tab(s) orally once a day (04 Feb 2025 15:09)  metoprolol succinate 100 mg oral tablet, extended release: 1 tab(s) orally once a day (in the afternoon) (04 Feb 2025 15:09)  metoprolol succinate 200 mg oral capsule, extended release: 1 cap(s) orally once a day (in the morning) (04 Feb 2025 15:10)  NexIUM 20 mg oral delayed release capsule: 1 cap(s) orally once a day (at bedtime) (04 Feb 2025 15:10)  rosuvastatin 40 mg oral tablet: 1 tab(s) orally once a day (04 Feb 2025 15:10)  warfarin 5 mg oral tablet: 1 tab(s) orally once a day (04 Feb 2025 15:10)    MEDICATIONS  (STANDING):  calcium acetate 667 milliGRAM(s) Oral three times a day with meals  chlorhexidine 2% Cloths 1 Application(s) Topical daily  ezetimibe 10 milliGRAM(s) Oral daily  magnesium oxide 400 milliGRAM(s) Oral three times a day with meals  metoprolol tartrate 12.5 milliGRAM(s) Oral two times a day  midodrine 10 milliGRAM(s) Oral every 8 hours  pantoprazole    Tablet 40 milliGRAM(s) Oral daily    MEDICATIONS  (PRN):  lidocaine 5% Ointment 1 Application(s) Topical four times a day PRN pain at [facility]

## 2025-02-12 NOTE — PROGRESS NOTE ADULT - SUBJECTIVE AND OBJECTIVE BOX
Patient is a 74y old  Male who presents with a chief complaint of Shock (11 Feb 2025 09:14)        Over Night Events:        ROS:     All ROS are negative except HPI         PHYSICAL EXAM    ICU Vital Signs Last 24 Hrs  T(C): 36.7 (12 Feb 2025 04:00), Max: 36.8 (11 Feb 2025 11:33)  T(F): 98.1 (12 Feb 2025 04:00), Max: 98.3 (11 Feb 2025 23:54)  HR: 79 (12 Feb 2025 04:00) (79 - 171)  BP: 90/54 (12 Feb 2025 04:00) (90/54 - 117/76)  BP(mean): 66 (12 Feb 2025 04:00) (66 - 90)  ABP: --  ABP(mean): --  RR: 18 (12 Feb 2025 04:00) (18 - 20)  SpO2: 100% (12 Feb 2025 04:00) (92% - 100%)    O2 Parameters below as of 11 Feb 2025 23:54  Patient On (Oxygen Delivery Method): nasal cannula            CONSTITUTIONAL:  Well nourished.  NAD    ENT:   Airway patent,   Mouth with normal mucosa.   No thrush    EYES:   Pupils equal,   Round and reactive to light.    CARDIAC:   Normal rate,   Regular rhythm.    No edema      Vascular:  Normal systolic impulse  No Carotid bruits    RESPIRATORY:   No wheezing  Bilateral BS  Normal chest expansion  Not tachypneic,  No use of accessory muscles    GASTROINTESTINAL:  Abdomen soft,   Non-tender,   No guarding,   + BS    MUSCULOSKELETAL:   Range of motion is not limited,  No clubbing, cyanosis    NEUROLOGICAL:   Alert and oriented   No motor  deficits.    SKIN:   Skin normal color for race,   Warm and dry and intact.   No evidence of rash.    PSYCHIATRIC:   Normal mood and affect.   No apparent risk to self or others.    HEMATOLOGICAL:  No cervical  lymphadenopathy.  no inguinal lymphadenopathy      02-11-25 @ 07:01  -  02-12-25 @ 07:00  --------------------------------------------------------  IN:    Lactated Ringers Bolus: 1500 mL    Oral Fluid: 690 mL  Total IN: 2190 mL    OUT:    Voided (mL): 1875 mL  Total OUT: 1875 mL    Total NET: 315 mL          LABS:                            8.5    7.75  )-----------( 203      ( 12 Feb 2025 05:16 )             27.4                                               02-12    138  |  107  |  26[H]  ----------------------------<  75  4.5   |  26  |  0.9    Ca    7.9[L]      12 Feb 2025 05:16  Mg     1.5     02-12    TPro  4.7[L]  /  Alb  3.1[L]  /  TBili  0.6  /  DBili  x   /  AST  52[H]  /  ALT  152[H]  /  AlkPhos  59  02-12      PT/INR - ( 12 Feb 2025 05:16 )   PT: 16.00 sec;   INR: 1.35 ratio                                                Urinalysis Basic - ( 12 Feb 2025 05:16 )    Color: x / Appearance: x / SG: x / pH: x  Gluc: 75 mg/dL / Ketone: x  / Bili: x / Urobili: x   Blood: x / Protein: x / Nitrite: x   Leuk Esterase: x / RBC: x / WBC x   Sq Epi: x / Non Sq Epi: x / Bacteria: x                                                  LIVER FUNCTIONS - ( 12 Feb 2025 05:16 )  Alb: 3.1 g/dL / Pro: 4.7 g/dL / ALK PHOS: 59 U/L / ALT: 152 U/L / AST: 52 U/L / GGT: x                                                                                                                                       MEDICATIONS  (STANDING):  calcium acetate 667 milliGRAM(s) Oral three times a day with meals  chlorhexidine 2% Cloths 1 Application(s) Topical daily  ezetimibe 10 milliGRAM(s) Oral daily  magnesium oxide 400 milliGRAM(s) Oral three times a day with meals  metoprolol tartrate 12.5 milliGRAM(s) Oral two times a day  midodrine 10 milliGRAM(s) Oral every 8 hours  pantoprazole    Tablet 40 milliGRAM(s) Oral daily    MEDICATIONS  (PRN):  lidocaine 5% Ointment 1 Application(s) Topical four times a day PRN pain at [place] area      New X-rays reviewed:                                                                                  ECHO    CXR interpreted by me:       Patient is a 74y old  Male who presents with a chief complaint of Shock (11 Feb 2025 09:14)        Over Night Events:  no events   no complaints       ROS:     All ROS are negative except HPI         PHYSICAL EXAM    ICU Vital Signs Last 24 Hrs  T(C): 36.7 (12 Feb 2025 04:00), Max: 36.8 (11 Feb 2025 11:33)  T(F): 98.1 (12 Feb 2025 04:00), Max: 98.3 (11 Feb 2025 23:54)  HR: 79 (12 Feb 2025 04:00) (79 - 171)  BP: 90/54 (12 Feb 2025 04:00) (90/54 - 117/76)  BP(mean): 66 (12 Feb 2025 04:00) (66 - 90)  ABP: --  ABP(mean): --  RR: 18 (12 Feb 2025 04:00) (18 - 20)  SpO2: 100% (12 Feb 2025 04:00) (92% - 100%)    O2 Parameters below as of 11 Feb 2025 23:54  Patient On (Oxygen Delivery Method): nasal cannula            CONSTITUTIONAL:  NAD    ENT:   Airway patent,   Mouth with normal mucosa.   No thrush    EYES:   Pupils equal,   Round and reactive to light.    CARDIAC:   Normal rate,   Regular rhythm.    No edema      RESPIRATORY:   No wheezing  Bilateral BS  Normal chest expansion  Not tachypneic,  No use of accessory muscles    GASTROINTESTINAL:  Abdomen soft,   Non-tender,   No guarding,   + BS    MUSCULOSKELETAL:   Range of motion is not limited,  No clubbing, cyanosis    NEUROLOGICAL:   Alert and oriented   No motor  deficits.    SKIN:   Skin normal color for race,   Warm and dry and intact.   No evidence of rash.        02-11-25 @ 07:01  -  02-12-25 @ 07:00  --------------------------------------------------------  IN:    Lactated Ringers Bolus: 1500 mL    Oral Fluid: 690 mL  Total IN: 2190 mL    OUT:    Voided (mL): 1875 mL  Total OUT: 1875 mL    Total NET: 315 mL          LABS:                            8.5    7.75  )-----------( 203      ( 12 Feb 2025 05:16 )             27.4                                               02-12    138  |  107  |  26[H]  ----------------------------<  75  4.5   |  26  |  0.9    Ca    7.9[L]      12 Feb 2025 05:16  Mg     1.5     02-12    TPro  4.7[L]  /  Alb  3.1[L]  /  TBili  0.6  /  DBili  x   /  AST  52[H]  /  ALT  152[H]  /  AlkPhos  59  02-12      PT/INR - ( 12 Feb 2025 05:16 )   PT: 16.00 sec;   INR: 1.35 ratio                                                Urinalysis Basic - ( 12 Feb 2025 05:16 )    Color: x / Appearance: x / SG: x / pH: x  Gluc: 75 mg/dL / Ketone: x  / Bili: x / Urobili: x   Blood: x / Protein: x / Nitrite: x   Leuk Esterase: x / RBC: x / WBC x   Sq Epi: x / Non Sq Epi: x / Bacteria: x                                                  LIVER FUNCTIONS - ( 12 Feb 2025 05:16 )  Alb: 3.1 g/dL / Pro: 4.7 g/dL / ALK PHOS: 59 U/L / ALT: 152 U/L / AST: 52 U/L / GGT: x                                                                                                                                       MEDICATIONS  (STANDING):  calcium acetate 667 milliGRAM(s) Oral three times a day with meals  chlorhexidine 2% Cloths 1 Application(s) Topical daily  ezetimibe 10 milliGRAM(s) Oral daily  magnesium oxide 400 milliGRAM(s) Oral three times a day with meals  metoprolol tartrate 12.5 milliGRAM(s) Oral two times a day  midodrine 10 milliGRAM(s) Oral every 8 hours  pantoprazole    Tablet 40 milliGRAM(s) Oral daily    MEDICATIONS  (PRN):  lidocaine 5% Ointment 1 Application(s) Topical four times a day PRN pain at [place] area      New X-rays reviewed:                                                                                  ECHO    CXR interpreted by me:       Patient is a 74y old  Male who presents with a chief complaint of Shock (11 Feb 2025 09:14)        Over Night Events:  no events   no complaints   on NC.  Off pressors.        ROS:     All ROS are negative except HPI         PHYSICAL EXAM    ICU Vital Signs Last 24 Hrs  T(C): 36.7 (12 Feb 2025 04:00), Max: 36.8 (11 Feb 2025 11:33)  T(F): 98.1 (12 Feb 2025 04:00), Max: 98.3 (11 Feb 2025 23:54)  HR: 79 (12 Feb 2025 04:00) (79 - 171)  BP: 90/54 (12 Feb 2025 04:00) (90/54 - 117/76)  BP(mean): 66 (12 Feb 2025 04:00) (66 - 90)  ABP: --  ABP(mean): --  RR: 18 (12 Feb 2025 04:00) (18 - 20)  SpO2: 100% (12 Feb 2025 04:00) (92% - 100%)    O2 Parameters below as of 11 Feb 2025 23:54  Patient On (Oxygen Delivery Method): nasal cannula            CONSTITUTIONAL:  NAD    ENT:   Airway patent,   Mouth with normal mucosa.   No thrush    EYES:   Pupils equal,   Round and reactive to light.    CARDIAC:   Normal rate,   Regular rhythm.    No edema      RESPIRATORY:   No wheezing  Bilateral BS  Normal chest expansion  Not tachypneic,  No use of accessory muscles    GASTROINTESTINAL:  Abdomen soft,   Non-tender,   No guarding,   + BS    MUSCULOSKELETAL:   Range of motion is not limited,  No clubbing, cyanosis    NEUROLOGICAL:   Alert and oriented   No motor  deficits.    SKIN:   Skin normal color for race,   Warm and dry   No evidence of rash.        02-11-25 @ 07:01  -  02-12-25 @ 07:00  --------------------------------------------------------  IN:    Lactated Ringers Bolus: 1500 mL    Oral Fluid: 690 mL  Total IN: 2190 mL    OUT:    Voided (mL): 1875 mL  Total OUT: 1875 mL    Total NET: 315 mL          LABS:                            8.5    7.75  )-----------( 203      ( 12 Feb 2025 05:16 )             27.4                                               02-12    138  |  107  |  26[H]  ----------------------------<  75  4.5   |  26  |  0.9    Ca    7.9[L]      12 Feb 2025 05:16  Mg     1.5     02-12    TPro  4.7[L]  /  Alb  3.1[L]  /  TBili  0.6  /  DBili  x   /  AST  52[H]  /  ALT  152[H]  /  AlkPhos  59  02-12      PT/INR - ( 12 Feb 2025 05:16 )   PT: 16.00 sec;   INR: 1.35 ratio                                                Urinalysis Basic - ( 12 Feb 2025 05:16 )    Color: x / Appearance: x / SG: x / pH: x  Gluc: 75 mg/dL / Ketone: x  / Bili: x / Urobili: x   Blood: x / Protein: x / Nitrite: x   Leuk Esterase: x / RBC: x / WBC x   Sq Epi: x / Non Sq Epi: x / Bacteria: x                                                  LIVER FUNCTIONS - ( 12 Feb 2025 05:16 )  Alb: 3.1 g/dL / Pro: 4.7 g/dL / ALK PHOS: 59 U/L / ALT: 152 U/L / AST: 52 U/L / GGT: x                                                                                                                                       MEDICATIONS  (STANDING):  calcium acetate 667 milliGRAM(s) Oral three times a day with meals  chlorhexidine 2% Cloths 1 Application(s) Topical daily  ezetimibe 10 milliGRAM(s) Oral daily  magnesium oxide 400 milliGRAM(s) Oral three times a day with meals  metoprolol tartrate 12.5 milliGRAM(s) Oral two times a day  midodrine 10 milliGRAM(s) Oral every 8 hours  pantoprazole    Tablet 40 milliGRAM(s) Oral daily    MEDICATIONS  (PRN):  lidocaine 5% Ointment 1 Application(s) Topical four times a day PRN pain at [place] area      New X-rays reviewed:                                                                                  ECHO

## 2025-02-12 NOTE — PHARMACOTHERAPY INTERVENTION NOTE - COMMENTS
74yMale    Indication:afib  INR Goal:2-3  Home Dose:5 mg daily  Bridge Therapy:    Current Medications:  calcium acetate 667 milliGRAM(s) Oral three times a day with meals  chlorhexidine 2% Cloths 1 Application(s) Topical daily  ezetimibe 10 milliGRAM(s) Oral daily  lidocaine 5% Ointment 1 Application(s) Topical four times a day PRN  magnesium oxide 400 milliGRAM(s) Oral three times a day with meals  metoprolol tartrate 12.5 milliGRAM(s) Oral two times a day  midodrine 10 milliGRAM(s) Oral every 8 hours  pantoprazole    Tablet 40 milliGRAM(s) Oral daily      hemoglobin 8.5 g/dL (02-12-25 @ 05:16)    hematocrit 27.4 % (02-12-25 @ 05:16)    PLT: 203 K/uL (02-12-25 @ 05:16)    GFR:90 mL/min/1.73m2 (02-12-25 @ 05:16)      Drug Interactions: cefepime   IVPB   100 mL/Hr (02-05-25 @ 07:52)    cefTRIAXone   IVPB   100 mL/Hr (02-06-25 @ 13:15)    meropenem  IVPB   100 mL/Hr (02-06-25 @ 01:18)    meropenem  IVPB   100 mL/Hr (02-06-25 @ 05:32)    vancomycin  IVPB   300 mL/Hr (02-05-25 @ 08:38)    vancomycin  IVPB   250 mL/Hr (02-06-25 @ 13:49)        INR trend  4.00 ratio (02-06-25 @ 04:50)  2.14 ratio (02-07-25 @ 06:30)  1.48 ratio (02-08-25 @ 05:37)  1.31 ratio (02-09-25 @ 05:55)  1.40 ratio (02-10-25 @ 04:30)  1.35 ratio (02-11-25 @ 06:14)  1.35 ratio (02-12-25 @ 05:16)      Warfarin administration history:  warfarin: 3.5 milliGRAM(s) (02-07-25 @ 21:13)  warfarin: 3.5 milliGRAM(s) (02-08-25 @ 21:07)  warfarin: 3.5 milliGRAM(s) (02-09-25 @ 21:19)  warfarin: 5 milliGRAM(s) (02-10-25 @ 21:05)  warfarin: 5 milliGRAM(s) (02-11-25 @ 22:29)        1. INR today is:               [ x  ] below goal ----- This is likely due to warfarin being held for supratherapeutic INR                                            [   ] at goal                                            [   ] above goal ------ This is likely due to        2. Recommend Warfarin    6 mg       PO x 1   3. Obtain INR tomorrow AM

## 2025-02-12 NOTE — CONSULT NOTE ADULT - ASSESSMENT
**INCOMPLETE NOTE**     73 YO M with PMHx of HTN, HLD MR s/p mitral annuloplasty ring, A-fib on Coumadin, HFmrEF, s/p AICD presented with generalized weakness, fatigue, anorexia and poor p.o. intake for the past 5-6 weeks. Admitted to stepdown with undefined shock; now resolved. Presented with Cr. of 5.3, likely pre-renal, now resolved.     TTE 2/4/25: EF 45-50%. LA and RA now well visualized. s/p mitral annuloplasty ring. Mod TR.    IMPRESSION  #HFmrEF; compensated  - IVC non-dilated and collapsing   #Afib; [id number] at least 3  - On coumadin outpatient   #MARIANNE; resolved   #MR s/p mitral annuloplasty ring  #s/p AICD    PLAN  - Wean midodrine  - Increase Metoprolol as tolerated   - Replete Mg. Monitor electrolytes. Keep K > 4 and Mg > 2.2.  73 YO M with PMHx of HTN, HLD MR s/p mitral annuloplasty ring, A-fib on Coumadin, HFmrEF, s/p AICD presented with generalized weakness, fatigue, anorexia and poor p.o. intake for the past 5-6 weeks. Admitted to stepdown with undefined shock; now resolved. Presented with Cr. of 5.3, likely pre-renal, now resolved.     TTE 2/4/25: EF 45-50%. LA and RA now well visualized. s/p mitral annuloplasty ring. Mod TR.    IMPRESSION  #HFmrEF; compensated  - IVC non-dilated and collapsing   #Afib; [id number] at least 3  - On coumadin outpatient   #MARIANNE; resolved   #MR s/p mitral annuloplasty ring  #s/p AICD    PLAN  - Wean midodrine  - Increase Metoprolol as tolerated. Increase to 12.5mg PO Q6 today. If tolerates, increase to 25mg PO Q8 tomorrow and then to 25mg PO Q6. Change to Metoprolol succinate prior to discharge. Target HR of ~ 70 bpm. Hold dose for SBP < 90 mmHg and HR < 65 bpm.   - Start Eliquis 5mg PO BID.   - Start Entresto as tolerated.  - Aldactone and Farxiga can be started outpatient.   - Replete Mg. Monitor electrolytes. Keep K > 4 and Mg > 2.2.     Cardiology team to sign off. Outpatient follow up with Dr. Cunningham.      73 YO M with PMHx of HTN, HLD MR s/p mitral annuloplasty ring, A-fib on Coumadin, HFmrEF, s/p AICD presented with generalized weakness, fatigue, anorexia and poor p.o. intake for the past 5-6 weeks. Admitted to stepdown with shock with undefined etiology; now resolved. Presented with Cr. of 5.3, likely pre-renal, now resolved.     TTE 2/4/25: EF 45-50%. LA and RA now well visualized. s/p mitral annuloplasty ring. Mod TR.    IMPRESSION  #HFmrEF; compensated  - IVC non-dilated and collapsing   #Afib; [id number] at least 3  - On coumadin outpatient   #MARIANNE; resolved   #MR s/p mitral annuloplasty ring  #s/p AICD  #Shock; undefined etiology; resolved     PLAN  - Wean midodrine  - Increase Metoprolol as tolerated. Increase to 12.5mg PO Q6 today. If tolerates, increase to 25mg PO Q8 tomorrow and then to 25mg PO Q6. Change to Metoprolol succinate prior to discharge. Target HR of ~ 70 bpm. Hold dose for SBP < 90 mmHg and HR < 65 bpm.   - Start Eliquis 5mg PO BID.   - Start Entresto as tolerated.  - Aldactone and Farxiga can be started outpatient.   - Replete Mg. Monitor electrolytes. Keep K > 4 and Mg > 2.2.     Cardiology team to sign off. Outpatient follow up with Dr. Cunningham.      75 YO M with PMHx of HTN, HLD MR s/p mitral annuloplasty ring, A-fib on Coumadin, HFmrEF, s/p AICD presented with generalized weakness, fatigue, anorexia and poor p.o. intake for the past 5-6 weeks. Admitted to stepdown with shock with undefined etiology; now resolved. Presented with Cr 5.3, likely pre-renal, now resolved.     TTE 2/4/25: EF 45-50%. LA and RA now well visualized. s/p mitral annuloplasty ring. Mod TR.    IMPRESSION  #HFmrEF; compensated  - IVC non-dilated and collapsing   #Afib; [id number] at least 3  - On coumadin outpatient   #MARIANNE; resolved   #MR s/p mitral annuloplasty ring  #s/p AICD  #Shock; undefined etiology; resolved     PLAN  - Wean midodrine  - Increase Metoprolol as tolerated. Increase to 12.5mg PO Q6 today. If tolerates, increase to 25mg PO Q8 tomorrow and then to 25mg PO Q6  - Change to Metoprolol succinate prior to discharge. Target HR of ~ 70 bpm. Hold dose for SBP < 90 mmHg and HR < 65 bpm.   - Start Eliquis 5mg PO BID.   - Start Entresto as tolerated.  - Aldactone and Farxiga can be started outpatient.   - Replete Mg. Monitor electrolytes. Keep K > 4 and Mg > 2.2.     Cardiology team to sign off. Outpatient follow up with Dr. Cunningham.

## 2025-02-13 LAB
ALBUMIN SERPL ELPH-MCNC: 3.3 G/DL — LOW (ref 3.5–5.2)
ALP SERPL-CCNC: 55 U/L — SIGNIFICANT CHANGE UP (ref 30–115)
ALT FLD-CCNC: 113 U/L — HIGH (ref 0–41)
ANION GAP SERPL CALC-SCNC: 8 MMOL/L — SIGNIFICANT CHANGE UP (ref 7–14)
AST SERPL-CCNC: 34 U/L — SIGNIFICANT CHANGE UP (ref 0–41)
BASOPHILS # BLD AUTO: 0.02 K/UL — SIGNIFICANT CHANGE UP (ref 0–0.2)
BASOPHILS NFR BLD AUTO: 0.3 % — SIGNIFICANT CHANGE UP (ref 0–1)
BILIRUB SERPL-MCNC: 0.4 MG/DL — SIGNIFICANT CHANGE UP (ref 0.2–1.2)
BUN SERPL-MCNC: 19 MG/DL — SIGNIFICANT CHANGE UP (ref 10–20)
CALCIUM SERPL-MCNC: 7.8 MG/DL — LOW (ref 8.4–10.5)
CHLORIDE SERPL-SCNC: 106 MMOL/L — SIGNIFICANT CHANGE UP (ref 98–110)
CO2 SERPL-SCNC: 26 MMOL/L — SIGNIFICANT CHANGE UP (ref 17–32)
CREAT SERPL-MCNC: 1 MG/DL — SIGNIFICANT CHANGE UP (ref 0.7–1.5)
EGFR: 79 ML/MIN/1.73M2 — SIGNIFICANT CHANGE UP
EOSINOPHIL # BLD AUTO: 0.26 K/UL — SIGNIFICANT CHANGE UP (ref 0–0.7)
EOSINOPHIL NFR BLD AUTO: 3.7 % — SIGNIFICANT CHANGE UP (ref 0–8)
GLUCOSE SERPL-MCNC: 75 MG/DL — SIGNIFICANT CHANGE UP (ref 70–99)
HCT VFR BLD CALC: 24 % — LOW (ref 42–52)
HGB BLD-MCNC: 7.5 G/DL — LOW (ref 14–18)
IMM GRANULOCYTES NFR BLD AUTO: 2 % — HIGH (ref 0.1–0.3)
INR BLD: 1.65 RATIO — HIGH (ref 0.65–1.3)
IRON SATN MFR SERPL: 25 % — SIGNIFICANT CHANGE UP (ref 15–50)
IRON SATN MFR SERPL: 55 UG/DL — SIGNIFICANT CHANGE UP (ref 35–150)
LYMPHOCYTES # BLD AUTO: 1.45 K/UL — SIGNIFICANT CHANGE UP (ref 1.2–3.4)
LYMPHOCYTES # BLD AUTO: 20.5 % — SIGNIFICANT CHANGE UP (ref 20.5–51.1)
MAGNESIUM SERPL-MCNC: 1.3 MG/DL — LOW (ref 1.8–2.4)
MCHC RBC-ENTMCNC: 30.7 PG — SIGNIFICANT CHANGE UP (ref 27–31)
MCHC RBC-ENTMCNC: 31.3 G/DL — LOW (ref 32–37)
MCV RBC AUTO: 98.4 FL — HIGH (ref 80–94)
MONOCYTES # BLD AUTO: 0.47 K/UL — SIGNIFICANT CHANGE UP (ref 0.1–0.6)
MONOCYTES NFR BLD AUTO: 6.6 % — SIGNIFICANT CHANGE UP (ref 1.7–9.3)
NEUTROPHILS # BLD AUTO: 4.73 K/UL — SIGNIFICANT CHANGE UP (ref 1.4–6.5)
NEUTROPHILS NFR BLD AUTO: 66.9 % — SIGNIFICANT CHANGE UP (ref 42.2–75.2)
NRBC BLD AUTO-RTO: 0 /100 WBCS — SIGNIFICANT CHANGE UP (ref 0–0)
PLATELET # BLD AUTO: 198 K/UL — SIGNIFICANT CHANGE UP (ref 130–400)
PMV BLD: 10.7 FL — HIGH (ref 7.4–10.4)
POTASSIUM SERPL-MCNC: 4.3 MMOL/L — SIGNIFICANT CHANGE UP (ref 3.5–5)
POTASSIUM SERPL-SCNC: 4.3 MMOL/L — SIGNIFICANT CHANGE UP (ref 3.5–5)
PROT SERPL-MCNC: 4.7 G/DL — LOW (ref 6–8)
PROTHROM AB SERPL-ACNC: 19.7 SEC — HIGH (ref 9.95–12.87)
RBC # BLD: 2.44 M/UL — LOW (ref 4.7–6.1)
RBC # FLD: 12.7 % — SIGNIFICANT CHANGE UP (ref 11.5–14.5)
SODIUM SERPL-SCNC: 140 MMOL/L — SIGNIFICANT CHANGE UP (ref 135–146)
TIBC SERPL-MCNC: 219 UG/DL — LOW (ref 220–430)
UIBC SERPL-MCNC: 164 UG/DL — SIGNIFICANT CHANGE UP (ref 110–370)
WBC # BLD: 7.07 K/UL — SIGNIFICANT CHANGE UP (ref 4.8–10.8)
WBC # FLD AUTO: 7.07 K/UL — SIGNIFICANT CHANGE UP (ref 4.8–10.8)

## 2025-02-13 PROCEDURE — 99233 SBSQ HOSP IP/OBS HIGH 50: CPT

## 2025-02-13 RX ORDER — METOPROLOL SUCCINATE 50 MG/1
12.5 TABLET, EXTENDED RELEASE ORAL
Refills: 0 | Status: DISCONTINUED | OUTPATIENT
Start: 2025-02-13 | End: 2025-02-13

## 2025-02-13 RX ORDER — MAGNESIUM SULFATE 500 MG/ML
2 SYRINGE (ML) INJECTION
Refills: 0 | Status: COMPLETED | OUTPATIENT
Start: 2025-02-13 | End: 2025-02-13

## 2025-02-13 RX ORDER — METOPROLOL SUCCINATE 50 MG/1
25 TABLET, EXTENDED RELEASE ORAL
Refills: 0 | Status: DISCONTINUED | OUTPATIENT
Start: 2025-02-13 | End: 2025-02-16

## 2025-02-13 RX ADMIN — Medication 400 MILLIGRAM(S): at 18:28

## 2025-02-13 RX ADMIN — METOPROLOL SUCCINATE 25 MILLIGRAM(S): 50 TABLET, EXTENDED RELEASE ORAL at 18:30

## 2025-02-13 RX ADMIN — APIXABAN 5 MILLIGRAM(S): 2.5 TABLET, FILM COATED ORAL at 18:28

## 2025-02-13 RX ADMIN — EZETIMIBE 10 MILLIGRAM(S): 10 TABLET ORAL at 12:26

## 2025-02-13 RX ADMIN — MIDODRINE HYDROCHLORIDE 15 MILLIGRAM(S): 5 TABLET ORAL at 21:10

## 2025-02-13 RX ADMIN — METOPROLOL SUCCINATE 12.5 MILLIGRAM(S): 50 TABLET, EXTENDED RELEASE ORAL at 05:18

## 2025-02-13 RX ADMIN — Medication 1 APPLICATION(S): at 12:34

## 2025-02-13 RX ADMIN — Medication 400 MILLIGRAM(S): at 12:26

## 2025-02-13 RX ADMIN — Medication 25 GRAM(S): at 11:17

## 2025-02-13 RX ADMIN — MIDODRINE HYDROCHLORIDE 15 MILLIGRAM(S): 5 TABLET ORAL at 05:17

## 2025-02-13 RX ADMIN — Medication 25 GRAM(S): at 12:26

## 2025-02-13 RX ADMIN — Medication 25 GRAM(S): at 09:02

## 2025-02-13 RX ADMIN — Medication 400 MILLIGRAM(S): at 09:03

## 2025-02-13 RX ADMIN — APIXABAN 5 MILLIGRAM(S): 2.5 TABLET, FILM COATED ORAL at 05:17

## 2025-02-13 RX ADMIN — MIDODRINE HYDROCHLORIDE 15 MILLIGRAM(S): 5 TABLET ORAL at 13:57

## 2025-02-13 RX ADMIN — Medication 40 MILLIGRAM(S): at 12:26

## 2025-02-13 NOTE — PROGRESS NOTE ADULT - SUBJECTIVE AND OBJECTIVE BOX
SUBJECTIVE/OVERNIGHT EVENTS  Today is hospital day 9d. This morning patient was seen and examined at bedside, resting comfortably in bed. No acute or major events overnight.    HPI:    Patient is a 74-year-old male with past medical history of hypertension, dyslipidemia, mitral valve regurg, A-fib on Coumadin,HFrEF with an EF of 45% in december , and status post AICD presenting for weakness. Patient endorses persistent, complaining generalized weakness, fatigue, anorexia and poor p.o. intake for the past 5-6 weeks. Patient also reports a significant weight loss. Patient  states he had recent blood work which showed worsening kidney function, creatinine was 3.1 in  january up from a baseline of 1,2 in december and currently creatinine in 5.3.         · BP   65 mm Hg/45 mm Hg  · Heart Rate  86 /min  · Temp (C)  36.9 Degrees C oral   · SpO2 (%)  98 % on room air      (04 Feb 2025 13:48)      MEDICATIONS  STANDING MEDICATIONS  apixaban 5 milliGRAM(s) Oral two times a day  chlorhexidine 2% Cloths 1 Application(s) Topical daily  ezetimibe 10 milliGRAM(s) Oral daily  lactated ringers. 1000 milliLiter(s) IV Continuous <Continuous>  magnesium oxide 400 milliGRAM(s) Oral three times a day with meals  magnesium sulfate  IVPB 2 Gram(s) IV Intermittent every 2 hours  metoprolol tartrate 12.5 milliGRAM(s) Oral two times a day  midodrine 15 milliGRAM(s) Oral every 8 hours  pantoprazole    Tablet 40 milliGRAM(s) Oral daily    PRN MEDICATIONS  lidocaine 5% Ointment 1 Application(s) Topical four times a day PRN    VITALS  T(F): 98.1 (02-13-25 @ 04:00), Max: 98.8 (02-12-25 @ 16:00)  HR: 78 (02-13-25 @ 05:00) (77 - 89)  BP: 107/57 (02-13-25 @ 05:00) (73/53 - 128/74)  RR: 18 (02-13-25 @ 04:00) (18 - 20)  SpO2: 100% (02-13-25 @ 04:00) (95% - 100%)          PHYSICAL EXAM      GENERAL: No acute distress, well-developed  CHEST/LUNG: Clear to auscultation bilaterally; No wheeze, equal breath sounds bilaterally, respirations nonlabored  HEART: Regular rate and rhythm; No murmurs, rubs, or gallops  ABDOMEN: Soft, nontender, nondistended; Bowel sounds present, no organomegaly  NEUROLOGY: AAOx3, non-focal, moves all extremities spontaneously          PAST MEDICAL & SURGICAL HISTORY:        LABS             7.5    7.07  )-----------( 198      ( 02-13-25 @ 05:34 )             24.0     140  |  106  |  19  -------------------------<  75   02-13-25 @ 05:34  4.3  |  26  |  1.0    Ca      7.8     02-13-25 @ 05:34  Mg     1.3     02-13-25 @ 05:34    TPro  4.7  /  Alb  3.3  /  TBili  0.4  /  DBili  x   /  AST  34  /  ALT  113  /  AlkPhos  55  /  GGT  x     02-13-25 @ 05:34    PT/INR - ( 02-13-25 @ 05:34 )   PT: 19.70 sec[H];   INR: 1.65 ratio[H]      Urinalysis Basic - ( 13 Feb 2025 05:34 )    Color: x / Appearance: x / SG: x / pH: x  Gluc: 75 mg/dL / Ketone: x  / Bili: x / Urobili: x   Blood: x / Protein: x / Nitrite: x   Leuk Esterase: x / RBC: x / WBC x   Sq Epi: x / Non Sq Epi: x / Bacteria: x          IMAGING

## 2025-02-13 NOTE — PROGRESS NOTE ADULT - ASSESSMENT
Patient is a 74-year-old male with past medical history of hypertension, dyslipidemia, mitral valve regurg, h/o MV annuloplasty ring in 1999,  A-fib on Coumadin, HFrEF with an EF of 45% in december , and status post AICD presenting for weakness. Found to have worsening reanal fxn Scr 5.3, from baseline low 1s. Patient initially admitted to MICU due to hypotension, requiring multiple pressors. No evidence of infection s/p empiric abx, AI ruled out, s/p multiple boluses, weaned off pressors and downgraded to SDU     # Hypovolemic Shock due to polypharmacy / A-fib with RVR / HFrEF 45%/ h/o AICD  -  off pressors now  -  c/w midodrine 15 q8, wean as tolerated   - TSH low, Ft4 normal, t3 low, endocrine f/u   - TTE: EF 45-50% with moderate TR only  - case discussed with pt cardio Dr Cunningham ,  echo unchanged since Nov 2023, cath was ok a few years ago.   - consulted by cardiology today, recommendations noted:  - Wean midodrine  - Increase Metoprolol as tolerated. Increase to 12.5mg PO Q6 today. If tolerates, increase to 25mg PO Q8 tomorrow and then to 25mg PO Q6. Change to Metoprolol succinate prior to discharge. Target HR of ~ 70 bpm. Hold dose for SBP < 90 mmHg and HR < 65 bpm.   - Start Eliquis 5mg PO BID (no contraindication to swithching from coumadin)  - continue to hold entresto for now, can start as outpatient as well with his cardiologist   - Aldactone and Farxiga can be started outpatient.   - Replete Mg. Monitor electrolytes. Keep K > 4 and Mg > 2.2.    # Orthostatic Hypotension   - check orthostatic VS Q shift   - ACE wrap on LE, abdominal binder   - fall precautions     # MARIANNE /HAGMA/ h/o nephrolithiasis   - resolved  - d/c Calcium Acetate   - monitor BUN/cr and urine output   - keep MAP above 65  -encourage po hydration   - CT noted: No acute abnormality in the abdomen and pelvis. No hydronephrosis, Left-sided nephrolithiasis.  -nephro signed off       # Transaminitis   - no RUQ pain, monitor LFTs   - avoid hepatotoxic meds, BP control and trend LFTs  -improving     # Gi prophylaxis   - on PPIs    #Progress Note Handoff  Pending (specify):  monitor tele for now, bp still on softer side and just downgraded, increase lopressor, replete Mg, PT f/u, possible dc 24-48   Family discussion: I spoke with pt, he agreed with a plan of care

## 2025-02-13 NOTE — PROGRESS NOTE ADULT - SUBJECTIVE AND OBJECTIVE BOX
ALLIE TORRE 74y Male  MRN#: [id number]     Hospital Day: 9d      SUBJECTIVE  No acute events overnight, pt seen and examined this morning.                                          ----------------------------------------------------------  OBJECTIVE  PAST MEDICAL & SURGICAL HISTORY                                            -----------------------------------------------------------  ALLERGIES:  No Known Allergies                                            ------------------------------------------------------------    HOME MEDICATIONS  Home Medications:  Aldactone 25 mg oral tablet: 1 tab(s) orally once a day (at bedtime) (04 Feb 2025 15:09)  Entresto 97 mg-103 mg oral tablet: 1 tab(s) orally 2 times a day (04 Feb 2025 15:09)  ezetimibe 10 mg oral tablet: 1 tab(s) orally once a day (04 Feb 2025 15:09)  Farxiga 10 mg oral tablet: 1 tab(s) orally once a day (04 Feb 2025 15:09)  metoprolol succinate 100 mg oral tablet, extended release: 1 tab(s) orally once a day (in the afternoon) (04 Feb 2025 15:09)  metoprolol succinate 200 mg oral capsule, extended release: 1 cap(s) orally once a day (in the morning) (04 Feb 2025 15:10)  NexIUM 20 mg oral delayed release capsule: 1 cap(s) orally once a day (at bedtime) (04 Feb 2025 15:10)  rosuvastatin 40 mg oral tablet: 1 tab(s) orally once a day (04 Feb 2025 15:10)  warfarin 5 mg oral tablet: 1 tab(s) orally once a day (04 Feb 2025 15:10)                           MEDICATIONS:  STANDING MEDICATIONS  apixaban 5 milliGRAM(s) Oral two times a day  chlorhexidine 2% Cloths 1 Application(s) Topical daily  ezetimibe 10 milliGRAM(s) Oral daily  magnesium oxide 400 milliGRAM(s) Oral three times a day with meals  magnesium sulfate  IVPB 2 Gram(s) IV Intermittent every 2 hours  metoprolol tartrate 12.5 milliGRAM(s) Oral two times a day  midodrine 15 milliGRAM(s) Oral every 8 hours  pantoprazole    Tablet 40 milliGRAM(s) Oral daily    PRN MEDICATIONS  lidocaine 5% Ointment 1 Application(s) Topical four times a day PRN                                            ------------------------------------------------------------  VITAL SIGNS: Last 24 Hours  T(C): 36.7 (13 Feb 2025 04:00), Max: 37.1 (12 Feb 2025 16:00)  T(F): 98.1 (13 Feb 2025 04:00), Max: 98.8 (12 Feb 2025 16:00)  HR: 78 (13 Feb 2025 05:00) (77 - 89)  BP: 107/57 (13 Feb 2025 05:00) (73/53 - 128/74)  BP(mean): 74 (13 Feb 2025 05:00) (59 - 93)  RR: 18 (13 Feb 2025 04:00) (18 - 20)  SpO2: 100% (13 Feb 2025 04:00) (95% - 100%)      02-12-25 @ 07:01  -  02-13-25 @ 07:00  --------------------------------------------------------  IN: 1265 mL / OUT: 2085 mL / NET: -820 mL    02-13-25 @ 07:01  -  02-13-25 @ 12:13  --------------------------------------------------------  IN: 847 mL / OUT: 0 mL / NET: 847 mL                                             --------------------------------------------------------------  LABS:                        7.5    7.07  )-----------( 198      ( 13 Feb 2025 05:34 )             24.0     02-13    140  |  106  |  19  ----------------------------<  75  4.3   |  26  |  1.0    Ca    7.8[L]      13 Feb 2025 05:34  Mg     1.3     02-13    TPro  4.7[L]  /  Alb  3.3[L]  /  TBili  0.4  /  DBili  x   /  AST  34  /  ALT  113[H]  /  AlkPhos  55  02-13    PT/INR - ( 13 Feb 2025 05:34 )   PT: 19.70 sec;   INR: 1.65 ratio           Urinalysis Basic - ( 13 Feb 2025 05:34 )    Color: x / Appearance: x / SG: x / pH: x  Gluc: 75 mg/dL / Ketone: x  / Bili: x / Urobili: x   Blood: x / Protein: x / Nitrite: x   Leuk Esterase: x / RBC: x / WBC x   Sq Epi: x / Non Sq Epi: x / Bacteria: x                                                            -------------------------------------------------------------  RADIOLOGY:  CXR      CT                                            --------------------------------------------------------------    PHYSICAL EXAM:  GENERAL: NAD, lying in bed comfortably  CHEST/LUNG: Clear to auscultation bilaterally; No rales, rhonchi, wheezing, or rubs. Unlabored respirations  HEART: Regular rate and rhythm; No murmurs, rubs, or gallops  ABDOMEN: Soft, nontender, nondistended  EXTREMITIES:  No clubbing, cyanosis, or edema  NERVOUS SYSTEM:  A&Ox3

## 2025-02-13 NOTE — PROGRESS NOTE ADULT - ASSESSMENT
Patient is a 74-year-old male with past medical history of hypertension, dyslipidemia, mitral valve regurg, A-fib on Coumadin,HFrEF with an EF of 45% in december , and status post AICD presenting for weakness. Patient endorses persistent, complaining generalized weakness, fatigue, anorexia and poor p.o. intake for the past 5-6 weeks. Patient also reports a significant weight loss. Patient  states he had recent blood work which showed worsening kidney function, creatinine was 3.1 in  january up from a baseline of 1,2 in december and currently creatinine in 5.3.   Patient also hypotensive s/p hypotension, suspicion of AI, had rapid afib started on amio now d/c'ed .    #Shock, unclear source - now resolved  #Suspicion of AI - now ruled out  -Pt was hypotensive, no fever, no leukocytosis  -Hb stable, so signs of bleeding  -TTE: EF 45-50% with moderate TR only  -UA negative  -CXR normal  -CTAP negative  -RVP negative, MRSA negative  -Blood cx negative  -After having rapid rate Afib on 2/5, switched to phenylephrine and vasopressin was added, on balbir low dose (improved requirements), off vaso   -Cx taken, started empirically on cefepime, then ora, now ID cs recommending stopping all ABx as no source and cultures all negative  -TSH low 0.22, but free T4 normal so likely sick euthyroid syndrome, F/U T3 level  -AM cortisol intermediate at 4.8 (inappropriately low) so ordered ACTH level and did stim test  -Started on dexa 6 mg, initially then was stopped  -Endo consulted- f/u recs   -Stim test revealed no adrenal gland issue as cortisol level increased appropriately to >18. Dexa stopped   f/u  Follow-up Endocrinology recommendations.   Pt hypotensive 2/10. 1L LR bolus given. Currently on midodrine 20q8Hr     #Afib, was in RVR, now rate controlled  -Amio loaded and started on amio drip on 2/5, switched to PO amio then stopped on 2/10 (as per EP recs)  -Now rate controlled  -On metoprolol 200mg at home, resumed now at 12.5 mg BID  -On coumadin at home 5 mg daily (since it's a valvular Afib), previously held for high INR but now resumed  Pt currently subtherapeutic w/ INR 1.2. Will give 5mg tonight. If still subtherapeutic tomorrow, will inc to 7.5mg.   -F/u INR daily  -Amio now stopped so no need for adjusting warfarin dose anymore    #MARIANNE - resolved  #HAGMA  -Crea trended up as per patient from 1.2 baseline, to 3.1 in Jan, and now 5.3 on admission  -During stay it trended down back to baseline (1.2-1.3)  - Trend daily  -Holding home aldactone, ACEi, entresto and farxiga, to be resumed progressively  -Not euglycemic DKA as BHB negative  -Art inserted for accurate I/O, removed successfully  -PO HCO3 started, now stopped  -Midodrine 10mg q8- wean as tolerated   -KBUS negative  -MM w/u follow up    #HFimpEF  -TTE showed EF 45-50%, mod TR, s/p mitral annuloplasty  -GDMT held as pt was in shock, would resume progressively   Currently restarted metop  f/u cardio recs     #Diet: DASH  #DVT pro: coumadin as per INR  #GI pro: Stop GI PPx, no longer on steroids.   #Activity: as tolerated  #Dispo: SDU  #Code status: Full code as per patient   Patient is a 74-year-old male with past medical history of hypertension, dyslipidemia, mitral valve regurg, A-fib on Coumadin,HFrEF with an EF of 45% in december , and status post AICD presenting for weakness. Patient endorses persistent, complaining generalized weakness, fatigue, anorexia and poor p.o. intake for the past 5-6 weeks. Patient also reports a significant weight loss. Patient  states he had recent blood work which showed worsening kidney function, creatinine was 3.1 in  january up from a baseline of 1,2 in december and currently creatinine in 5.3.   Patient also hypotensive s/p hypotension, suspicion of AI, had rapid afib started on amio now d/c'ed .    #Hypotensive Shock- off pressors  #Suspicion of AI -  ruled out  -Pt was hypotensive, no fever, no leukocytosis  -Hb stable, so signs of bleeding  -TTE: EF 45-50% with moderate TR only  -UA negative  -CXR normal  -CTAP negative  -RVP negative, MRSA negative  -Blood cx negative  -After having rapid rate Afib on 2/5, switched to phenylephrine and vasopressin was added, on balbir low dose (improved requirements), off vaso   -Cx taken, started empirically on cefepime, then ora, now ID cs recommending stopping all ABx as no source and cultures all negative  -TSH low 0.22, T3 low, but free T4 normal so likely sick euthyroid syndrome  -AM cortisol intermediate at 4.8 (inappropriately low) so ordered ACTH level and did stimulation test- neg. s/p dexa  -Endo following, fu recs for thyroid ds  -Cw midodrine 15q8, wean as tolerated  -orthostats negative    #Afib, was in RVR, now rate controlled  #HFmrEf s/p AICD  -Amio loaded and started on amio drip on 2/5, switched to PO amio then stopped on 2/10 (as per EP recs)  -Now rate controlled  -TTE showed EF 45-50%, mod TR, s/p mitral annuloplasty  -On metoprolol 200mg at home, resumed now at 12.5 mg BID  -cadio following: increase metoprolol as tolerated, start entresto, eliquis  -was on coumadin, resumed eliquis on 2/12    #MARIANNE - resolved  #HAGMA  -Crea trended up as per patient from 1.2 baseline, to 3.1 in Jan, and now 5.3 on admission  -During stay it trended down back to baseline (1.2-1.3)  - Trend daily  -Holding home aldactone, ACEi, entresto and farxiga, to be resumed progressively  -Not euglycemic DKA as BHB negative  -Art inserted for accurate I/O, removed successfully  -PO HCO3 started, now stopped  -KBUS negative    #Diet: DASH  #DVT pro: eliquis  #GI pro: Stop GI PPx, no longer on steroids.   #Activity: as tolerated  #Dispo: tele  #Code status: Full code as per patient    Pending:endo fu, better BP readings   Patient is a 74-year-old male with past medical history of hypertension, dyslipidemia, mitral valve regurg, A-fib on Coumadin,HFrEF with an EF of 45% in december , and status post AICD presenting for weakness. Patient endorses persistent, complaining generalized weakness, fatigue, anorexia and poor p.o. intake for the past 5-6 weeks. Patient also reports a significant weight loss. Patient  states he had recent blood work which showed worsening kidney function, creatinine was 3.1 in  january up from a baseline of 1,2 in december and currently creatinine in 5.3.   Patient also hypotensive s/p hypotension, suspicion of AI, had rapid afib started on amio now d/c'ed .    #Hypotensive Shock- off pressors  #Suspicion of AI -  ruled out  -Pt was hypotensive, no fever, no leukocytosis  -Hb stable, so signs of bleeding  -TTE: EF 45-50% with moderate TR only  -UA negative  -CXR normal  -CTAP negative  -RVP negative, MRSA negative  -Blood cx negative  -After having rapid rate Afib on 2/5, switched to phenylephrine and vasopressin was added, on balbir low dose (improved requirements), off vaso   -Cx taken, started empirically on cefepime, then ora, now ID cs recommending stopping all ABx as no source and cultures all negative  -TSH low 0.22, T3 low, but free T4 normal so likely sick euthyroid syndrome  -AM cortisol intermediate at 4.8 (inappropriately low) so ordered ACTH level and did stimulation test- neg. s/p dexa  -Endo following, fu recs for thyroid ds  -Cw midodrine 15q8, wean as tolerated  -orthostats negative    #Afib, was in RVR, now rate controlled  #HFmrEf s/p AICD  -Amio loaded and started on amio drip on 2/5, switched to PO amio then stopped on 2/10 (as per EP recs)  -Now rate controlled  -TTE showed EF 45-50%, mod TR, s/p mitral annuloplasty  -On metoprolol 200mg at home, resumed now at 12.5 mg BID  -cadio following: increase metoprolol as tolerated, start entresto, eliquis  -was on coumadin, resumed eliquis on 2/12, hold entresto for now    #MARIANNE - resolved  #HAGMA  -Crea trended up as per patient from 1.2 baseline, to 3.1 in Jan, and now 5.3 on admission  -During stay it trended down back to baseline (1.2-1.3)  - Trend daily  -Holding home aldactone, ACEi, entresto and farxiga, to be resumed progressively  -Not euglycemic DKA as BHB negative  -Art inserted for accurate I/O, removed successfully  -PO HCO3 started, now stopped  -KBUS negative    #Diet: DASH  #DVT pro: eliquis  #GI pro: Stop GI PPx, no longer on steroids.   #Activity: as tolerated  #Dispo: tele  #Code status: Full code as per patient    Pending:endo fu, better BP readings

## 2025-02-14 LAB
ALBUMIN SERPL ELPH-MCNC: 3.2 G/DL — LOW (ref 3.5–5.2)
ALP SERPL-CCNC: 55 U/L — SIGNIFICANT CHANGE UP (ref 30–115)
ALT FLD-CCNC: 90 U/L — HIGH (ref 0–41)
ANION GAP SERPL CALC-SCNC: 6 MMOL/L — LOW (ref 7–14)
AST SERPL-CCNC: 27 U/L — SIGNIFICANT CHANGE UP (ref 0–41)
BASOPHILS # BLD AUTO: 0.03 K/UL — SIGNIFICANT CHANGE UP (ref 0–0.2)
BASOPHILS NFR BLD AUTO: 0.4 % — SIGNIFICANT CHANGE UP (ref 0–1)
BILIRUB SERPL-MCNC: 0.8 MG/DL — SIGNIFICANT CHANGE UP (ref 0.2–1.2)
BUN SERPL-MCNC: 16 MG/DL — SIGNIFICANT CHANGE UP (ref 10–20)
CALCIUM SERPL-MCNC: 8.5 MG/DL — SIGNIFICANT CHANGE UP (ref 8.4–10.4)
CHLORIDE SERPL-SCNC: 107 MMOL/L — SIGNIFICANT CHANGE UP (ref 98–110)
CO2 SERPL-SCNC: 27 MMOL/L — SIGNIFICANT CHANGE UP (ref 17–32)
CREAT SERPL-MCNC: 0.9 MG/DL — SIGNIFICANT CHANGE UP (ref 0.7–1.5)
EGFR: 90 ML/MIN/1.73M2 — SIGNIFICANT CHANGE UP
EOSINOPHIL # BLD AUTO: 0.24 K/UL — SIGNIFICANT CHANGE UP (ref 0–0.7)
EOSINOPHIL NFR BLD AUTO: 3.4 % — SIGNIFICANT CHANGE UP (ref 0–8)
FERRITIN SERPL-MCNC: 632 NG/ML — HIGH (ref 30–400)
FOLATE SERPL-MCNC: 5.5 NG/ML — SIGNIFICANT CHANGE UP
GLUCOSE SERPL-MCNC: 81 MG/DL — SIGNIFICANT CHANGE UP (ref 70–99)
HCT VFR BLD CALC: 24.2 % — LOW (ref 42–52)
HGB BLD-MCNC: 7.5 G/DL — LOW (ref 14–18)
IMM GRANULOCYTES NFR BLD AUTO: 1.4 % — HIGH (ref 0.1–0.3)
INR BLD: 1.38 RATIO — HIGH (ref 0.65–1.3)
LYMPHOCYTES # BLD AUTO: 1.26 K/UL — SIGNIFICANT CHANGE UP (ref 1.2–3.4)
LYMPHOCYTES # BLD AUTO: 17.7 % — LOW (ref 20.5–51.1)
MAGNESIUM SERPL-MCNC: 1.7 MG/DL — LOW (ref 1.8–2.4)
MCHC RBC-ENTMCNC: 30.9 PG — SIGNIFICANT CHANGE UP (ref 27–31)
MCHC RBC-ENTMCNC: 31 G/DL — LOW (ref 32–37)
MCV RBC AUTO: 99.6 FL — HIGH (ref 80–94)
MONOCYTES # BLD AUTO: 0.52 K/UL — SIGNIFICANT CHANGE UP (ref 0.1–0.6)
MONOCYTES NFR BLD AUTO: 7.3 % — SIGNIFICANT CHANGE UP (ref 1.7–9.3)
NEUTROPHILS # BLD AUTO: 4.95 K/UL — SIGNIFICANT CHANGE UP (ref 1.4–6.5)
NEUTROPHILS NFR BLD AUTO: 69.8 % — SIGNIFICANT CHANGE UP (ref 42.2–75.2)
NRBC BLD AUTO-RTO: 0 /100 WBCS — SIGNIFICANT CHANGE UP (ref 0–0)
PLATELET # BLD AUTO: 205 K/UL — SIGNIFICANT CHANGE UP (ref 130–400)
PMV BLD: 10.7 FL — HIGH (ref 7.4–10.4)
POTASSIUM SERPL-MCNC: 5 MMOL/L — SIGNIFICANT CHANGE UP (ref 3.5–5)
POTASSIUM SERPL-SCNC: 5 MMOL/L — SIGNIFICANT CHANGE UP (ref 3.5–5)
PROT SERPL-MCNC: 4.9 G/DL — LOW (ref 6–8)
PROTHROM AB SERPL-ACNC: 16.4 SEC — HIGH (ref 9.95–12.87)
RBC # BLD: 2.43 M/UL — LOW (ref 4.7–6.1)
RBC # FLD: 13.4 % — SIGNIFICANT CHANGE UP (ref 11.5–14.5)
SODIUM SERPL-SCNC: 140 MMOL/L — SIGNIFICANT CHANGE UP (ref 135–146)
VIT B12 SERPL-MCNC: 414 PG/ML — SIGNIFICANT CHANGE UP (ref 232–1245)
WBC # BLD: 7.1 K/UL — SIGNIFICANT CHANGE UP (ref 4.8–10.8)
WBC # FLD AUTO: 7.1 K/UL — SIGNIFICANT CHANGE UP (ref 4.8–10.8)

## 2025-02-14 PROCEDURE — 99233 SBSQ HOSP IP/OBS HIGH 50: CPT

## 2025-02-14 RX ORDER — MAGNESIUM SULFATE 500 MG/ML
2 SYRINGE (ML) INJECTION ONCE
Refills: 0 | Status: COMPLETED | OUTPATIENT
Start: 2025-02-14 | End: 2025-02-14

## 2025-02-14 RX ADMIN — APIXABAN 5 MILLIGRAM(S): 2.5 TABLET, FILM COATED ORAL at 05:16

## 2025-02-14 RX ADMIN — EZETIMIBE 10 MILLIGRAM(S): 10 TABLET ORAL at 12:28

## 2025-02-14 RX ADMIN — METOPROLOL SUCCINATE 25 MILLIGRAM(S): 50 TABLET, EXTENDED RELEASE ORAL at 17:42

## 2025-02-14 RX ADMIN — Medication 400 MILLIGRAM(S): at 17:43

## 2025-02-14 RX ADMIN — METOPROLOL SUCCINATE 25 MILLIGRAM(S): 50 TABLET, EXTENDED RELEASE ORAL at 05:15

## 2025-02-14 RX ADMIN — Medication 400 MILLIGRAM(S): at 09:09

## 2025-02-14 RX ADMIN — MIDODRINE HYDROCHLORIDE 15 MILLIGRAM(S): 5 TABLET ORAL at 13:00

## 2025-02-14 RX ADMIN — APIXABAN 5 MILLIGRAM(S): 2.5 TABLET, FILM COATED ORAL at 17:43

## 2025-02-14 RX ADMIN — MIDODRINE HYDROCHLORIDE 15 MILLIGRAM(S): 5 TABLET ORAL at 21:11

## 2025-02-14 RX ADMIN — Medication 40 MILLIGRAM(S): at 12:28

## 2025-02-14 RX ADMIN — Medication 400 MILLIGRAM(S): at 12:28

## 2025-02-14 RX ADMIN — MIDODRINE HYDROCHLORIDE 15 MILLIGRAM(S): 5 TABLET ORAL at 05:16

## 2025-02-14 RX ADMIN — Medication 25 GRAM(S): at 09:09

## 2025-02-14 RX ADMIN — Medication 1 APPLICATION(S): at 12:28

## 2025-02-14 NOTE — PROGRESS NOTE ADULT - ASSESSMENT
Patient is a 74-year-old male with past medical history of hypertension, dyslipidemia, mitral valve regurg, h/o MV annuloplasty ring in 1999,  A-fib on Coumadin, HFrEF with an EF of 45% in december , and status post AICD presenting for weakness. Found to have worsening reanal fxn Scr 5.3, from baseline low 1s. Patient initially admitted to MICU due to hypotension, requiring multiple pressors. No evidence of infection s/p empiric abx, AI ruled out, s/p multiple boluses, weaned off pressors and downgraded to SDU     # Hypovolemic Shock due to polypharmacy / A-fib with RVR / HFrEF 45%/ h/o AICD  -  off pressors now  -  c/w midodrine 15 q8, wean as tolerated   - TSH low, Ft4 normal, t3 low, euthyroid SS, discussed with endocrine yesterday, no meds needed and outpt f/u for repeat TFTs in 4-6 weeks   - TTE: EF 45-50% with moderate TR only  - case discussed with pt cardio Dr Cunningham ,  echo unchanged since Nov 2023, cath was ok a few years ago.   - consulted by cardiology , recommendations noted  -metoprolol increased yeterday to 25 bid, still very boderline bp with drops to low 80s at times, hold on increasing for today (have recs from cardio to uptitrate, was taking very high dose at home)  - continue to hold entresto for now, can start as outpatient as well with his cardiologist   - Aldactone and Farxiga can be started outpatient.   - Replete Mg. Monitor electrolytes. Keep K > 4 and Mg > 2.2.    # Orthostatic Hypotension   - check orthostatic VS Q shift   - ACE wrap on LE, abdominal binder   - fall precautions     # MARIANNE /HAGMA/ h/o nephrolithiasis   - resolved  - d/c Calcium Acetate   - monitor BUN/cr and urine output   - keep MAP above 65  -encourage po hydration   - CT noted: No acute abnormality in the abdomen and pelvis. No hydronephrosis, Left-sided nephrolithiasis.  -nephro signed off       # Transaminitis   - no RUQ pain, monitor LFTs   - avoid hepatotoxic meds, BP control and trend LFTs  -improving     # Gi prophylaxis   - on PPIs    #Progress Note Handoff  Pending (specify):  monitor on tele for now, bp still on softer side, need stability in vitals while uptitrating home beta blocker, rest of GDMT to be started as outpatient, c/w midodrine, replete Mg, recheck orthostatics   Family discussion: I spoke with pt, he agreed with a plan of care

## 2025-02-14 NOTE — PROGRESS NOTE ADULT - ASSESSMENT
Patient is a 74-year-old male with past medical history of hypertension, dyslipidemia, mitral valve regurg, A-fib on Coumadin,HFrEF with an EF of 45% in december , and status post AICD presenting for weakness. Patient endorses persistent, complaining generalized weakness, fatigue, anorexia and poor p.o. intake for the past 5-6 weeks. Patient also reports a significant weight loss. Patient  states he had recent blood work which showed worsening kidney function, creatinine was 3.1 in  january up from a baseline of 1,2 in december and currently creatinine in 5.3.   Patient also hypotensive s/p hypotension, suspicion of AI, had rapid afib started on amio now d/c'ed .    #Hypotensive Shock- off pressors  #Suspicion of AI -  ruled out  -Pt was hypotensive, no fever, no leukocytosis  -Hb stable, so signs of bleeding  -TTE: EF 45-50% with moderate TR only  -UA negative  -CXR normal  -CTAP negative  -RVP negative, MRSA negative  -Blood cx negative  -After having rapid rate Afib on 2/5, switched to phenylephrine and vasopressin was added, on balbir low dose (improved requirements), off vaso   -Cx taken, started empirically on cefepime, then ora, now ID cs recommending stopping all ABx as no source and cultures all negative  -TSH low 0.22, T3 low, but free T4 normal so likely sick euthyroid syndrome  -AM cortisol intermediate at 4.8 (inappropriately low) so ordered ACTH level and did stimulation test- neg. s/p dexa  -Endo following, OP tfts in 2-3 wks  -Cw midodrine 15q8, wean as tolerated  -orthostats positive. Fu repeat    #Afib, was in RVR, now rate controlled  #HFmrEf s/p AICD  -Amio loaded and started on amio drip on 2/5, switched to PO amio then stopped on 2/10 (as per EP recs)  -Now rate controlled  -TTE showed EF 45-50%, mod TR, s/p mitral annuloplasty  -On metoprolol 200mg at home, resumed now at 25 mg BID  -cadio following: increase metoprolol as tolerated, start entresto, eliquis  -was on coumadin, resumed eliquis on 2/12, hold entresto for now    #MARIANNE - resolved  #HAGMA  -Crea trended up as per patient from 1.2 baseline, to 3.1 in Jan, and now 5.3 on admission  -During stay it trended down back to baseline (1.2-1.3)  - Trend daily  -Holding home aldactone, ACEi, entresto and farxiga, to be resumed progressively  -Not euglycemic DKA as BHB negative  -Art inserted for accurate I/O, removed successfully  -PO HCO3 started, now stopped  -KBUS negative    #Diet: DASH  #DVT pro: eliquis  #GI pro: Stop GI PPx, no longer on steroids.   #Activity: as tolerated  #Dispo: tele  #Code status: Full code as per patient  Pending: orthostats, improvement in bp

## 2025-02-14 NOTE — PROGRESS NOTE ADULT - SUBJECTIVE AND OBJECTIVE BOX
SUBJECTIVE/OVERNIGHT EVENTS  Today is hospital day 10d. This morning patient was seen and examined at bedside, resting comfortably in bed. No acute or major events overnight.    HPI:    Patient is a 74-year-old male with past medical history of hypertension, dyslipidemia, mitral valve regurg, A-fib on Coumadin,HFrEF with an EF of 45% in december , and status post AICD presenting for weakness. Patient endorses persistent, complaining generalized weakness, fatigue, anorexia and poor p.o. intake for the past 5-6 weeks. Patient also reports a significant weight loss. Patient  states he had recent blood work which showed worsening kidney function, creatinine was 3.1 in  january up from a baseline of 1,2 in december and currently creatinine in 5.3.         · BP   65 mm Hg/45 mm Hg  · Heart Rate  86 /min  · Temp (C)  36.9 Degrees C oral   · SpO2 (%)  98 % on room air      (04 Feb 2025 13:48)      MEDICATIONS  STANDING MEDICATIONS  apixaban 5 milliGRAM(s) Oral two times a day  chlorhexidine 2% Cloths 1 Application(s) Topical daily  ezetimibe 10 milliGRAM(s) Oral daily  magnesium oxide 400 milliGRAM(s) Oral three times a day with meals  metoprolol tartrate 25 milliGRAM(s) Oral two times a day  midodrine 15 milliGRAM(s) Oral every 8 hours  pantoprazole    Tablet 40 milliGRAM(s) Oral daily    PRN MEDICATIONS  lidocaine 5% Ointment 1 Application(s) Topical four times a day PRN    VITALS  T(F): 97.9 (02-14-25 @ 04:00), Max: 98.8 (02-13-25 @ 20:24)  HR: 74 (02-14-25 @ 05:00) (74 - 87)  BP: 92/58 (02-14-25 @ 05:00) (80/42 - 96/54)  RR: 18 (02-14-25 @ 05:00) (18 - 18)  SpO2: 98% (02-14-25 @ 05:00) (95% - 100%)          PHYSICAL EXAM      GENERAL: No acute distress, well-developed  CHEST/LUNG: Clear to auscultation bilaterally; No wheeze, equal breath sounds bilaterally, respirations nonlabored  HEART: Regular rate and rhythm; No murmurs, rubs, or gallops  ABDOMEN: Soft, nontender, nondistended; Bowel sounds present, no organomegaly  NEUROLOGY: AAOx3, non-focal, moves all extremities spontaneously        PAST MEDICAL & SURGICAL HISTORY:        LABS             7.5    7.10  )-----------( 205      ( 02-14-25 @ 04:51 )             24.2     140  |  107  |  16  -------------------------<  81   02-14-25 @ 04:51  5.0  |  27  |  0.9    Ca      8.5     02-14-25 @ 04:51  Mg     1.7     02-14-25 @ 04:51    TPro  4.9  /  Alb  3.2  /  TBili  0.8  /  DBili  x   /  AST  27  /  ALT  90  /  AlkPhos  55  /  GGT  x     02-14-25 @ 04:51    PT/INR - ( 02-14-25 @ 04:51 )   PT: 16.40 sec[H];   INR: 1.38 ratio[H]      Urinalysis Basic - ( 14 Feb 2025 04:51 )    Color: x / Appearance: x / SG: x / pH: x  Gluc: 81 mg/dL / Ketone: x  / Bili: x / Urobili: x   Blood: x / Protein: x / Nitrite: x   Leuk Esterase: x / RBC: x / WBC x   Sq Epi: x / Non Sq Epi: x / Bacteria: x          IMAGING

## 2025-02-14 NOTE — PROGRESS NOTE ADULT - SUBJECTIVE AND OBJECTIVE BOX
ALLIE TORRE 74y Male  MRN#: [id number]     Hospital Day: 10d      SUBJECTIVE  No acute events overnight, pt seen and examined this morning.                                          ----------------------------------------------------------  OBJECTIVE  PAST MEDICAL & SURGICAL HISTORY                                            -----------------------------------------------------------  ALLERGIES:  No Known Allergies                                            ------------------------------------------------------------    HOME MEDICATIONS  Home Medications:  Aldactone 25 mg oral tablet: 1 tab(s) orally once a day (at bedtime) (04 Feb 2025 15:09)  Entresto 97 mg-103 mg oral tablet: 1 tab(s) orally 2 times a day (04 Feb 2025 15:09)  ezetimibe 10 mg oral tablet: 1 tab(s) orally once a day (04 Feb 2025 15:09)  Farxiga 10 mg oral tablet: 1 tab(s) orally once a day (04 Feb 2025 15:09)  metoprolol succinate 100 mg oral tablet, extended release: 1 tab(s) orally once a day (in the afternoon) (04 Feb 2025 15:09)  metoprolol succinate 200 mg oral capsule, extended release: 1 cap(s) orally once a day (in the morning) (04 Feb 2025 15:10)  NexIUM 20 mg oral delayed release capsule: 1 cap(s) orally once a day (at bedtime) (04 Feb 2025 15:10)  rosuvastatin 40 mg oral tablet: 1 tab(s) orally once a day (04 Feb 2025 15:10)  warfarin 5 mg oral tablet: 1 tab(s) orally once a day (04 Feb 2025 15:10)                           MEDICATIONS:  STANDING MEDICATIONS  apixaban 5 milliGRAM(s) Oral two times a day  chlorhexidine 2% Cloths 1 Application(s) Topical daily  ezetimibe 10 milliGRAM(s) Oral daily  magnesium oxide 400 milliGRAM(s) Oral three times a day with meals  metoprolol tartrate 25 milliGRAM(s) Oral two times a day  midodrine 15 milliGRAM(s) Oral every 8 hours  pantoprazole    Tablet 40 milliGRAM(s) Oral daily    PRN MEDICATIONS  lidocaine 5% Ointment 1 Application(s) Topical four times a day PRN                                            ------------------------------------------------------------  VITAL SIGNS: Last 24 Hours  T(C): 36.6 (14 Feb 2025 04:00), Max: 37.1 (13 Feb 2025 20:24)  T(F): 97.9 (14 Feb 2025 04:00), Max: 98.8 (13 Feb 2025 20:24)  HR: 74 (14 Feb 2025 05:00) (74 - 87)  BP: 92/58 (14 Feb 2025 05:00) (80/42 - 96/54)  BP(mean): 69 (14 Feb 2025 05:00) (55 - 72)  RR: 18 (14 Feb 2025 05:00) (18 - 18)  SpO2: 98% (14 Feb 2025 05:00) (95% - 100%)      02-13-25 @ 07:01  -  02-14-25 @ 07:00  --------------------------------------------------------  IN: 2407 mL / OUT: 2325 mL / NET: 82 mL                                             --------------------------------------------------------------  LABS:                        7.5    7.10  )-----------( 205      ( 14 Feb 2025 04:51 )             24.2     02-14    140  |  107  |  16  ----------------------------<  81  5.0   |  27  |  0.9    Ca    8.5      14 Feb 2025 04:51  Mg     1.7     02-14    TPro  4.9[L]  /  Alb  3.2[L]  /  TBili  0.8  /  DBili  x   /  AST  27  /  ALT  90[H]  /  AlkPhos  55  02-14    PT/INR - ( 14 Feb 2025 04:51 )   PT: 16.40 sec;   INR: 1.38 ratio           Urinalysis Basic - ( 14 Feb 2025 04:51 )    Color: x / Appearance: x / SG: x / pH: x  Gluc: 81 mg/dL / Ketone: x  / Bili: x / Urobili: x   Blood: x / Protein: x / Nitrite: x   Leuk Esterase: x / RBC: x / WBC x   Sq Epi: x / Non Sq Epi: x / Bacteria: x                                                            -------------------------------------------------------------  RADIOLOGY:  CXR      CT                                            --------------------------------------------------------------    PHYSICAL EXAM:  GENERAL: NAD, lying in bed comfortably  CHEST/LUNG: Clear to auscultation bilaterally; No rales, rhonchi, wheezing, or rubs. Unlabored respirations  HEART: Regular rate and rhythm; No murmurs, rubs, or gallops  ABDOMEN: Soft, nontender, nondistended  EXTREMITIES:  No clubbing, cyanosis, or edema  NERVOUS SYSTEM:  A&Ox3

## 2025-02-15 LAB
ALBUMIN SERPL ELPH-MCNC: 3.3 G/DL — LOW (ref 3.5–5.2)
ALP SERPL-CCNC: 59 U/L — SIGNIFICANT CHANGE UP (ref 30–115)
ALT FLD-CCNC: 73 U/L — HIGH (ref 0–41)
ANION GAP SERPL CALC-SCNC: 8 MMOL/L — SIGNIFICANT CHANGE UP (ref 7–14)
AST SERPL-CCNC: 28 U/L — SIGNIFICANT CHANGE UP (ref 0–41)
BASOPHILS # BLD AUTO: 0.02 K/UL — SIGNIFICANT CHANGE UP (ref 0–0.2)
BASOPHILS NFR BLD AUTO: 0.3 % — SIGNIFICANT CHANGE UP (ref 0–1)
BILIRUB SERPL-MCNC: 0.5 MG/DL — SIGNIFICANT CHANGE UP (ref 0.2–1.2)
BLD GP AB SCN SERPL QL: SIGNIFICANT CHANGE UP
BUN SERPL-MCNC: 17 MG/DL — SIGNIFICANT CHANGE UP (ref 10–20)
CALCIUM SERPL-MCNC: 8.2 MG/DL — LOW (ref 8.4–10.5)
CHLORIDE SERPL-SCNC: 103 MMOL/L — SIGNIFICANT CHANGE UP (ref 98–110)
CO2 SERPL-SCNC: 26 MMOL/L — SIGNIFICANT CHANGE UP (ref 17–32)
CREAT SERPL-MCNC: 0.8 MG/DL — SIGNIFICANT CHANGE UP (ref 0.7–1.5)
EGFR: 93 ML/MIN/1.73M2 — SIGNIFICANT CHANGE UP
EOSINOPHIL # BLD AUTO: 0.23 K/UL — SIGNIFICANT CHANGE UP (ref 0–0.7)
EOSINOPHIL NFR BLD AUTO: 3.2 % — SIGNIFICANT CHANGE UP (ref 0–8)
GLUCOSE SERPL-MCNC: 83 MG/DL — SIGNIFICANT CHANGE UP (ref 70–99)
HCT VFR BLD CALC: 24.7 % — LOW (ref 42–52)
HGB BLD-MCNC: 7.6 G/DL — LOW (ref 14–18)
IMM GRANULOCYTES NFR BLD AUTO: 1.1 % — HIGH (ref 0.1–0.3)
LYMPHOCYTES # BLD AUTO: 1.05 K/UL — LOW (ref 1.2–3.4)
LYMPHOCYTES # BLD AUTO: 14.6 % — LOW (ref 20.5–51.1)
MAGNESIUM SERPL-MCNC: 1.6 MG/DL — LOW (ref 1.8–2.4)
MCHC RBC-ENTMCNC: 30.8 G/DL — LOW (ref 32–37)
MCHC RBC-ENTMCNC: 31.1 PG — HIGH (ref 27–31)
MCV RBC AUTO: 101.2 FL — HIGH (ref 80–94)
MONOCYTES # BLD AUTO: 0.67 K/UL — HIGH (ref 0.1–0.6)
MONOCYTES NFR BLD AUTO: 9.3 % — SIGNIFICANT CHANGE UP (ref 1.7–9.3)
NEUTROPHILS # BLD AUTO: 5.16 K/UL — SIGNIFICANT CHANGE UP (ref 1.4–6.5)
NEUTROPHILS NFR BLD AUTO: 71.5 % — SIGNIFICANT CHANGE UP (ref 42.2–75.2)
NRBC BLD AUTO-RTO: 0 /100 WBCS — SIGNIFICANT CHANGE UP (ref 0–0)
PLATELET # BLD AUTO: 214 K/UL — SIGNIFICANT CHANGE UP (ref 130–400)
PMV BLD: 11.1 FL — HIGH (ref 7.4–10.4)
POTASSIUM SERPL-MCNC: 4.4 MMOL/L — SIGNIFICANT CHANGE UP (ref 3.5–5)
POTASSIUM SERPL-SCNC: 4.4 MMOL/L — SIGNIFICANT CHANGE UP (ref 3.5–5)
PROT SERPL-MCNC: 4.9 G/DL — LOW (ref 6–8)
RBC # BLD: 2.44 M/UL — LOW (ref 4.7–6.1)
RBC # FLD: 14.1 % — SIGNIFICANT CHANGE UP (ref 11.5–14.5)
SODIUM SERPL-SCNC: 137 MMOL/L — SIGNIFICANT CHANGE UP (ref 135–146)
WBC # BLD: 7.21 K/UL — SIGNIFICANT CHANGE UP (ref 4.8–10.8)
WBC # FLD AUTO: 7.21 K/UL — SIGNIFICANT CHANGE UP (ref 4.8–10.8)

## 2025-02-15 PROCEDURE — 99232 SBSQ HOSP IP/OBS MODERATE 35: CPT

## 2025-02-15 RX ORDER — MAGNESIUM SULFATE 500 MG/ML
2 SYRINGE (ML) INJECTION ONCE
Refills: 0 | Status: COMPLETED | OUTPATIENT
Start: 2025-02-15 | End: 2025-02-15

## 2025-02-15 RX ORDER — SACUBITRIL AND VALSARTAN 49; 51 MG/1; MG/1
1 TABLET, FILM COATED ORAL
Refills: 0 | Status: DISCONTINUED | OUTPATIENT
Start: 2025-02-15 | End: 2025-02-16

## 2025-02-15 RX ADMIN — METOPROLOL SUCCINATE 25 MILLIGRAM(S): 50 TABLET, EXTENDED RELEASE ORAL at 05:05

## 2025-02-15 RX ADMIN — Medication 400 MILLIGRAM(S): at 11:07

## 2025-02-15 RX ADMIN — Medication 1 APPLICATION(S): at 11:14

## 2025-02-15 RX ADMIN — Medication 400 MILLIGRAM(S): at 17:19

## 2025-02-15 RX ADMIN — Medication 25 GRAM(S): at 14:37

## 2025-02-15 RX ADMIN — SACUBITRIL AND VALSARTAN 1 TABLET(S): 49; 51 TABLET, FILM COATED ORAL at 11:06

## 2025-02-15 RX ADMIN — APIXABAN 5 MILLIGRAM(S): 2.5 TABLET, FILM COATED ORAL at 05:05

## 2025-02-15 RX ADMIN — EZETIMIBE 10 MILLIGRAM(S): 10 TABLET ORAL at 11:07

## 2025-02-15 RX ADMIN — METOPROLOL SUCCINATE 25 MILLIGRAM(S): 50 TABLET, EXTENDED RELEASE ORAL at 17:19

## 2025-02-15 RX ADMIN — Medication 40 MILLIGRAM(S): at 11:07

## 2025-02-15 RX ADMIN — MIDODRINE HYDROCHLORIDE 15 MILLIGRAM(S): 5 TABLET ORAL at 14:37

## 2025-02-15 RX ADMIN — MIDODRINE HYDROCHLORIDE 15 MILLIGRAM(S): 5 TABLET ORAL at 05:04

## 2025-02-15 RX ADMIN — SACUBITRIL AND VALSARTAN 1 TABLET(S): 49; 51 TABLET, FILM COATED ORAL at 21:44

## 2025-02-15 RX ADMIN — Medication 400 MILLIGRAM(S): at 08:00

## 2025-02-15 RX ADMIN — APIXABAN 5 MILLIGRAM(S): 2.5 TABLET, FILM COATED ORAL at 17:19

## 2025-02-15 RX ADMIN — MIDODRINE HYDROCHLORIDE 15 MILLIGRAM(S): 5 TABLET ORAL at 21:44

## 2025-02-15 NOTE — PROGRESS NOTE ADULT - ASSESSMENT
Patient is a 74-year-old male with past medical history of hypertension, dyslipidemia, mitral valve regurg, A-fib on Coumadin,HFrEF with an EF of 45% in december , and status post AICD presenting for weakness. Patient endorses persistent, complaining generalized weakness, fatigue, anorexia and poor p.o. intake for the past 5-6 weeks. Patient also reports a significant weight loss. Patient  states he had recent blood work which showed worsening kidney function, creatinine was 3.1 in  january up from a baseline of 1,2 in december and currently creatinine in 5.3.     # Hypovolemic Shock due to polypharmacy / A-fib with RVR / HFrEF 45%/ h/o AICD  -  off pressors now  -  c/w midodrine 15 q8, wean as tolerated   - TSH low, Ft4 normal, t3 low, euthyroid SS, discussed with endocrine yesterday, no meds needed and outpt f/u for repeat TFTs in 4-6 weeks   - TTE: EF 45-50% with moderate TR only  - case discussed with pt cardio Dr Cunningham ,  echo unchanged since Nov 2023, cath was ok a few years ago.   - consulted by cardiology , recommendations noted  -metoprolol  25 bid, still very boderline bp with drops to low 80s at times, hold on increasing for today (have recs from cardio to uptitrate, was taking very high dose at home)  - restart entresto   - Aldactone and Farxiga can be started outpatient.   - Replete Mg. Monitor electrolytes. Keep K > 4 and Mg > 2.2.    # Orthostatic Hypotension - improved  - check orthostatic VS Q shift   - ACE wrap on LE, abdominal binder   - fall precautions     #Anemia  -will give 1uprbc 2/15  -anemia of chronic disease  -pt with hx of sigmoidectomy, doesn't want gi or ct abd currently    # MARIANNE /HAGMA/ h/o nephrolithiasis   - resolved  - d/c Calcium Acetate   - monitor BUN/cr and urine output   - keep MAP above 65  -encourage po hydration   - CT noted: No acute abnormality in the abdomen and pelvis. No hydronephrosis, Left-sided nephrolithiasis.  -nephro signed off       # Transaminitis   - no RUQ pain, monitor LFTs   - avoid hepatotoxic meds, BP control and trend LFTs  -improving     # Gi prophylaxis   - on PPIs    #Progress Note Handoff  Pending (specify):  bp, blood transfusion

## 2025-02-15 NOTE — PROGRESS NOTE ADULT - SUBJECTIVE AND OBJECTIVE BOX
SUBJECTIVE/OVERNIGHT EVENTS  Today is hospital day 11d. This morning patient was seen and examined at bedside, resting comfortably in bed. No acute or major events overnight.    HPI:    Patient is a 74-year-old male with past medical history of hypertension, dyslipidemia, mitral valve regurg, A-fib on Coumadin,HFrEF with an EF of 45% in december , and status post AICD presenting for weakness. Patient endorses persistent, complaining generalized weakness, fatigue, anorexia and poor p.o. intake for the past 5-6 weeks. Patient also reports a significant weight loss. Patient  states he had recent blood work which showed worsening kidney function, creatinine was 3.1 in  january up from a baseline of 1,2 in december and currently creatinine in 5.3.         · BP   65 mm Hg/45 mm Hg  · Heart Rate  86 /min  · Temp (C)  36.9 Degrees C oral   · SpO2 (%)  98 % on room air      (04 Feb 2025 13:48)      MEDICATIONS  STANDING MEDICATIONS  apixaban 5 milliGRAM(s) Oral two times a day  chlorhexidine 2% Cloths 1 Application(s) Topical daily  ezetimibe 10 milliGRAM(s) Oral daily  magnesium oxide 400 milliGRAM(s) Oral three times a day with meals  metoprolol tartrate 25 milliGRAM(s) Oral two times a day  midodrine 15 milliGRAM(s) Oral every 8 hours  pantoprazole    Tablet 40 milliGRAM(s) Oral daily  sacubitril 24 mG/valsartan 26 mG 1 Tablet(s) Oral two times a day    PRN MEDICATIONS  lidocaine 5% Ointment 1 Application(s) Topical four times a day PRN    VITALS  T(F): 98.6 (02-15-25 @ 05:00), Max: 98.6 (02-15-25 @ 05:00)  HR: 72 (02-15-25 @ 05:00) (56 - 94)  BP: 99/66 (02-15-25 @ 05:00) (96/60 - 110/68)  RR: 18 (02-15-25 @ 05:00) (18 - 18)  SpO2: 98% (02-15-25 @ 05:00) (96% - 99%)          PHYSICAL EXAM      GENERAL: No acute distress, well-developed  CHEST/LUNG: Clear to auscultation bilaterally; No wheeze, equal breath sounds bilaterally, respirations nonlabored  HEART: Regular rate and rhythm; No murmurs, rubs, or gallops  ABDOMEN: Soft, nontender, nondistended; Bowel sounds present, no organomegaly  NEUROLOGY: AAOx3, non-focal, moves all extremities spontaneously          PAST MEDICAL & SURGICAL HISTORY:        LABS             7.5    7.10  )-----------( 205      ( 02-14-25 @ 04:51 )             24.2     140  |  107  |  16  -------------------------<  81   02-14-25 @ 04:51  5.0  |  27  |  0.9    Ca      8.5     02-14-25 @ 04:51  Mg     1.7     02-14-25 @ 04:51    TPro  4.9  /  Alb  3.2  /  TBili  0.8  /  DBili  x   /  AST  27  /  ALT  90  /  AlkPhos  55  /  GGT  x     02-14-25 @ 04:51    PT/INR - ( 02-14-25 @ 04:51 )   PT: 16.40 sec[H];   INR: 1.38 ratio[H]      Urinalysis Basic - ( 14 Feb 2025 04:51 )    Color: x / Appearance: x / SG: x / pH: x  Gluc: 81 mg/dL / Ketone: x  / Bili: x / Urobili: x   Blood: x / Protein: x / Nitrite: x   Leuk Esterase: x / RBC: x / WBC x   Sq Epi: x / Non Sq Epi: x / Bacteria: x          IMAGING

## 2025-02-15 NOTE — PROGRESS NOTE ADULT - ASSESSMENT
Patient is a 74-year-old male with past medical history of hypertension, dyslipidemia, mitral valve regurg, A-fib on Coumadin,HFrEF with an EF of 45% in december , and status post AICD presenting for weakness. Patient endorses persistent, complaining generalized weakness, fatigue, anorexia and poor p.o. intake for the past 5-6 weeks. Patient also reports a significant weight loss. Patient  states he had recent blood work which showed worsening kidney function, creatinine was 3.1 in  january up from a baseline of 1,2 in december and currently creatinine in 5.3.   Patient also hypotensive s/p hypotension, suspicion of AI, had rapid afib started on amio now d/c'ed .    #Hypotensive Shock- off pressors  #Suspicion of AI -  ruled out  -Pt was hypotensive, no fever, no leukocytosis  -Hb stable, so signs of bleeding  -TTE: EF 45-50% with moderate TR only  -UA negative  -CXR normal  -CTAP negative  -RVP negative, MRSA negative  -Blood cx negative  -After having rapid rate Afib on 2/5, switched to phenylephrine and vasopressin was added, on balbir low dose (improved requirements), off vaso   -Cx taken, started empirically on cefepime, then ora, ID cs recommending stopping all ABx as no source and cultures all negative  -TSH low 0.22, T3 low, but free T4 normal so likely sick euthyroid syndrome  -AM cortisol intermediate at 4.8 (inappropriately low) so ordered ACTH level and did stimulation test- neg. s/p dexa  -Endo following, OP tfts in 2-3 wks  -Cw midodrine 15q8, wean as tolerated  -orthostats positive. Repeat negative 2/14    #Afib, was in RVR, now rate controlled  #HFmrEf s/p AICD  -Amio loaded and started on amio drip on 2/5, switched to PO amio then stopped on 2/10 (as per EP recs)  -Now rate controlled  -TTE showed EF 45-50%, mod TR, s/p mitral annuloplasty  -On metoprolol 200mg at home, resumed now at 25 mg BID  -cadio following: increase metoprolol as tolerated, start entresto, eliquis  -was on coumadin, resumed eliquis on 2/12, started entresto 2/15    #MARIANNE - resolved  #HAGMA  -Crea trended up as per patient from 1.2 baseline, to 3.1 in Jan, and now 5.3 on admission  -During stay it trended down back to baseline (1.2-1.3)  - Trend daily  -Holding home aldactone, ACEi, entresto and farxiga, to be resumed progressively  -Not euglycemic DKA as BHB negative  -Art inserted for accurate I/O, removed successfully  -PO HCO3 started, now stopped  -KBUS negative    #Diet: DASH  #DVT pro: eliquis  #GI pro: Stop GI PPx, no longer on steroids.   #Activity: as tolerated  #Dispo: tele  #Code status: Full code as per patient  Pending: improvement in bp

## 2025-02-15 NOTE — PROGRESS NOTE ADULT - SUBJECTIVE AND OBJECTIVE BOX
Patient is a 74y old  Male who presents with a chief complaint of Shock (11 Feb 2025 09:14)      Patient seen and examined at bedside.    ALLERGIES:  No Known Allergies    MEDICATIONS:  apixaban 5 milliGRAM(s) Oral two times a day  chlorhexidine 2% Cloths 1 Application(s) Topical daily  ezetimibe 10 milliGRAM(s) Oral daily  lidocaine 5% Ointment 1 Application(s) Topical four times a day PRN  magnesium oxide 400 milliGRAM(s) Oral three times a day with meals  magnesium sulfate  IVPB 2 Gram(s) IV Intermittent once  metoprolol tartrate 25 milliGRAM(s) Oral two times a day  midodrine 15 milliGRAM(s) Oral every 8 hours  pantoprazole    Tablet 40 milliGRAM(s) Oral daily  sacubitril 24 mG/valsartan 26 mG 1 Tablet(s) Oral two times a day    Vital Signs Last 24 Hrs  T(F): 97.8 (15 Feb 2025 12:17), Max: 98.6 (15 Feb 2025 05:00)  HR: 77 (15 Feb 2025 12:17) (56 - 99)  BP: 107/64 (15 Feb 2025 12:17) (99/59 - 110/68)  RR: 17 (15 Feb 2025 12:17) (17 - 18)  SpO2: 98% (15 Feb 2025 05:00) (96% - 98%)  I&O's Summary    14 Feb 2025 07:01  -  15 Feb 2025 07:00  --------------------------------------------------------  IN: 1514 mL / OUT: 2305 mL / NET: -791 mL        PHYSICAL EXAM:  General: NAD, A/O x 3  ENT: MMM  Neck: Supple, No JVD  Lungs: Clear to auscultation bilaterally  Cardio: RRR, S1/S2, No murmurs  Abdomen: Soft, Nontender, Nondistended; Bowel sounds present  Extremities: No cyanosis, No edema    LABS:                        7.6    7.21  )-----------( 214      ( 15 Feb 2025 11:06 )             24.7     02-15    137  |  103  |  17  ----------------------------<  83  4.4   |  26  |  0.8    Ca    8.2      15 Feb 2025 11:06  Mg     1.6     02-15    TPro  4.9  /  Alb  3.3  /  TBili  0.5  /  DBili  x   /  AST  28  /  ALT  73  /  AlkPhos  59  02-15      PT/INR - ( 14 Feb 2025 04:51 )   PT: 16.40 sec;   INR: 1.38 ratio                                   Urinalysis Basic - ( 15 Feb 2025 11:06 )    Color: x / Appearance: x / SG: x / pH: x  Gluc: 83 mg/dL / Ketone: x  / Bili: x / Urobili: x   Blood: x / Protein: x / Nitrite: x   Leuk Esterase: x / RBC: x / WBC x   Sq Epi: x / Non Sq Epi: x / Bacteria: x            RADIOLOGY & ADDITIONAL TESTS:    Care Discussed with Consultants/Other Providers:  Patient is a 74y old  Male who presents with a chief complaint of Shock (11 Feb 2025 09:14)      Patient seen and examined at bedside.    ALLERGIES:  No Known Allergies    MEDICATIONS:  apixaban 5 milliGRAM(s) Oral two times a day  chlorhexidine 2% Cloths 1 Application(s) Topical daily  ezetimibe 10 milliGRAM(s) Oral daily  lidocaine 5% Ointment 1 Application(s) Topical four times a day PRN  magnesium oxide 400 milliGRAM(s) Oral three times a day with meals  magnesium sulfate  IVPB 2 Gram(s) IV Intermittent once  metoprolol tartrate 25 milliGRAM(s) Oral two times a day  midodrine 15 milliGRAM(s) Oral every 8 hours  pantoprazole    Tablet 40 milliGRAM(s) Oral daily  sacubitril 24 mG/valsartan 26 mG 1 Tablet(s) Oral two times a day    Vital Signs Last 24 Hrs  T(F): 97.8 (15 Feb 2025 12:17), Max: 98.6 (15 Feb 2025 05:00)  HR: 77 (15 Feb 2025 12:17) (56 - 99)  BP: 107/64 (15 Feb 2025 12:17) (99/59 - 110/68)  RR: 17 (15 Feb 2025 12:17) (17 - 18)  SpO2: 98% (15 Feb 2025 05:00) (96% - 98%)  I&O's Summary    14 Feb 2025 07:01  -  15 Feb 2025 07:00  --------------------------------------------------------  IN: 1514 mL / OUT: 2305 mL / NET: -791 mL        PHYSICAL EXAM:  General: NAD, A/O x 3  ENT: MMM  Neck: Supple, No JVD  Lungs: Clear to auscultation bilaterally  Cardio: RRR, S1/S2, 2/6 murmur  ded; Bowel sounds present  Extremities: No cyanosis, No edema    LABS:                        7.6    7.21  )-----------( 214      ( 15 Feb 2025 11:06 )             24.7     02-15    137  |  103  |  17  ----------------------------<  83  4.4   |  26  |  0.8    Ca    8.2      15 Feb 2025 11:06  Mg     1.6     02-15    TPro  4.9  /  Alb  3.3  /  TBili  0.5  /  DBili  x   /  AST  28  /  ALT  73  /  AlkPhos  59  02-15      PT/INR - ( 14 Feb 2025 04:51 )   PT: 16.40 sec;   INR: 1.38 ratio                                   Urinalysis Basic - ( 15 Feb 2025 11:06 )    Color: x / Appearance: x / SG: x / pH: x  Gluc: 83 mg/dL / Ketone: x  / Bili: x / Urobili: x   Blood: x / Protein: x / Nitrite: x   Leuk Esterase: x / RBC: x / WBC x   Sq Epi: x / Non Sq Epi: x / Bacteria: x            RADIOLOGY & ADDITIONAL TESTS:    Care Discussed with Consultants/Other Providers:

## 2025-02-16 LAB
ALBUMIN SERPL ELPH-MCNC: 3.4 G/DL — LOW (ref 3.5–5.2)
ALP SERPL-CCNC: 63 U/L — SIGNIFICANT CHANGE UP (ref 30–115)
ALT FLD-CCNC: 69 U/L — HIGH (ref 0–41)
ANION GAP SERPL CALC-SCNC: 10 MMOL/L — SIGNIFICANT CHANGE UP (ref 7–14)
AST SERPL-CCNC: 29 U/L — SIGNIFICANT CHANGE UP (ref 0–41)
BASOPHILS # BLD AUTO: 0.03 K/UL — SIGNIFICANT CHANGE UP (ref 0–0.2)
BASOPHILS NFR BLD AUTO: 0.4 % — SIGNIFICANT CHANGE UP (ref 0–1)
BILIRUB SERPL-MCNC: 0.7 MG/DL — SIGNIFICANT CHANGE UP (ref 0.2–1.2)
BUN SERPL-MCNC: 16 MG/DL — SIGNIFICANT CHANGE UP (ref 10–20)
CALCIUM SERPL-MCNC: 8.6 MG/DL — SIGNIFICANT CHANGE UP (ref 8.4–10.4)
CHLORIDE SERPL-SCNC: 107 MMOL/L — SIGNIFICANT CHANGE UP (ref 98–110)
CO2 SERPL-SCNC: 24 MMOL/L — SIGNIFICANT CHANGE UP (ref 17–32)
CREAT SERPL-MCNC: 0.8 MG/DL — SIGNIFICANT CHANGE UP (ref 0.7–1.5)
EGFR: 93 ML/MIN/1.73M2 — SIGNIFICANT CHANGE UP
EOSINOPHIL # BLD AUTO: 0.32 K/UL — SIGNIFICANT CHANGE UP (ref 0–0.7)
EOSINOPHIL NFR BLD AUTO: 4.5 % — SIGNIFICANT CHANGE UP (ref 0–8)
GLUCOSE SERPL-MCNC: 76 MG/DL — SIGNIFICANT CHANGE UP (ref 70–99)
HCT VFR BLD CALC: 28.3 % — LOW (ref 42–52)
HGB BLD-MCNC: 8.7 G/DL — LOW (ref 14–18)
IMM GRANULOCYTES NFR BLD AUTO: 0.7 % — HIGH (ref 0.1–0.3)
LYMPHOCYTES # BLD AUTO: 1.09 K/UL — LOW (ref 1.2–3.4)
LYMPHOCYTES # BLD AUTO: 15.2 % — LOW (ref 20.5–51.1)
MAGNESIUM SERPL-MCNC: 1.6 MG/DL — LOW (ref 1.8–2.4)
MCHC RBC-ENTMCNC: 30.7 G/DL — LOW (ref 32–37)
MCHC RBC-ENTMCNC: 30.9 PG — SIGNIFICANT CHANGE UP (ref 27–31)
MCV RBC AUTO: 100.4 FL — HIGH (ref 80–94)
MONOCYTES # BLD AUTO: 0.53 K/UL — SIGNIFICANT CHANGE UP (ref 0.1–0.6)
MONOCYTES NFR BLD AUTO: 7.4 % — SIGNIFICANT CHANGE UP (ref 1.7–9.3)
NEUTROPHILS # BLD AUTO: 5.17 K/UL — SIGNIFICANT CHANGE UP (ref 1.4–6.5)
NEUTROPHILS NFR BLD AUTO: 71.8 % — SIGNIFICANT CHANGE UP (ref 42.2–75.2)
NRBC BLD AUTO-RTO: 0 /100 WBCS — SIGNIFICANT CHANGE UP (ref 0–0)
PLATELET # BLD AUTO: 225 K/UL — SIGNIFICANT CHANGE UP (ref 130–400)
PMV BLD: 11 FL — HIGH (ref 7.4–10.4)
POTASSIUM SERPL-MCNC: 4.7 MMOL/L — SIGNIFICANT CHANGE UP (ref 3.5–5)
POTASSIUM SERPL-SCNC: 4.7 MMOL/L — SIGNIFICANT CHANGE UP (ref 3.5–5)
PROT SERPL-MCNC: 5.4 G/DL — LOW (ref 6–8)
RBC # BLD: 2.82 M/UL — LOW (ref 4.7–6.1)
RBC # FLD: 14.7 % — HIGH (ref 11.5–14.5)
SODIUM SERPL-SCNC: 141 MMOL/L — SIGNIFICANT CHANGE UP (ref 135–146)
WBC # BLD: 7.19 K/UL — SIGNIFICANT CHANGE UP (ref 4.8–10.8)
WBC # FLD AUTO: 7.19 K/UL — SIGNIFICANT CHANGE UP (ref 4.8–10.8)

## 2025-02-16 PROCEDURE — 99232 SBSQ HOSP IP/OBS MODERATE 35: CPT

## 2025-02-16 RX ORDER — METOPROLOL SUCCINATE 50 MG/1
25 TABLET, EXTENDED RELEASE ORAL THREE TIMES A DAY
Refills: 0 | Status: DISCONTINUED | OUTPATIENT
Start: 2025-02-16 | End: 2025-02-17

## 2025-02-16 RX ORDER — MAGNESIUM SULFATE 500 MG/ML
2 SYRINGE (ML) INJECTION ONCE
Refills: 0 | Status: COMPLETED | OUTPATIENT
Start: 2025-02-16 | End: 2025-02-16

## 2025-02-16 RX ADMIN — Medication 400 MILLIGRAM(S): at 17:41

## 2025-02-16 RX ADMIN — Medication 400 MILLIGRAM(S): at 11:26

## 2025-02-16 RX ADMIN — SACUBITRIL AND VALSARTAN 1 TABLET(S): 49; 51 TABLET, FILM COATED ORAL at 05:19

## 2025-02-16 RX ADMIN — EZETIMIBE 10 MILLIGRAM(S): 10 TABLET ORAL at 11:26

## 2025-02-16 RX ADMIN — METOPROLOL SUCCINATE 25 MILLIGRAM(S): 50 TABLET, EXTENDED RELEASE ORAL at 05:20

## 2025-02-16 RX ADMIN — Medication 40 MILLIGRAM(S): at 11:26

## 2025-02-16 RX ADMIN — Medication 25 GRAM(S): at 14:39

## 2025-02-16 RX ADMIN — MIDODRINE HYDROCHLORIDE 15 MILLIGRAM(S): 5 TABLET ORAL at 14:38

## 2025-02-16 RX ADMIN — APIXABAN 5 MILLIGRAM(S): 2.5 TABLET, FILM COATED ORAL at 05:20

## 2025-02-16 RX ADMIN — MIDODRINE HYDROCHLORIDE 15 MILLIGRAM(S): 5 TABLET ORAL at 05:20

## 2025-02-16 RX ADMIN — Medication 400 MILLIGRAM(S): at 08:50

## 2025-02-16 RX ADMIN — APIXABAN 5 MILLIGRAM(S): 2.5 TABLET, FILM COATED ORAL at 17:42

## 2025-02-16 RX ADMIN — MIDODRINE HYDROCHLORIDE 15 MILLIGRAM(S): 5 TABLET ORAL at 22:31

## 2025-02-16 RX ADMIN — METOPROLOL SUCCINATE 25 MILLIGRAM(S): 50 TABLET, EXTENDED RELEASE ORAL at 22:33

## 2025-02-16 NOTE — CHART NOTE - NSCHARTNOTEFT_GEN_A_CORE
Pt became hypotensive after lunch.  Not symptomatic.  Enteresto stopped.  Continue metoprolol with parameters.

## 2025-02-16 NOTE — PROGRESS NOTE ADULT - ASSESSMENT
Patient is a 74-year-old male with past medical history of hypertension, dyslipidemia, mitral valve regurg, A-fib on Coumadin,HFrEF with an EF of 45% in december , and status post AICD presenting for weakness. Patient endorses persistent, complaining generalized weakness, fatigue, anorexia and poor p.o. intake for the past 5-6 weeks. Patient also reports a significant weight loss. Patient  states he had recent blood work which showed worsening kidney function, creatinine was 3.1 in  january up from a baseline of 1,2 in december and currently creatinine in 5.3.     # Hypovolemic Shock due to polypharmacy / A-fib with RVR / HFrEF 45%/ h/o AICD  -  off pressors now  -  c/w midodrine 15 q8, wean as tolerated   - TSH low, Ft4 normal, t3 low, euthyroid SS, discussed with endocrine yesterday, no meds needed and outpt f/u for repeat TFTs in 4-6 weeks   - TTE: EF 45-50% with moderate TR only  - case discussed with pt cardio Dr Cunningham ,  echo unchanged since Nov 2023, cath was ok a few years ago.   - consulted by cardiology , recommendations noted  -metoprolol  25 increased to tid 2/16, still very boderline bp with drops to low 80s at times (have recs from cardio to uptitrate, was taking very high dose at home)  - restart entresto 2/15  - Aldactone and Farxiga can be started outpatient.   - Replete Mg. Monitor electrolytes. Keep K > 4 and Mg > 2.2.    # Orthostatic Hypotension - improved  - check orthostatic VS Q shift   - ACE wrap on LE, abdominal binder   - fall precautions     #Anemia  -gave 1uprbc 2/15  -anemia of chronic disease  -pt with hx of sigmoidectomy, pt is refusing a repeat CT abd/pelvis or to see GI at this time    # MARIANNE /HAGMA/ h/o nephrolithiasis   - resolved  - d/c Calcium Acetate   - monitor BUN/cr and urine output   - keep MAP above 65  -encourage po hydration   - CT noted: No acute abnormality in the abdomen and pelvis. No hydronephrosis, Left-sided nephrolithiasis.  -nephro signed off       # Transaminitis   - no RUQ pain, monitor LFTs   - avoid hepatotoxic meds, BP control and trend LFTs  -improving     # Gi prophylaxis   - on PPIs    #Progress Note Handoff  Pending (specify):  bp, h/h trend, monitor tele, attempt to uptitrate metoprolol if BP tolerates

## 2025-02-16 NOTE — PROGRESS NOTE ADULT - SUBJECTIVE AND OBJECTIVE BOX
Patient is a 74y old  Male who presents with a chief complaint of weakness (15 Feb 2025 14:10)      Patient seen and examined at bedside.  Patient denies any chest pain or shortness of breaht   ALLERGIES:  No Known Allergies    MEDICATIONS:  apixaban 5 milliGRAM(s) Oral two times a day  chlorhexidine 2% Cloths 1 Application(s) Topical daily  ezetimibe 10 milliGRAM(s) Oral daily  lidocaine 5% Ointment 1 Application(s) Topical four times a day PRN  magnesium oxide 400 milliGRAM(s) Oral three times a day with meals  metoprolol tartrate 25 milliGRAM(s) Oral three times a day  midodrine 15 milliGRAM(s) Oral every 8 hours  pantoprazole    Tablet 40 milliGRAM(s) Oral daily  sacubitril 24 mG/valsartan 26 mG 1 Tablet(s) Oral two times a day    Vital Signs Last 24 Hrs  T(F): 98.2 (16 Feb 2025 05:16), Max: 98.5 (15 Feb 2025 20:02)  HR: 101 (16 Feb 2025 05:16) (77 - 101)  BP: 104/63 (16 Feb 2025 05:16) (90/59 - 113/70)  RR: 18 (16 Feb 2025 05:16) (17 - 18)  SpO2: 100% (15 Feb 2025 20:02) (100% - 100%)  I&O's Summary    15 Feb 2025 07:01  -  16 Feb 2025 07:00  --------------------------------------------------------  IN: 0 mL / OUT: 1900 mL / NET: -1900 mL        PHYSICAL EXAM:  General: NAD, A/O x 3  ENT: MMM  Neck: Supple, No JVD  Lungs: Clear to auscultation bilaterally, no crackles   Cardio: RRR, S1/S2, 2/6 murmur   Abdomen: Soft, Nontender, Nondistended; Bowel sounds present  Extremities: No cyanosis, +1 Le  edema    LABS:                        8.7    7.19  )-----------( 225      ( 16 Feb 2025 06:18 )             28.3     02-16    141  |  107  |  16  ----------------------------<  76  4.7   |  24  |  0.8    Ca    8.6      16 Feb 2025 06:18  Mg     1.6     02-16    TPro  5.4  /  Alb  3.4  /  TBili  0.7  /  DBili  x   /  AST  29  /  ALT  69  /  AlkPhos  63  02-16      PT/INR - ( 14 Feb 2025 04:51 )   PT: 16.40 sec;   INR: 1.38 ratio                                   Urinalysis Basic - ( 16 Feb 2025 06:18 )    Color: x / Appearance: x / SG: x / pH: x  Gluc: 76 mg/dL / Ketone: x  / Bili: x / Urobili: x   Blood: x / Protein: x / Nitrite: x   Leuk Esterase: x / RBC: x / WBC x   Sq Epi: x / Non Sq Epi: x / Bacteria: x            RADIOLOGY & ADDITIONAL TESTS:    Care Discussed with Consultants/Other Providers:

## 2025-02-16 NOTE — CHART NOTE - NSCHARTNOTEFT_GEN_A_CORE
Rolling Walker:  Patient requires rolling walker due to heart failure for safe ambulation at discharge.  Patient presents with impaired ambulatory ability secondary to muscle weakness of his upper and lower extremities.  As a result, Mr. Stanley is limited in his functional mobility and requires assistance.

## 2025-02-17 LAB
ALBUMIN SERPL ELPH-MCNC: 3.5 G/DL — SIGNIFICANT CHANGE UP (ref 3.5–5.2)
ALP SERPL-CCNC: 64 U/L — SIGNIFICANT CHANGE UP (ref 30–115)
ALT FLD-CCNC: 59 U/L — HIGH (ref 0–41)
ANION GAP SERPL CALC-SCNC: 6 MMOL/L — LOW (ref 7–14)
AST SERPL-CCNC: 22 U/L — SIGNIFICANT CHANGE UP (ref 0–41)
BASOPHILS # BLD AUTO: 0.04 K/UL — SIGNIFICANT CHANGE UP (ref 0–0.2)
BASOPHILS NFR BLD AUTO: 0.5 % — SIGNIFICANT CHANGE UP (ref 0–1)
BILIRUB SERPL-MCNC: 0.7 MG/DL — SIGNIFICANT CHANGE UP (ref 0.2–1.2)
BUN SERPL-MCNC: 17 MG/DL — SIGNIFICANT CHANGE UP (ref 10–20)
CALCIUM SERPL-MCNC: 8.5 MG/DL — SIGNIFICANT CHANGE UP (ref 8.4–10.5)
CHLORIDE SERPL-SCNC: 105 MMOL/L — SIGNIFICANT CHANGE UP (ref 98–110)
CO2 SERPL-SCNC: 27 MMOL/L — SIGNIFICANT CHANGE UP (ref 17–32)
CREAT SERPL-MCNC: 0.9 MG/DL — SIGNIFICANT CHANGE UP (ref 0.7–1.5)
EGFR: 90 ML/MIN/1.73M2 — SIGNIFICANT CHANGE UP
EOSINOPHIL # BLD AUTO: 0.27 K/UL — SIGNIFICANT CHANGE UP (ref 0–0.7)
EOSINOPHIL NFR BLD AUTO: 3.4 % — SIGNIFICANT CHANGE UP (ref 0–8)
GLUCOSE SERPL-MCNC: 100 MG/DL — HIGH (ref 70–99)
HCT VFR BLD CALC: 28.1 % — LOW (ref 42–52)
HGB BLD-MCNC: 8.4 G/DL — LOW (ref 14–18)
IMM GRANULOCYTES NFR BLD AUTO: 0.7 % — HIGH (ref 0.1–0.3)
LYMPHOCYTES # BLD AUTO: 1.15 K/UL — LOW (ref 1.2–3.4)
LYMPHOCYTES # BLD AUTO: 14.3 % — LOW (ref 20.5–51.1)
MCHC RBC-ENTMCNC: 29.9 G/DL — LOW (ref 32–37)
MCHC RBC-ENTMCNC: 30.2 PG — SIGNIFICANT CHANGE UP (ref 27–31)
MCV RBC AUTO: 101.1 FL — HIGH (ref 80–94)
MONOCYTES # BLD AUTO: 0.64 K/UL — HIGH (ref 0.1–0.6)
MONOCYTES NFR BLD AUTO: 8 % — SIGNIFICANT CHANGE UP (ref 1.7–9.3)
NEUTROPHILS # BLD AUTO: 5.86 K/UL — SIGNIFICANT CHANGE UP (ref 1.4–6.5)
NEUTROPHILS NFR BLD AUTO: 73.1 % — SIGNIFICANT CHANGE UP (ref 42.2–75.2)
NRBC BLD AUTO-RTO: 0 /100 WBCS — SIGNIFICANT CHANGE UP (ref 0–0)
PLATELET # BLD AUTO: 228 K/UL — SIGNIFICANT CHANGE UP (ref 130–400)
PMV BLD: 10.8 FL — HIGH (ref 7.4–10.4)
POTASSIUM SERPL-MCNC: 5.3 MMOL/L — HIGH (ref 3.5–5)
POTASSIUM SERPL-SCNC: 5.3 MMOL/L — HIGH (ref 3.5–5)
PROT SERPL-MCNC: 5.3 G/DL — LOW (ref 6–8)
RBC # BLD: 2.78 M/UL — LOW (ref 4.7–6.1)
RBC # FLD: 15.2 % — HIGH (ref 11.5–14.5)
SODIUM SERPL-SCNC: 138 MMOL/L — SIGNIFICANT CHANGE UP (ref 135–146)
WBC # BLD: 8.02 K/UL — SIGNIFICANT CHANGE UP (ref 4.8–10.8)
WBC # FLD AUTO: 8.02 K/UL — SIGNIFICANT CHANGE UP (ref 4.8–10.8)

## 2025-02-17 PROCEDURE — 99232 SBSQ HOSP IP/OBS MODERATE 35: CPT

## 2025-02-17 RX ORDER — METOPROLOL SUCCINATE 50 MG/1
25 TABLET, EXTENDED RELEASE ORAL DAILY
Refills: 0 | Status: DISCONTINUED | OUTPATIENT
Start: 2025-02-17 | End: 2025-02-17

## 2025-02-17 RX ORDER — METOPROLOL SUCCINATE 50 MG/1
25 TABLET, EXTENDED RELEASE ORAL
Refills: 0 | Status: DISCONTINUED | OUTPATIENT
Start: 2025-02-17 | End: 2025-02-18

## 2025-02-17 RX ORDER — SODIUM ZIRCONIUM CYCLOSILICATE 5 G/5G
5 POWDER, FOR SUSPENSION ORAL ONCE
Refills: 0 | Status: COMPLETED | OUTPATIENT
Start: 2025-02-17 | End: 2025-02-17

## 2025-02-17 RX ORDER — METOPROLOL SUCCINATE 50 MG/1
25 TABLET, EXTENDED RELEASE ORAL EVERY 8 HOURS
Refills: 0 | Status: DISCONTINUED | OUTPATIENT
Start: 2025-02-17 | End: 2025-02-17

## 2025-02-17 RX ADMIN — MIDODRINE HYDROCHLORIDE 15 MILLIGRAM(S): 5 TABLET ORAL at 21:40

## 2025-02-17 RX ADMIN — APIXABAN 5 MILLIGRAM(S): 2.5 TABLET, FILM COATED ORAL at 05:04

## 2025-02-17 RX ADMIN — Medication 40 MILLIGRAM(S): at 12:46

## 2025-02-17 RX ADMIN — EZETIMIBE 10 MILLIGRAM(S): 10 TABLET ORAL at 12:45

## 2025-02-17 RX ADMIN — APIXABAN 5 MILLIGRAM(S): 2.5 TABLET, FILM COATED ORAL at 17:08

## 2025-02-17 RX ADMIN — MIDODRINE HYDROCHLORIDE 15 MILLIGRAM(S): 5 TABLET ORAL at 14:18

## 2025-02-17 RX ADMIN — Medication 400 MILLIGRAM(S): at 08:31

## 2025-02-17 RX ADMIN — MIDODRINE HYDROCHLORIDE 15 MILLIGRAM(S): 5 TABLET ORAL at 05:04

## 2025-02-17 RX ADMIN — Medication 400 MILLIGRAM(S): at 17:08

## 2025-02-17 RX ADMIN — SODIUM ZIRCONIUM CYCLOSILICATE 5 GRAM(S): 5 POWDER, FOR SUSPENSION ORAL at 10:31

## 2025-02-17 RX ADMIN — METOPROLOL SUCCINATE 25 MILLIGRAM(S): 50 TABLET, EXTENDED RELEASE ORAL at 05:04

## 2025-02-17 RX ADMIN — Medication 400 MILLIGRAM(S): at 12:46

## 2025-02-17 RX ADMIN — Medication 1 APPLICATION(S): at 12:46

## 2025-02-17 RX ADMIN — METOPROLOL SUCCINATE 25 MILLIGRAM(S): 50 TABLET, EXTENDED RELEASE ORAL at 12:46

## 2025-02-17 RX ADMIN — METOPROLOL SUCCINATE 25 MILLIGRAM(S): 50 TABLET, EXTENDED RELEASE ORAL at 17:58

## 2025-02-17 NOTE — PROGRESS NOTE ADULT - ASSESSMENT
74-year-old male with past medical history of hypertension, dyslipidemia, mitral valve regurg, A-fib on Coumadin,HFrEF with an EF of 45% in december , and status post AICD presenting for weakness. Patient endorses persistent, complaining generalized weakness, fatigue, anorexia and poor p.o. intake for the past 5-6 weeks. Patient also reports a significant weight loss. Patient  states he had recent blood work which showed worsening kidney function, creatinine was 3.1 in  january up from a baseline of 1,2 in december and currently creatinine in 5.3.     # Hypovolemic Shock due to polypharmacy   # A-fib with RVR   # HFrEF 45%/ h/o AICD  - currently HDS, afebrile and sating well on RA  -  off pressors now  -  c/w midodrine 15 q8, wean as tolerated   - TSH low, Ft4 normal, t3 low, euthyroid SS, discussed with endocrine yesterday, no meds needed and outpt f/u for repeat TFTs in 4-6 weeks   - TTE: EF 45-50% with moderate TR only  - case discussed with pt cardio Dr Cunningham ,  echo unchanged since Nov 2023, cath was ok a few years ago.   - consulted by cardiology , recommendations noted  - metoprolol  25mg XL  - restart entresto 2/15  - Aldactone and Farxiga can be started outpatient.   - Replete Mg. Monitor electrolytes. Keep K > 4 and Mg > 2.2.    # Orthostatic Hypotension - improved  - check orthostatic VS Q shift   - ACE wrap on LE, abdominal binder   - fall precautions     #Anemia  -gave 1uprbc 2/15  -anemia of chronic disease  -pt with hx of sigmoidectomy, pt is refusing a repeat CT abd/pelvis or to see GI at this time    # MARIANNE /HAGMA/ h/o nephrolithiasis   - resolved  - d/c Calcium Acetate   - monitor BUN/cr and urine output   - keep MAP above 65  -encourage po hydration   - CT noted: No acute abnormality in the abdomen and pelvis. No hydronephrosis, Left-sided nephrolithiasis.  -nephro signed off       # Transaminitis   - no RUQ pain, monitor LFTs   - avoid hepatotoxic meds, BP control and trend LFTs  -improving     # Gi prophylaxis   - on PPIs    #Progress Note Handoff  Pending (specify):  monitor BMP and BP today. prepare for dc tomorrow 74-year-old male with past medical history of hypertension, dyslipidemia, mitral valve regurg, A-fib on Coumadin,HFrEF with an EF of 45% in december , and status post AICD presenting for weakness. Patient endorses persistent, complaining generalized weakness, fatigue, anorexia and poor p.o. intake for the past 5-6 weeks. Patient also reports a significant weight loss. Patient  states he had recent blood work which showed worsening kidney function, creatinine was 3.1 in  january up from a baseline of 1,2 in december and currently creatinine in 5.3.     # Hypovolemic Shock due to polypharmacy   # A-fib with RVR   # HFrEF 45%/ h/o AICD  - currently HDS, afebrile and sating well on RA  -  off pressors now  -  c/w midodrine 15 q8, wean as tolerated   - TSH low, Ft4 normal, t3 low, euthyroid SS, discussed with endocrine yesterday, no meds needed and outpt f/u for repeat TFTs in 4-6 weeks   - TTE: EF 45-50% with moderate TR only  - case discussed with pt cardio Dr Cunningham ,  echo unchanged since Nov 2023, cath was ok a few years ago.   - consulted by cardiology , recommendations noted  - metoprolol  25mg --> metop tartrate 25mg qid; may increase to 50mg if blood pressure tolerates; HR goal ~70bpm  - holding entresto 2/15  - Aldactone and Farxiga can be started outpatient.   - Replete Mg. Monitor electrolytes. Keep K > 4 and Mg > 2.2.    # Orthostatic Hypotension - improved  - check orthostatic VS Q shift   - ACE wrap on LE, abdominal binder   - fall precautions     #Anemia  -gave 1uprbc 2/15  -anemia of chronic disease  -pt with hx of sigmoidectomy, pt is refusing a repeat CT abd/pelvis or to see GI at this time    # MARIANNE /HAGMA/ h/o nephrolithiasis   - resolved  - d/c Calcium Acetate   - monitor BUN/cr and urine output   - keep MAP above 65  -encourage po hydration   - CT noted: No acute abnormality in the abdomen and pelvis. No hydronephrosis, Left-sided nephrolithiasis.  -nephro signed off       # Transaminitis   - no RUQ pain, monitor LFTs   - avoid hepatotoxic meds, BP control and trend LFTs  -improving     # Gi prophylaxis   - on PPIs    #Progress Note Handoff  Pending (specify):  monitor BMP and BP today. prepare for dc tomorrow

## 2025-02-17 NOTE — PROGRESS NOTE ADULT - SUBJECTIVE AND OBJECTIVE BOX
ALLIE TORRE 74y Male  MRN#: [id number]   Hospital Day: 13d    HPI:    Patient is a 74-year-old male with past medical history of hypertension, dyslipidemia, mitral valve regurg, A-fib on Coumadin,HFrEF with an EF of 45% in december , and status post AICD presenting for weakness. Patient endorses persistent, complaining generalized weakness, fatigue, anorexia and poor p.o. intake for the past 5-6 weeks. Patient also reports a significant weight loss. Patient  states he had recent blood work which showed worsening kidney function, creatinine was 3.1 in  january up from a baseline of 1,2 in december and currently creatinine in 5.3.         · BP   65 mm Hg/45 mm Hg  · Heart Rate  86 /min  · Temp (C)  36.9 Degrees C oral   · SpO2 (%)  98 % on room air      (04 Feb 2025 13:48)      SUBJECTIVE  Patient is a 74y old Male who presents with a chief complaint of weakness (16 Feb 2025 11:59)  Currently admitted to medicine with the primary diagnosis of Acute kidney failure      INTERVAL HPI AND OVERNIGHT EVENTS:  Patient was examined and seen at bedside. Comfortable this AM    OBJECTIVE  PAST MEDICAL & SURGICAL HISTORY    ALLERGIES:  No Known Allergies    MEDICATIONS:  STANDING MEDICATIONS  apixaban 5 milliGRAM(s) Oral two times a day  chlorhexidine 2% Cloths 1 Application(s) Topical daily  ezetimibe 10 milliGRAM(s) Oral daily  magnesium oxide 400 milliGRAM(s) Oral three times a day with meals  metoprolol succinate ER 25 milliGRAM(s) Oral daily  midodrine 15 milliGRAM(s) Oral every 8 hours  pantoprazole    Tablet 40 milliGRAM(s) Oral daily    PRN MEDICATIONS  lidocaine 5% Ointment 1 Application(s) Topical four times a day PRN      VITAL SIGNS: Last 24 Hours  T(C): 36.7 (17 Feb 2025 04:50), Max: 36.7 (17 Feb 2025 04:50)  T(F): 98.1 (17 Feb 2025 04:50), Max: 98.1 (17 Feb 2025 04:50)  HR: 85 (17 Feb 2025 04:50) (85 - 116)  BP: 108/65 (17 Feb 2025 04:50) (73/44 - 129/79)  BP(mean): 79 (17 Feb 2025 04:50) (79 - 79)  RR: 18 (17 Feb 2025 04:50) (18 - 18)  SpO2: 95% (17 Feb 2025 07:53) (95% - 96%)    LABS:                        8.4    8.02  )-----------( 228      ( 17 Feb 2025 05:35 )             28.1     02-17    138  |  105  |  17  ----------------------------<  100[H]  5.3[H]   |  27  |  0.9    Ca    8.5      17 Feb 2025 05:35  Mg     1.6     02-16    TPro  5.3[L]  /  Alb  3.5  /  TBili  0.7  /  DBili  x   /  AST  22  /  ALT  59[H]  /  AlkPhos  64  02-17      Urinalysis Basic - ( 17 Feb 2025 05:35 )    Color: x / Appearance: x / SG: x / pH: x  Gluc: 100 mg/dL / Ketone: x  / Bili: x / Urobili: x   Blood: x / Protein: x / Nitrite: x   Leuk Esterase: x / RBC: x / WBC x   Sq Epi: x / Non Sq Epi: x / Bacteria: x        PHYSICAL EXAM:  CONSTITUTIONAL: No acute distress  HEAD: Atraumatic, normocephalic  EYES: EOM intact, PERRLA, conjunctiva and sclera clear  ENT: Supple, no masses, no thyromegaly, no bruits, no JVD; moist mucous membranes  PULMONARY: Clear to auscultation bilaterally; no wheezes, rales, or rhonchi  CARDIOVASCULAR: Regular rate and rhythm; no murmurs, rubs, or gallops  GASTROINTESTINAL: Soft, non-tender, non-distended; bowel sounds present  MUSCULOSKELETAL: 2+ peripheral pulses; no clubbing, no cyanosis  NEUROLOGY: non-focal    ASSESSMENT & PLAN      74-year-old male with past medical history of hypertension, dyslipidemia, mitral valve regurg, A-fib on Coumadin,HFrEF with an EF of 45% in december , and status post AICD presenting for weakness. Patient endorses persistent, complaining generalized weakness, fatigue, anorexia and poor p.o. intake for the past 5-6 weeks. Patient also reports a significant weight loss. Patient  states he had recent blood work which showed worsening kidney function, creatinine was 3.1 in  january up from a baseline of 1,2 in december and currently creatinine in 5.3.     2/17  - monitor BP, electrolytes  - anticipate tomorrow    # Hypovolemic Shock due to polypharmacy   # A-fib with RVR   # HFrEF 45%/ h/o AICD  - currently HDS, afebrile and sating well on RA  -  off pressors now  -  c/w midodrine 15 q8, wean as tolerated   - TSH low, Ft4 normal, t3 low, euthyroid SS, discussed with endocrine yesterday, no meds needed and outpt f/u for repeat TFTs in 4-6 weeks   - TTE: EF 45-50% with moderate TR only  - case discussed with pt cardio Dr Cunningham ,  echo unchanged since Nov 2023, cath was ok a few years ago.   - consulted by cardiology , recommendations noted  - metoprolol  25mg XL  - restart entresto 2/15  - Aldactone and Farxiga can be started outpatient.   - Replete Mg. Monitor electrolytes. Keep K > 4 and Mg > 2.2.    # Orthostatic Hypotension - improved  - check orthostatic VS Q shift   - ACE wrap on LE, abdominal binder   - fall precautions     #Anemia  -gave 1uprbc 2/15  -anemia of chronic disease  -pt with hx of sigmoidectomy, pt is refusing a repeat CT abd/pelvis or to see GI at this time    # MARIANNE /HAGMA/ h/o nephrolithiasis   - resolved  - d/c Calcium Acetate   - monitor BUN/cr and urine output   - keep MAP above 65  -encourage po hydration   - CT noted: No acute abnormality in the abdomen and pelvis. No hydronephrosis, Left-sided nephrolithiasis.  -nephro signed off     # Transaminitis   - no RUQ pain, monitor LFTs   - avoid hepatotoxic meds, BP control and trend LFTs  -improving     # Gi prophylaxis   - on PPIs

## 2025-02-17 NOTE — PROGRESS NOTE ADULT - SUBJECTIVE AND OBJECTIVE BOX
ALLIE TORRE  74y  Male      Patient is a 74y old  Male who presents with a chief complaint of weakness (16 Feb 2025 11:59)      INTERVAL HPI/OVERNIGHT EVENTS:       REVIEW OF SYSTEMS:  CONSTITUTIONAL: No fever, weight loss, or fatigue  RESPIRATORY: No cough, wheezing, chills or hemoptysis; No shortness of breath  CARDIOVASCULAR: No chest pain, palpitations, dizziness, or leg swelling  GASTROINTESTINAL: No abdominal or epigastric pain. No nausea, vomiting, or hematemesis; No diarrhea or constipation. No melena or hematochezia.  GENITOURINARY: No dysuria, frequency, hematuria, or incontinence  NEUROLOGICAL: No headaches, memory loss, loss of strength, numbness, or tremors  SKIN: No itching, burning, rashes, or lesions   MUSCULOSKELETAL: No joint pain or swelling; No muscle, back, or extremity pain  PSYCHIATRIC: No depression, anxiety, mood swings, or difficulty sleeping  All other review of systems negative    T(C): 36.7 (02-17-25 @ 04:50), Max: 36.7 (02-17-25 @ 04:50)  HR: 85 (02-17-25 @ 04:50) (85 - 116)  BP: 108/65 (02-17-25 @ 04:50) (73/44 - 129/79)  RR: 18 (02-17-25 @ 04:50) (18 - 18)  SpO2: 95% (02-17-25 @ 07:53) (95% - 96%)  Wt(kg): --Vital Signs Last 24 Hrs  T(C): 36.7 (17 Feb 2025 04:50), Max: 36.7 (17 Feb 2025 04:50)  T(F): 98.1 (17 Feb 2025 04:50), Max: 98.1 (17 Feb 2025 04:50)  HR: 85 (17 Feb 2025 04:50) (85 - 116)  BP: 108/65 (17 Feb 2025 04:50) (73/44 - 129/79)  BP(mean): 79 (17 Feb 2025 04:50) (79 - 79)  RR: 18 (17 Feb 2025 04:50) (18 - 18)  SpO2: 95% (17 Feb 2025 07:53) (95% - 96%)    Parameters below as of 17 Feb 2025 07:53  Patient On (Oxygen Delivery Method): room air          02-16-25 @ 07:01  -  02-17-25 @ 07:00  --------------------------------------------------------  IN: 626 mL / OUT: 1925 mL / NET: -1299 mL        PHYSICAL EXAM:  GENERAL: NAD, well-groomed, well-developed  PSYCH: no agitation, baseline mentation  NERVOUS SYSTEM:  Alert & Oriented X3, Motor Strength 5/5 B/L upper and lower extremities; Sensory intact; FTN WNL  PULMONARY: Clear to percussion bilaterally; No rales, rhonchi, wheezing, or rubs  CARDIOVASCULAR: Regular rate and rhythm; No murmurs, rubs, or gallops  GI: Soft, Nontender, Nondistended; Bowel sounds present  EXTREMITIES:  2+ Peripheral Pulses, No clubbing, cyanosis, or edema  LYMPH: No lymphadenopathy noted  SKIN: No rashes or lesions    Consultant(s) Notes Reviewed:  [x ] YES  [ ] NO    Discussed with Consultants/Other Providers [ x] YES     LABS                          8.4    8.02  )-----------( 228      ( 17 Feb 2025 05:35 )             28.1     02-17    138  |  105  |  17  ----------------------------<  100[H]  5.3[H]   |  27  |  0.9    Ca    8.5      17 Feb 2025 05:35  Mg     1.6     02-16    TPro  5.3[L]  /  Alb  3.5  /  TBili  0.7  /  DBili  x   /  AST  22  /  ALT  59[H]  /  AlkPhos  64  02-17      Urinalysis Basic - ( 17 Feb 2025 05:35 )    Color: x / Appearance: x / SG: x / pH: x  Gluc: 100 mg/dL / Ketone: x  / Bili: x / Urobili: x   Blood: x / Protein: x / Nitrite: x   Leuk Esterase: x / RBC: x / WBC x   Sq Epi: x / Non Sq Epi: x / Bacteria: x      RADIOLOGY & ADDITIONAL TESTS:  - no images 2/17  Imaging Personally Reviewed:  [ ] YES  [ ] NO    HEALTH ISSUES - PROBLEM Dx:      MEDICATIONS  (STANDING):  apixaban 5 milliGRAM(s) Oral two times a day  chlorhexidine 2% Cloths 1 Application(s) Topical daily  ezetimibe 10 milliGRAM(s) Oral daily  magnesium oxide 400 milliGRAM(s) Oral three times a day with meals  metoprolol succinate ER 25 milliGRAM(s) Oral daily  midodrine 15 milliGRAM(s) Oral every 8 hours  pantoprazole    Tablet 40 milliGRAM(s) Oral daily    MEDICATIONS  (PRN):  lidocaine 5% Ointment 1 Application(s) Topical four times a day PRN pain at kilgore area     PADMAALLIE AGUILAR  74y  Male      Patient is a 74y old  Male who presents with a chief complaint of weakness (16 Feb 2025 11:59)      INTERVAL HPI/OVERNIGHT EVENTS:  no acute events overnight. no major complaints. no dizziness or lightheadedness when transfer from bed to chair        T(C): 36.7 (02-17-25 @ 04:50), Max: 36.7 (02-17-25 @ 04:50)  HR: 85 (02-17-25 @ 04:50) (85 - 116)  BP: 108/65 (02-17-25 @ 04:50) (73/44 - 129/79)  RR: 18 (02-17-25 @ 04:50) (18 - 18)  SpO2: 95% (02-17-25 @ 07:53) (95% - 96%)  Wt(kg): --Vital Signs Last 24 Hrs  T(C): 36.7 (17 Feb 2025 04:50), Max: 36.7 (17 Feb 2025 04:50)  T(F): 98.1 (17 Feb 2025 04:50), Max: 98.1 (17 Feb 2025 04:50)  HR: 85 (17 Feb 2025 04:50) (85 - 116)  BP: 108/65 (17 Feb 2025 04:50) (73/44 - 129/79)  BP(mean): 79 (17 Feb 2025 04:50) (79 - 79)  RR: 18 (17 Feb 2025 04:50) (18 - 18)  SpO2: 95% (17 Feb 2025 07:53) (95% - 96%)    Parameters below as of 17 Feb 2025 07:53  Patient On (Oxygen Delivery Method): room air          02-16-25 @ 07:01  -  02-17-25 @ 07:00  --------------------------------------------------------  IN: 626 mL / OUT: 1925 mL / NET: -1299 mL        PHYSICAL EXAM:  GENERAL: NAD, well-groomed, well-developed  PSYCH: no agitation, baseline mentation  NERVOUS SYSTEM:  Alert & Oriented X3   PULMONARY: Clear to percussion bilaterally; No rales, rhonchi, wheezing, or rubs  CARDIOVASCULAR: Regular rate and rhythm; No murmurs, rubs, or gallops  GI: , Nondistended; Bowel sounds present  EXTREMITIES:   No clubbing, cyanosis, or edema  SKIN: No rashes or lesions    Consultant(s) Notes Reviewed:  [x ] YES  [ ] NO    Discussed with Consultants/Other Providers [ x] YES     LABS                          8.4    8.02  )-----------( 228      ( 17 Feb 2025 05:35 )             28.1     02-17    138  |  105  |  17  ----------------------------<  100[H]  5.3[H]   |  27  |  0.9    Ca    8.5      17 Feb 2025 05:35  Mg     1.6     02-16    TPro  5.3[L]  /  Alb  3.5  /  TBili  0.7  /  DBili  x   /  AST  22  /  ALT  59[H]  /  AlkPhos  64  02-17      Urinalysis Basic - ( 17 Feb 2025 05:35 )    Color: x / Appearance: x / SG: x / pH: x  Gluc: 100 mg/dL / Ketone: x  / Bili: x / Urobili: x   Blood: x / Protein: x / Nitrite: x   Leuk Esterase: x / RBC: x / WBC x   Sq Epi: x / Non Sq Epi: x / Bacteria: x      RADIOLOGY & ADDITIONAL TESTS:  - no images 2/17  Imaging Personally Reviewed:  [ ] YES  [ ] NO    HEALTH ISSUES - PROBLEM Dx:      MEDICATIONS  (STANDING):  apixaban 5 milliGRAM(s) Oral two times a day  chlorhexidine 2% Cloths 1 Application(s) Topical daily  ezetimibe 10 milliGRAM(s) Oral daily  magnesium oxide 400 milliGRAM(s) Oral three times a day with meals  metoprolol succinate ER 25 milliGRAM(s) Oral daily  midodrine 15 milliGRAM(s) Oral every 8 hours  pantoprazole    Tablet 40 milliGRAM(s) Oral daily    MEDICATIONS  (PRN):  lidocaine 5% Ointment 1 Application(s) Topical four times a day PRN pain at kilgore area

## 2025-02-18 ENCOUNTER — TRANSCRIPTION ENCOUNTER (OUTPATIENT)
Age: 75
End: 2025-02-18

## 2025-02-18 LAB
ALBUMIN SERPL ELPH-MCNC: 3.4 G/DL — LOW (ref 3.5–5.2)
ALP SERPL-CCNC: 63 U/L — SIGNIFICANT CHANGE UP (ref 30–115)
ALT FLD-CCNC: 46 U/L — HIGH (ref 0–41)
ANION GAP SERPL CALC-SCNC: 11 MMOL/L — SIGNIFICANT CHANGE UP (ref 7–14)
AST SERPL-CCNC: 23 U/L — SIGNIFICANT CHANGE UP (ref 0–41)
BILIRUB SERPL-MCNC: 0.8 MG/DL — SIGNIFICANT CHANGE UP (ref 0.2–1.2)
BUN SERPL-MCNC: 18 MG/DL — SIGNIFICANT CHANGE UP (ref 10–20)
CALCIUM SERPL-MCNC: 8.3 MG/DL — LOW (ref 8.4–10.5)
CHLORIDE SERPL-SCNC: 107 MMOL/L — SIGNIFICANT CHANGE UP (ref 98–110)
CO2 SERPL-SCNC: 22 MMOL/L — SIGNIFICANT CHANGE UP (ref 17–32)
CREAT SERPL-MCNC: 0.8 MG/DL — SIGNIFICANT CHANGE UP (ref 0.7–1.5)
EGFR: 93 ML/MIN/1.73M2 — SIGNIFICANT CHANGE UP
GLUCOSE SERPL-MCNC: 78 MG/DL — SIGNIFICANT CHANGE UP (ref 70–99)
HCT VFR BLD CALC: 28.9 % — LOW (ref 42–52)
HGB BLD-MCNC: 8.6 G/DL — LOW (ref 14–18)
MCHC RBC-ENTMCNC: 29.8 G/DL — LOW (ref 32–37)
MCHC RBC-ENTMCNC: 30.7 PG — SIGNIFICANT CHANGE UP (ref 27–31)
MCV RBC AUTO: 103.2 FL — HIGH (ref 80–94)
NRBC BLD AUTO-RTO: 0 /100 WBCS — SIGNIFICANT CHANGE UP (ref 0–0)
PLATELET # BLD AUTO: 146 K/UL — SIGNIFICANT CHANGE UP (ref 130–400)
PMV BLD: 11.9 FL — HIGH (ref 7.4–10.4)
POTASSIUM SERPL-MCNC: 4.7 MMOL/L — SIGNIFICANT CHANGE UP (ref 3.5–5)
POTASSIUM SERPL-SCNC: 4.7 MMOL/L — SIGNIFICANT CHANGE UP (ref 3.5–5)
PROT SERPL-MCNC: 5.3 G/DL — LOW (ref 6–8)
RBC # BLD: 2.8 M/UL — LOW (ref 4.7–6.1)
RBC # FLD: 15.3 % — HIGH (ref 11.5–14.5)
SODIUM SERPL-SCNC: 140 MMOL/L — SIGNIFICANT CHANGE UP (ref 135–146)
WBC # BLD: 6.11 K/UL — SIGNIFICANT CHANGE UP (ref 4.8–10.8)
WBC # FLD AUTO: 6.11 K/UL — SIGNIFICANT CHANGE UP (ref 4.8–10.8)

## 2025-02-18 PROCEDURE — 99232 SBSQ HOSP IP/OBS MODERATE 35: CPT

## 2025-02-18 RX ORDER — METOPROLOL SUCCINATE 50 MG/1
50 TABLET, EXTENDED RELEASE ORAL EVERY 6 HOURS
Refills: 0 | Status: DISCONTINUED | OUTPATIENT
Start: 2025-02-18 | End: 2025-02-19

## 2025-02-18 RX ADMIN — METOPROLOL SUCCINATE 25 MILLIGRAM(S): 50 TABLET, EXTENDED RELEASE ORAL at 00:15

## 2025-02-18 RX ADMIN — METOPROLOL SUCCINATE 50 MILLIGRAM(S): 50 TABLET, EXTENDED RELEASE ORAL at 23:59

## 2025-02-18 RX ADMIN — METOPROLOL SUCCINATE 25 MILLIGRAM(S): 50 TABLET, EXTENDED RELEASE ORAL at 11:35

## 2025-02-18 RX ADMIN — Medication 1 APPLICATION(S): at 11:36

## 2025-02-18 RX ADMIN — Medication 400 MILLIGRAM(S): at 17:27

## 2025-02-18 RX ADMIN — EZETIMIBE 10 MILLIGRAM(S): 10 TABLET ORAL at 11:35

## 2025-02-18 RX ADMIN — MIDODRINE HYDROCHLORIDE 15 MILLIGRAM(S): 5 TABLET ORAL at 15:18

## 2025-02-18 RX ADMIN — APIXABAN 5 MILLIGRAM(S): 2.5 TABLET, FILM COATED ORAL at 17:27

## 2025-02-18 RX ADMIN — MIDODRINE HYDROCHLORIDE 15 MILLIGRAM(S): 5 TABLET ORAL at 21:41

## 2025-02-18 RX ADMIN — Medication 40 MILLIGRAM(S): at 11:35

## 2025-02-18 RX ADMIN — APIXABAN 5 MILLIGRAM(S): 2.5 TABLET, FILM COATED ORAL at 05:52

## 2025-02-18 RX ADMIN — METOPROLOL SUCCINATE 25 MILLIGRAM(S): 50 TABLET, EXTENDED RELEASE ORAL at 05:52

## 2025-02-18 RX ADMIN — METOPROLOL SUCCINATE 50 MILLIGRAM(S): 50 TABLET, EXTENDED RELEASE ORAL at 17:27

## 2025-02-18 RX ADMIN — MIDODRINE HYDROCHLORIDE 15 MILLIGRAM(S): 5 TABLET ORAL at 05:51

## 2025-02-18 RX ADMIN — Medication 400 MILLIGRAM(S): at 11:35

## 2025-02-18 RX ADMIN — Medication 400 MILLIGRAM(S): at 08:08

## 2025-02-18 NOTE — DISCHARGE NOTE PROVIDER - NPI NUMBER (FOR SYSADMIN USE ONLY) :
Outpatient Pediatric Speech Therapy Daily Note    Date: 9/19/2019  Time In: 11:00AM  Time Out: 11:45AM     Patient Name: Daniel Colindres  MRN: [id number]  Therapy Diagnosis:   Encounter Diagnosis   Name Primary?    Receptive-expressive language delay Yes      Physician: Marycruz Cole MD   Medical Diagnosis:  F80.1 (ICD-10-CM) - Expressive language disorder    Age: 4  y.o. 7  m.o.    Visit # 15  Date of Evaluation: 02/22/2019   Plan of Care Expiration Date: 11/22/2019  New Certification Period: 05/23/2019-11/22/2019    Subjective:   Daniel was cooperative given max redirection cues.     Pain: Daniel was unable to rate pain on a numeric scale, but no pain behaviors were noted in today's session.  Objective:   UNTIMED  Procedure Min.   HC SPEECH/LANG TX/INDIVIDUAL  45           Total Minutes: 45  Total Untimed Units: 1  Charges Billed/# of units: 1    Short-Term Objectives (3 months):  Daniel will:  1. Identify familiar objects from a group of objects without gestures during 6/10 trials across 3/3 sessions.   2. Identify photographs of familiar objects in a field of 2 during 6/10 trials across 3/3 sessions.   3. Label a variety of objects, animals, and body parts with verbalization through imitation x 10 across 3/3 sessions.  4. Request activities, terminate events, request assistance, and comment during play with verbalization through imitation x 10 across 3/3 sessions.     Patient Education/Response:   Therapist discussed patient's goals and therapy results with his parents. Parents understood.     Written Home Exercises Provided: none     Assessment:   1. Daniel identified real objects from a group of real objects without gestures- 71% accuracy        Daniel identified body-parts- 57% accuracy       Daniel followed simple commands- 44% accuracy; 80% given gesture cues     2. Daniel identified photographs of familiar objects/animals- 73% accuracy    3. Labeled pictures of familiar objects, animals, and body parts with verbalization  through imitation- mastered         Labeled spontaneously- 30% accuracy       Intelligibility during labeling- 50% given max MS cues         4. Terminated events through imitation- x 1      Requested activity through imitation- x 4 paired with visual signs       Requested assistance spontaneously- x 1     Imitated 2-3 word combinations x 6 through imitation paired with visual signs      Pt prognosis is Fair. Pt will continue to benefit from skilled outpatient speech and language therapy to address the deficits listed in the problem list on initial evaluation, provide pt/family education and to maximize pt's level of independence in the home and community environment.     Plan:   Continue speech therapy 1-2 times per/wk for 45 minutes as planned. Continue implementation of a home program to facilitate carryover of targeted language skills.    Mihaela Benitez M.S. CCC-SLP   [id number]],[[phone]]

## 2025-02-18 NOTE — PROGRESS NOTE ADULT - SUBJECTIVE AND OBJECTIVE BOX
ALLIE TORRE  74y  Male      Patient is a 74y old  Male who presents with a chief complaint of weakness (16 Feb 2025 11:59)      INTERVAL HPI/OVERNIGHT EVENTS:      REVIEW OF SYSTEMS:  CONSTITUTIONAL: No fever, weight loss, or fatigue  RESPIRATORY: No cough, wheezing, chills or hemoptysis; No shortness of breath  CARDIOVASCULAR: No chest pain, palpitations, dizziness, or leg swelling  GASTROINTESTINAL: No abdominal or epigastric pain. No nausea, vomiting, or hematemesis; No diarrhea or constipation. No melena or hematochezia.  GENITOURINARY: No dysuria, frequency, hematuria, or incontinence  NEUROLOGICAL: No headaches, memory loss, loss of strength, numbness, or tremors  SKIN: No itching, burning, rashes, or lesions   MUSCULOSKELETAL: No joint pain or swelling; No muscle, back, or extremity pain  PSYCHIATRIC: No depression, anxiety, mood swings, or difficulty sleeping  All other review of systems negative    T(C): 36.6 (02-18-25 @ 13:21), Max: 37 (02-17-25 @ 20:10)  HR: 103 (02-18-25 @ 13:21) (68 - 103)  BP: 98/60 (02-18-25 @ 13:21) (98/60 - 112/74)  RR: 19 (02-18-25 @ 13:21) (18 - 19)  SpO2: 96% (02-18-25 @ 07:48) (96% - 97%)  Wt(kg): --Vital Signs Last 24 Hrs  T(C): 36.6 (18 Feb 2025 13:21), Max: 37 (17 Feb 2025 20:10)  T(F): 97.9 (18 Feb 2025 13:21), Max: 98.6 (17 Feb 2025 20:10)  HR: 103 (18 Feb 2025 13:21) (68 - 103)  BP: 98/60 (18 Feb 2025 13:21) (98/60 - 112/74)  BP(mean): --  RR: 19 (18 Feb 2025 13:21) (18 - 19)  SpO2: 96% (18 Feb 2025 07:48) (96% - 97%)    Parameters below as of 18 Feb 2025 07:48  Patient On (Oxygen Delivery Method): room air          02-17-25 @ 07:01  -  02-18-25 @ 07:00  --------------------------------------------------------  IN: 486 mL / OUT: 1650 mL / NET: -1164 mL    02-18-25 @ 07:01  -  02-18-25 @ 16:44  --------------------------------------------------------  IN: 526 mL / OUT: 1425 mL / NET: -899 mL        PHYSICAL EXAM:  GENERAL: NAD, well-groomed, well-developed  PSYCH: no agitation, baseline mentation  NERVOUS SYSTEM:  Alert & Oriented X3, Motor Strength 5/5 B/L upper and lower extremities; Sensory intact; FTN WNL  PULMONARY: Clear to percussion bilaterally; No rales, rhonchi, wheezing, or rubs  CARDIOVASCULAR: Regular rate and rhythm; No murmurs, rubs, or gallops  GI: Soft, Nontender, Nondistended; Bowel sounds present  EXTREMITIES:  2+ Peripheral Pulses, No clubbing, cyanosis, or edema  LYMPH: No lymphadenopathy noted  SKIN: No rashes or lesions    Consultant(s) Notes Reviewed:  [x ] YES  [ ] NO    Discussed with Consultants/Other Providers [ x] YES     LABS                          8.6    6.11  )-----------( 146      ( 18 Feb 2025 05:17 )             28.9     02-18    140  |  107  |  18  ----------------------------<  78  4.7   |  22  |  0.8    Ca    8.3[L]      18 Feb 2025 05:17    TPro  5.3[L]  /  Alb  3.4[L]  /  TBili  0.8  /  DBili  x   /  AST  23  /  ALT  46[H]  /  AlkPhos  63  02-18      Urinalysis Basic - ( 18 Feb 2025 05:17 )    Color: x / Appearance: x / SG: x / pH: x  Gluc: 78 mg/dL / Ketone: x  / Bili: x / Urobili: x   Blood: x / Protein: x / Nitrite: x   Leuk Esterase: x / RBC: x / WBC x   Sq Epi: x / Non Sq Epi: x / Bacteria: x      RADIOLOGY & ADDITIONAL TESTS:  - no images 2/18  Imaging Personally Reviewed:  [ ] YES  [ ] NO    HEALTH ISSUES - PROBLEM Dx:      MEDICATIONS  (STANDING):  apixaban 5 milliGRAM(s) Oral two times a day  chlorhexidine 2% Cloths 1 Application(s) Topical daily  ezetimibe 10 milliGRAM(s) Oral daily  magnesium oxide 400 milliGRAM(s) Oral three times a day with meals  metoprolol tartrate 50 milliGRAM(s) Oral every 6 hours  midodrine 15 milliGRAM(s) Oral every 8 hours  pantoprazole    Tablet 40 milliGRAM(s) Oral daily    MEDICATIONS  (PRN):  lidocaine 5% Ointment 1 Application(s) Topical four times a day PRN pain at kilgore area     NICHOALLIE  74y  Male      Patient is a 74y old  Male who presents with a chief complaint of weakness (16 Feb 2025 11:59)      INTERVAL HPI/OVERNIGHT EVENTS: no acute events overnight.          T(C): 36.6 (02-18-25 @ 13:21), Max: 37 (02-17-25 @ 20:10)  HR: 103 (02-18-25 @ 13:21) (68 - 103)  BP: 98/60 (02-18-25 @ 13:21) (98/60 - 112/74)  RR: 19 (02-18-25 @ 13:21) (18 - 19)  SpO2: 96% (02-18-25 @ 07:48) (96% - 97%)  Wt(kg): --Vital Signs Last 24 Hrs  T(C): 36.6 (18 Feb 2025 13:21), Max: 37 (17 Feb 2025 20:10)  T(F): 97.9 (18 Feb 2025 13:21), Max: 98.6 (17 Feb 2025 20:10)  HR: 103 (18 Feb 2025 13:21) (68 - 103)  BP: 98/60 (18 Feb 2025 13:21) (98/60 - 112/74)  BP(mean): --  RR: 19 (18 Feb 2025 13:21) (18 - 19)  SpO2: 96% (18 Feb 2025 07:48) (96% - 97%)    Parameters below as of 18 Feb 2025 07:48  Patient On (Oxygen Delivery Method): room air          02-17-25 @ 07:01  -  02-18-25 @ 07:00  --------------------------------------------------------  IN: 486 mL / OUT: 1650 mL / NET: -1164 mL    02-18-25 @ 07:01  -  02-18-25 @ 16:44  --------------------------------------------------------  IN: 526 mL / OUT: 1425 mL / NET: -899 mL        PHYSICAL EXAM:  GENERAL: NAD, well-groomed, well-developed  PSYCH: no agitation, baseline mentation  NERVOUS SYSTEM:  Alert & Oriented X3   PULMONARY: Clear to percussion bilaterally; No rales, rhonchi, wheezing, or rubs  CARDIOVASCULAR: Regular rate and rhythm; No murmurs, rubs, or gallops  GI: , Nondistended; Bowel sounds present  EXTREMITIES:   No clubbing, cyanosis, or edema  SKIN: No rashes or lesions    Consultant(s) Notes Reviewed:  [x ] YES  [ ] NO    Discussed with Consultants/Other Providers [ x] YES     LABS                          8.6    6.11  )-----------( 146      ( 18 Feb 2025 05:17 )             28.9     02-18    140  |  107  |  18  ----------------------------<  78  4.7   |  22  |  0.8    Ca    8.3[L]      18 Feb 2025 05:17    TPro  5.3[L]  /  Alb  3.4[L]  /  TBili  0.8  /  DBili  x   /  AST  23  /  ALT  46[H]  /  AlkPhos  63  02-18      Urinalysis Basic - ( 18 Feb 2025 05:17 )    Color: x / Appearance: x / SG: x / pH: x  Gluc: 78 mg/dL / Ketone: x  / Bili: x / Urobili: x   Blood: x / Protein: x / Nitrite: x   Leuk Esterase: x / RBC: x / WBC x   Sq Epi: x / Non Sq Epi: x / Bacteria: x      RADIOLOGY & ADDITIONAL TESTS:  - no images 2/18  Imaging Personally Reviewed:  [ ] YES  [ ] NO    HEALTH ISSUES - PROBLEM Dx:      MEDICATIONS  (STANDING):  apixaban 5 milliGRAM(s) Oral two times a day  chlorhexidine 2% Cloths 1 Application(s) Topical daily  ezetimibe 10 milliGRAM(s) Oral daily  magnesium oxide 400 milliGRAM(s) Oral three times a day with meals  metoprolol tartrate 50 milliGRAM(s) Oral every 6 hours  midodrine 15 milliGRAM(s) Oral every 8 hours  pantoprazole    Tablet 40 milliGRAM(s) Oral daily    MEDICATIONS  (PRN):  lidocaine 5% Ointment 1 Application(s) Topical four times a day PRN pain at kilgore area

## 2025-02-18 NOTE — DISCHARGE NOTE PROVIDER - CARE PROVIDERS DIRECT ADDRESSES
[email or web address]

## 2025-02-18 NOTE — DISCHARGE NOTE PROVIDER - NSDCFUADDAPPT_GEN_ALL_CORE_FT
APPTS ARE READY TO BE MADE: [ x ] YES    Best Family or Patient Contact (if needed):    Additional Information about above appointments (if needed):    1: Cardiology (Dr. Cunningham)  2: PCP MAP      Other comments or requests:

## 2025-02-18 NOTE — PROGRESS NOTE ADULT - ASSESSMENT
74-year-old male with past medical history of hypertension, dyslipidemia, mitral valve regurg, A-fib on Coumadin,HFrEF with an EF of 45% in december , and status post AICD presenting for weakness. Patient endorses persistent, complaining generalized weakness, fatigue, anorexia and poor p.o. intake for the past 5-6 weeks. Patient also reports a significant weight loss. Patient  states he had recent blood work which showed worsening kidney function, creatinine was 3.1 in  january up from a baseline of 1,2 in december and currently creatinine in 5.3.     # Hypovolemic Shock due to polypharmacy   # A-fib with RVR   # HFrEF 45%/ h/o AICD  - currently HDS, afebrile and sating well on RA  -  off pressors now  -  c/w midodrine 15 q8, wean as tolerated   - TSH low, Ft4 normal, t3 low, euthyroid SS, discussed with endocrine yesterday, no meds needed and outpt f/u for repeat TFTs in 4-6 weeks   - TTE: EF 45-50% with moderate TR only  - case discussed with pt cardio Dr Cunningham ,  echo unchanged since Nov 2023, cath was ok a few years ago.   - consulted by cardiology , recommendations noted  - metoprolol  25mg --> metop tartrate 50mg qid;   HR goal ~70bpm  - holding entresto 2/15  - Aldactone and Farxiga can be started outpatient.   - Replete Mg. Monitor electrolytes. Keep K > 4 and Mg > 2.2.    # Orthostatic Hypotension - improved  - check orthostatic VS Q shift   - ACE wrap on LE, abdominal binder   - fall precautions     #Anemia  -gave 1uprbc 2/15  -anemia of chronic disease  -pt with hx of sigmoidectomy, pt is refusing a repeat CT abd/pelvis or to see GI at this time    # MARIANNE /HAGMA/ h/o nephrolithiasis   - resolved  - d/c Calcium Acetate   - monitor BUN/cr and urine output   - keep MAP above 65  -encourage po hydration   - CT noted: No acute abnormality in the abdomen and pelvis. No hydronephrosis, Left-sided nephrolithiasis.  -nephro signed off       # Transaminitis   - no RUQ pain, monitor LFTs   - avoid hepatotoxic meds, BP control and trend LFTs  -improving     # Gi prophylaxis   - on PPIs    #Progress Note Handoff  Pending (specify):  monitor BMP and BP today. prepare for dc tomorrow

## 2025-02-18 NOTE — DISCHARGE NOTE NURSING/CASE MANAGEMENT/SOCIAL WORK - FINANCIAL ASSISTANCE
[facility] provides services at a reduced cost to those who are determined to be eligible through [facility]’s financial assistance program. Information regarding [facility]’s financial assistance program can be found by going to [email or web address] or by calling [phone].

## 2025-02-18 NOTE — DISCHARGE NOTE PROVIDER - CARE PROVIDER_API CALL
Andi Cunningham.  Cardiology  [address] [place] [id number]  Phone: [phone]  Fax: [phone]  Established Patient  Follow Up Time: 1 week    Yan Ayon  Internal Medicine  [address] [place] [address]  Phone: [phone]  Fax: [phone]  Established Patient  Follow Up Time: 2 weeks

## 2025-02-18 NOTE — DISCHARGE NOTE PROVIDER - HOSPITAL COURSE
A 74-year-old male with a history of hypertension, dyslipidemia, mitral valve regurgitation s/p valvuloplasty in 1999, atrial fibrillation on Coumadin, heart failure with reduced ejection fraction (HFrEF), and an AICD presented with weakness, fatigue, anorexia, poor oral intake, significant weight loss, and worsening acute kidney injury (MARIANNE) over several weeks. His creatinine edvin from 1.2 to 5.3.  He was hypotensive, requiring pressors (levophed, phenylephrine, vasopressin, and low-dose norepinephrine). The source of shock was unclear.  Infections were ruled out with negative cultures and imaging. He developed rapid atrial fibrillation and was started on amiodarone. His Coumadin was held due to a supratherapeutic INR and later resumed with dose adjustment per CYP cohepatic meabolism. He was found to have sick euthyroid syndrome and a low AM cortisol, but an ACTH stimulation test ruled out adrenal insufficiency. Steroids were briefly started then discontinued. Endocrinology followed. His home medications for heart failure and hypertension were held due to hypotension. He was also started on bicarbonate for high anion gap metabolic acidosis (HAGMA), now discontinued. His hemodynamics stabilized, and he was weaned off pressors. Patient was then hemodynamically stable, off pressors, amio stopped, downgraded to SDU and TELE thereafter. Pt switched to Eliquis and his metop was reintroduced with a goal dose of 100/day per his cardiologist. Pt noted to have asymptomatic orthostasis during hospitalization, managed with fluid resuscitation, LE ACE wrap, abdominal binder, and fall precautions.    2/17 pt on RA, VS, deemed stable for DC. DC discussed with attending physician Dr. Farnsworth. A 74-year-old male with a history of hypertension, dyslipidemia, mitral valve regurgitation s/p valvuloplasty in 1999, atrial fibrillation on Coumadin, heart failure with reduced ejection fraction (HFrEF), and an AICD presented with weakness, fatigue, anorexia, poor oral intake, significant weight loss, and worsening acute kidney injury (MARIANNE) over several weeks. His creatinine edvin from 1.2 to 5.3.  He was hypotensive, requiring pressors (levophed, phenylephrine, vasopressin, and low-dose norepinephrine). The source of shock was unclear.  Infections were ruled out with negative cultures and imaging. He developed rapid atrial fibrillation and was started on amiodarone. His Coumadin was held due to a supratherapeutic INR and later resumed with dose adjustment per CYP cohepatic meabolism. He was found to have sick euthyroid syndrome and a low AM cortisol, but an ACTH stimulation test ruled out adrenal insufficiency. Steroids were briefly started then discontinued. Endocrinology followed. His home medications for heart failure and hypertension were held due to hypotension. He was also started on bicarbonate for high anion gap metabolic acidosis (HAGMA), now discontinued. His hemodynamics stabilized, and he was weaned off pressors. Patient was then hemodynamically stable, off pressors, amio stopped, downgraded to SDU and TELE thereafter. Pt switched to Eliquis and his metop was reintroduced with a goal dose of 100/day per his cardiologist. Pt noted to have asymptomatic orthostasis during hospitalization, managed with fluid resuscitation, LE ACE wrap, abdominal binder, and fall precautions.    2/18 pt on RA, VS, deemed stable for DC. DC discussed with attending physician Dr. Farnsworth. A 74-year-old male with a history of hypertension, dyslipidemia, mitral valve regurgitation s/p valvuloplasty in 1999, atrial fibrillation on Coumadin, heart failure with reduced ejection fraction (HFrEF), and an AICD presented with weakness, fatigue, anorexia, poor oral intake, significant weight loss, and worsening acute kidney injury (MARIANNE) over several weeks. His creatinine edvin from 1.2 to 5.3.  He was hypotensive, requiring pressors (levophed, phenylephrine, vasopressin, and low-dose norepinephrine). The source of shock was unclear.  Infections were ruled out with negative cultures and imaging. He developed rapid atrial fibrillation and was started on amiodarone. His Coumadin was held due to a supratherapeutic INR and later resumed with dose adjustment per CYP cohepatic meabolism. He was found to have sick euthyroid syndrome and a low AM cortisol, but an ACTH stimulation test ruled out adrenal insufficiency. Steroids were briefly started then discontinued. Endocrinology followed. His home medications for heart failure and hypertension were held due to hypotension. He was also started on bicarbonate for high anion gap metabolic acidosis (HAGMA), now discontinued. His hemodynamics stabilized, and he was weaned off pressors. Patient was then hemodynamically stable, off pressors, amio stopped, downgraded to SDU and TELE thereafter. Pt switched to Eliquis and his metop was reintroduced with a goal dose of 100/day per his cardiologist. Pt noted to have asymptomatic orthostasis during hospitalization that resolved, managed with fluid resuscitation, LE ACE wrap, abdominal binder. He was followed by EP for tachy-eneida events and ambulatory tachycardia, ICD was modified to HR 50 and metoprolol titrated up to 200 BID. Pt worked with PT and was deemed stable for DC.    2/28 deemed stable for DC. DC discussed with attending physician Dr. Canela.

## 2025-02-18 NOTE — DISCHARGE NOTE NURSING/CASE MANAGEMENT/SOCIAL WORK - PATIENT PORTAL LINK FT
You can access the FollowMyHealth Patient Portal offered by [facility] by registering at the following website: [email or web address]. By joining [employer]’s FollowMyHealth portal, you will also be able to view your health information using other applications (apps) compatible with our system.

## 2025-02-18 NOTE — DISCHARGE NOTE PROVIDER - NSDCMRMEDTOKEN_GEN_ALL_CORE_FT
Aldactone 25 mg oral tablet: 1 tab(s) orally once a day (at bedtime)  Entresto 97 mg-103 mg oral tablet: 1 tab(s) orally 2 times a day  ezetimibe 10 mg oral tablet: 1 tab(s) orally once a day  Farxiga 10 mg oral tablet: 1 tab(s) orally once a day  metoprolol succinate 100 mg oral tablet, extended release: 1 tab(s) orally once a day (in the afternoon)  metoprolol succinate 200 mg oral capsule, extended release: 1 cap(s) orally once a day (in the morning)  NexIUM 20 mg oral delayed release capsule: 1 cap(s) orally once a day (at bedtime)  rosuvastatin 40 mg oral tablet: 1 tab(s) orally once a day  warfarin 5 mg oral tablet: 1 tab(s) orally once a day   Aldactone 25 mg oral tablet: 1 tab(s) orally once a day (at bedtime)  apixaban 5 mg oral tablet: 1 tab(s) orally 2 times a day  Entresto 97 mg-103 mg oral tablet: 1 tab(s) orally 2 times a day  ezetimibe 10 mg oral tablet: 1 tab(s) orally once a day  Farxiga 10 mg oral tablet: 1 tab(s) orally once a day  metoprolol succinate 100 mg oral tablet, extended release: 1 tab(s) orally once a day (in the afternoon)  metoprolol succinate 200 mg oral capsule, extended release: 1 cap(s) orally once a day (in the morning)  NexIUM 20 mg oral delayed release capsule: 1 cap(s) orally once a day (at bedtime)  rosuvastatin 40 mg oral tablet: 1 tab(s) orally once a day   apixaban 5 mg oral tablet: 1 tab(s) orally 2 times a day  digoxin 125 mcg (0.125 mg) oral tablet: 1 tab(s) orally once a day  ezetimibe 10 mg oral tablet: 1 tab(s) orally once a day  Farxiga 10 mg oral tablet: 1 tab(s) orally once a day  metoprolol tartrate 100 mg oral tablet: 2 tab(s) orally every 12 hours  midodrine 10 mg oral tablet: 1 tab(s) orally every 8 hours  NexIUM 20 mg oral delayed release capsule: 1 cap(s) orally once a day (at bedtime)  rosuvastatin 40 mg oral tablet: 1 tab(s) orally once a day   apixaban 5 mg oral tablet: 1 tab(s) orally 2 times a day  digoxin 125 mcg (0.125 mg) oral tablet: 1 tab(s) orally once a day  ezetimibe 10 mg oral tablet: 1 tab(s) orally once a day  Farxiga 10 mg oral tablet: 1 tab(s) orally once a day  metoprolol tartrate 100 mg oral tablet: 2 tab(s) orally every 12 hours  midodrine 10 mg oral tablet: 1 tab(s) orally every 8 hours  Protonix 40 mg oral delayed release tablet: 1 tab(s) orally once a day  rosuvastatin 40 mg oral tablet: 1 tab(s) orally once a day

## 2025-02-18 NOTE — DISCHARGE NOTE PROVIDER - NSDCCPCAREPLAN_GEN_ALL_CORE_FT
PRINCIPAL DISCHARGE DIAGNOSIS  Diagnosis: Hypotension  Assessment and Plan of Treatment: You came in to the hospital with weakness and were found to have severe low blood pressure (hypotension and shock). You were managed in the ICU with pressors and fluids. You also developed high heart rate (A Fib) and required antiarrhythmic medications. Your blood pressure and heart rate improved and you were then managed in the stepdown and telemetry units. You were deemed stable for discharge.  Please take your medications as perscribed. Please call [phone] for any emergencies. Please follow with your PCP and cardiologist after discharge.

## 2025-02-18 NOTE — DISCHARGE NOTE PROVIDER - PROVIDER TOKENS
PROVIDER:[TOKEN:[[id number]],FOLLOWUP:[1 week],ESTABLISHEDPATIENT:[T]],PROVIDER:[TOKEN:[[id number]],FOLLOWUP:[2 weeks],ESTABLISHEDPATIENT:[T]]

## 2025-02-18 NOTE — DISCHARGE NOTE NURSING/CASE MANAGEMENT/SOCIAL WORK - NSDCPEFALRISK_GEN_ALL_CORE
For information on Fall & Injury Prevention, visit: [email or web address] OR  [email or web address] OR  https://www.cdc.gov/steadi/patient.html

## 2025-02-19 LAB
ALBUMIN SERPL ELPH-MCNC: 3.4 G/DL — LOW (ref 3.5–5.2)
ALP SERPL-CCNC: 63 U/L — SIGNIFICANT CHANGE UP (ref 30–115)
ALT FLD-CCNC: 37 U/L — SIGNIFICANT CHANGE UP (ref 0–41)
ANION GAP SERPL CALC-SCNC: 13 MMOL/L — SIGNIFICANT CHANGE UP (ref 7–14)
ANION GAP SERPL CALC-SCNC: 9 MMOL/L — SIGNIFICANT CHANGE UP (ref 7–14)
AST SERPL-CCNC: 30 U/L — SIGNIFICANT CHANGE UP (ref 0–41)
BILIRUB SERPL-MCNC: 0.9 MG/DL — SIGNIFICANT CHANGE UP (ref 0.2–1.2)
BUN SERPL-MCNC: 17 MG/DL — SIGNIFICANT CHANGE UP (ref 10–20)
BUN SERPL-MCNC: 19 MG/DL — SIGNIFICANT CHANGE UP (ref 10–20)
CALCIUM SERPL-MCNC: 8.4 MG/DL — SIGNIFICANT CHANGE UP (ref 8.4–10.5)
CALCIUM SERPL-MCNC: 8.7 MG/DL — SIGNIFICANT CHANGE UP (ref 8.4–10.4)
CHLORIDE SERPL-SCNC: 107 MMOL/L — SIGNIFICANT CHANGE UP (ref 98–110)
CHLORIDE SERPL-SCNC: 108 MMOL/L — SIGNIFICANT CHANGE UP (ref 98–110)
CO2 SERPL-SCNC: 20 MMOL/L — SIGNIFICANT CHANGE UP (ref 17–32)
CO2 SERPL-SCNC: 24 MMOL/L — SIGNIFICANT CHANGE UP (ref 17–32)
CREAT SERPL-MCNC: 0.9 MG/DL — SIGNIFICANT CHANGE UP (ref 0.7–1.5)
CREAT SERPL-MCNC: 0.9 MG/DL — SIGNIFICANT CHANGE UP (ref 0.7–1.5)
EGFR: 90 ML/MIN/1.73M2 — SIGNIFICANT CHANGE UP
EGFR: 90 ML/MIN/1.73M2 — SIGNIFICANT CHANGE UP
GLUCOSE SERPL-MCNC: 80 MG/DL — SIGNIFICANT CHANGE UP (ref 70–99)
GLUCOSE SERPL-MCNC: 86 MG/DL — SIGNIFICANT CHANGE UP (ref 70–99)
HCT VFR BLD CALC: 28.4 % — LOW (ref 42–52)
HGB BLD-MCNC: 8.3 G/DL — LOW (ref 14–18)
MCHC RBC-ENTMCNC: 29.2 G/DL — LOW (ref 32–37)
MCHC RBC-ENTMCNC: 30.7 PG — SIGNIFICANT CHANGE UP (ref 27–31)
MCV RBC AUTO: 105.2 FL — HIGH (ref 80–94)
NRBC BLD AUTO-RTO: 0 /100 WBCS — SIGNIFICANT CHANGE UP (ref 0–0)
PLATELET # BLD AUTO: 174 K/UL — SIGNIFICANT CHANGE UP (ref 130–400)
PMV BLD: 12.2 FL — HIGH (ref 7.4–10.4)
POTASSIUM SERPL-MCNC: 5.1 MMOL/L — HIGH (ref 3.5–5)
POTASSIUM SERPL-MCNC: 5.8 MMOL/L — HIGH (ref 3.5–5)
POTASSIUM SERPL-SCNC: 5.1 MMOL/L — HIGH (ref 3.5–5)
POTASSIUM SERPL-SCNC: 5.8 MMOL/L — HIGH (ref 3.5–5)
PROT SERPL-MCNC: 5.5 G/DL — LOW (ref 6–8)
RBC # BLD: 2.7 M/UL — LOW (ref 4.7–6.1)
RBC # FLD: 15 % — HIGH (ref 11.5–14.5)
SODIUM SERPL-SCNC: 140 MMOL/L — SIGNIFICANT CHANGE UP (ref 135–146)
SODIUM SERPL-SCNC: 141 MMOL/L — SIGNIFICANT CHANGE UP (ref 135–146)
WBC # BLD: 5.3 K/UL — SIGNIFICANT CHANGE UP (ref 4.8–10.8)
WBC # FLD AUTO: 5.3 K/UL — SIGNIFICANT CHANGE UP (ref 4.8–10.8)

## 2025-02-19 PROCEDURE — 99232 SBSQ HOSP IP/OBS MODERATE 35: CPT

## 2025-02-19 PROCEDURE — 99233 SBSQ HOSP IP/OBS HIGH 50: CPT

## 2025-02-19 RX ORDER — DIGOXIN 250 UG/1
125 TABLET ORAL DAILY
Refills: 0 | Status: DISCONTINUED | OUTPATIENT
Start: 2025-02-19 | End: 2025-02-28

## 2025-02-19 RX ORDER — SODIUM ZIRCONIUM CYCLOSILICATE 5 G/5G
5 POWDER, FOR SUSPENSION ORAL ONCE
Refills: 0 | Status: COMPLETED | OUTPATIENT
Start: 2025-02-19 | End: 2025-02-19

## 2025-02-19 RX ORDER — METOPROLOL SUCCINATE 50 MG/1
50 TABLET, EXTENDED RELEASE ORAL EVERY 6 HOURS
Refills: 0 | Status: DISCONTINUED | OUTPATIENT
Start: 2025-02-19 | End: 2025-02-20

## 2025-02-19 RX ADMIN — Medication 1 APPLICATION(S): at 11:40

## 2025-02-19 RX ADMIN — DIGOXIN 125 MICROGRAM(S): 250 TABLET ORAL at 18:41

## 2025-02-19 RX ADMIN — MIDODRINE HYDROCHLORIDE 15 MILLIGRAM(S): 5 TABLET ORAL at 21:51

## 2025-02-19 RX ADMIN — EZETIMIBE 10 MILLIGRAM(S): 10 TABLET ORAL at 11:38

## 2025-02-19 RX ADMIN — METOPROLOL SUCCINATE 50 MILLIGRAM(S): 50 TABLET, EXTENDED RELEASE ORAL at 05:38

## 2025-02-19 RX ADMIN — MIDODRINE HYDROCHLORIDE 15 MILLIGRAM(S): 5 TABLET ORAL at 13:29

## 2025-02-19 RX ADMIN — APIXABAN 5 MILLIGRAM(S): 2.5 TABLET, FILM COATED ORAL at 05:38

## 2025-02-19 RX ADMIN — METOPROLOL SUCCINATE 50 MILLIGRAM(S): 50 TABLET, EXTENDED RELEASE ORAL at 11:39

## 2025-02-19 RX ADMIN — Medication 400 MILLIGRAM(S): at 17:18

## 2025-02-19 RX ADMIN — Medication 400 MILLIGRAM(S): at 11:38

## 2025-02-19 RX ADMIN — METOPROLOL SUCCINATE 50 MILLIGRAM(S): 50 TABLET, EXTENDED RELEASE ORAL at 18:40

## 2025-02-19 RX ADMIN — Medication 400 MILLIGRAM(S): at 08:08

## 2025-02-19 RX ADMIN — MIDODRINE HYDROCHLORIDE 15 MILLIGRAM(S): 5 TABLET ORAL at 05:38

## 2025-02-19 RX ADMIN — Medication 40 MILLIGRAM(S): at 11:39

## 2025-02-19 RX ADMIN — SODIUM ZIRCONIUM CYCLOSILICATE 5 GRAM(S): 5 POWDER, FOR SUSPENSION ORAL at 21:51

## 2025-02-19 RX ADMIN — APIXABAN 5 MILLIGRAM(S): 2.5 TABLET, FILM COATED ORAL at 17:18

## 2025-02-19 NOTE — PROGRESS NOTE ADULT - SUBJECTIVE AND OBJECTIVE BOX
ALLIE TORRE 74y Male  MRN#: [id number]   Hospital Day: 15d    HPI:    Patient is a 74-year-old male with past medical history of hypertension, dyslipidemia, mitral valve regurg, A-fib on Coumadin,HFrEF with an EF of 45% in december , and status post AICD presenting for weakness. Patient endorses persistent, complaining generalized weakness, fatigue, anorexia and poor p.o. intake for the past 5-6 weeks. Patient also reports a significant weight loss. Patient  states he had recent blood work which showed worsening kidney function, creatinine was 3.1 in  january up from a baseline of 1,2 in december and currently creatinine in 5.3.         · BP   65 mm Hg/45 mm Hg  · Heart Rate  86 /min  · Temp (C)  36.9 Degrees C oral   · SpO2 (%)  98 % on room air      (04 Feb 2025 13:48)      SUBJECTIVE  Patient is a 74y old Male who presents with a chief complaint of weakness (16 Feb 2025 11:59)  Currently admitted to medicine with the primary diagnosis of Hypotension      INTERVAL HPI AND OVERNIGHT EVENTS:  Patient was examined and seen at bedside. Comfortable this AM    OBJECTIVE  PAST MEDICAL & SURGICAL HISTORY    ALLERGIES:  No Known Allergies    MEDICATIONS:  STANDING MEDICATIONS  apixaban 5 milliGRAM(s) Oral two times a day  chlorhexidine 2% Cloths 1 Application(s) Topical daily  ezetimibe 10 milliGRAM(s) Oral daily  magnesium oxide 400 milliGRAM(s) Oral three times a day with meals  metoprolol tartrate 50 milliGRAM(s) Oral every 6 hours  midodrine 15 milliGRAM(s) Oral every 8 hours  pantoprazole    Tablet 40 milliGRAM(s) Oral daily    PRN MEDICATIONS  lidocaine 5% Ointment 1 Application(s) Topical four times a day PRN      VITAL SIGNS: Last 24 Hours  T(C): 36.6 (19 Feb 2025 12:12), Max: 37.1 (19 Feb 2025 08:50)  T(F): 97.8 (19 Feb 2025 12:12), Max: 98.7 (19 Feb 2025 08:50)  HR: 81 (19 Feb 2025 12:12) (77 - 103)  BP: 104/68 (19 Feb 2025 12:12) (96/62 - 107/66)  BP(mean): 76 (19 Feb 2025 08:50) (76 - 76)  RR: 18 (19 Feb 2025 12:12) (17 - 19)  SpO2: 95% (19 Feb 2025 12:12) (95% - 97%)    LABS:                        8.3    5.30  )-----------( 174      ( 19 Feb 2025 06:09 )             28.4     02-19    141  |  108  |  17  ----------------------------<  86  5.8[H]   |  20  |  0.9    Ca    8.7      19 Feb 2025 06:09    TPro  5.5[L]  /  Alb  3.4[L]  /  TBili  0.9  /  DBili  x   /  AST  30  /  ALT  37  /  AlkPhos  63  02-19      Urinalysis Basic - ( 19 Feb 2025 06:09 )    Color: x / Appearance: x / SG: x / pH: x  Gluc: 86 mg/dL / Ketone: x  / Bili: x / Urobili: x   Blood: x / Protein: x / Nitrite: x   Leuk Esterase: x / RBC: x / WBC x   Sq Epi: x / Non Sq Epi: x / Bacteria: x        PHYSICAL EXAM:  CONSTITUTIONAL: No acute distress  HEAD: Atraumatic, normocephalic  EYES: EOM intact, PERRLA, conjunctiva and sclera clear  ENT: Supple, no masses, no thyromegaly, no bruits, no JVD; moist mucous membranes  PULMONARY: Clear to auscultation bilaterally; no wheezes, rales, or rhonchi  CARDIOVASCULAR: Regular rate and rhythm; no murmurs, rubs, or gallops  GASTROINTESTINAL: Soft, non-tender, non-distended; bowel sounds present  MUSCULOSKELETAL: 2+ peripheral pulses; no clubbing, no cyanosis  NEUROLOGY: non-focal    ASSESSMENT & PLAN      74-year-old male with past medical history of hypertension, dyslipidemia, mitral valve regurg, A-fib on Coumadin,HFrEF with an EF of 45% in december , and status post AICD presenting for weakness. Patient endorses persistent, complaining generalized weakness, fatigue, anorexia and poor p.o. intake for the past 5-6 weeks. Patient also reports a significant weight loss. Patient  states he had recent blood work which showed worsening kidney function, creatinine was 3.1 in  january up from a baseline of 1,2 in december and currently creatinine in 5.3.     2/19/2025  - HR better controlled this AM, currently on metopr tartat 50 qid  - DC planning  ------------  # Hypovolemic Shock due to polypharmacy   # A-fib with RVR   # HFrEF 45%/ h/o AICD  - currently HDS, afebrile and sating well on RA  -  off pressors now  -  c/w midodrine 15 q8, wean as tolerated   - TSH low, Ft4 normal, t3 low, euthyroid SS, discussed with endocrine yesterday, no meds needed and outpt f/u for repeat TFTs in 4-6 weeks   - TTE: EF 45-50% with moderate TR only  - case discussed with pt cardio Dr Cunningham ,  echo unchanged since Nov 2023, cath was ok a few years ago.   - consulted by cardiology , recommendations noted  - metoprolol  25mg --> metop tartrate 50mg qid;   HR goal ~70bpm  - holding entresto 2/15  - Aldactone and Farxiga can be started outpatient.   - Replete Mg. Monitor electrolytes. Keep K > 4 and Mg > 2.2.    # Orthostatic Hypotension - improved  - check orthostatic VS Q shift   - ACE wrap on LE, abdominal binder   - fall precautions     #Anemia  -gave 1uprbc 2/15  -anemia of chronic disease  -pt with hx of sigmoidectomy, pt is refusing a repeat CT abd/pelvis or to see GI at this time    # MARIANNE /HAGMA/ h/o nephrolithiasis   - resolved  - d/c Calcium Acetate   - monitor BUN/cr and urine output   - keep MAP above 65  -encourage po hydration   - CT noted: No acute abnormality in the abdomen and pelvis. No hydronephrosis, Left-sided nephrolithiasis.  -nephro signed off     # Transaminitis   - no RUQ pain, monitor LFTs   - avoid hepatotoxic meds, BP control and trend LFTs  -improving     # Gi prophylaxis   - on PPIs

## 2025-02-19 NOTE — PROGRESS NOTE ADULT - ASSESSMENT
74-year-old male with past medical history of hypertension, dyslipidemia, mitral valve regurg, A-fib on Coumadin,HFrEF with an EF of 45% in december , and status post AICD presenting for weakness.      - CHF s/p icd on 11/20/2018  - mitral valve regurg s/p annuloplasty  - AF with RVR  - HTN    ICD was interrogated, device functions well. Tachy therapy zones were reprogrammed, so AF with RVR would not receive inappropriate shocks.   c/w anticoagulation for thromboembolic prophylaxis ChadsVasc of at least 3  transaminitis noted on Amiodarone, stopped  on Metoprolol 50 QID, heart rate spikes with minimal physical activities  start Digoxin 0.125 mg daily for rate control  patient with permanent AFIB, CV will not recommended  Follow up with Dr. Reddy as outpatient  Keep electrolytes WNL  Treatment as per primary team   74-year-old male with past medical history of hypertension, dyslipidemia, mitral valve regurg, A-fib on Coumadin,HFrEF with an EF of 45% in december , and status post AICD presenting for weakness.      - CHF s/p icd on 11/20/2018  - mitral valve regurg s/p annuloplasty  - AF with RVR  - HTN    ICD was interrogated, device functions well. Tachy therapy zones were reprogrammed, so AF with RVR would not receive inappropriate shocks.   c/w anticoagulation for thromboembolic prophylaxis ChadsVasc of at least 3  transaminitis noted on Amiodarone, stopped  on Metoprolol 50 QID, heart rate spikes with minimal physical activities  start Digoxin 0.125 mg daily for rate control. Metoprolol dose may be titrated down while started  on Digoxin.  patient with permanent AFIB, CV will not recommended  Follow up with Dr. Reddy as outpatient  Keep electrolytes WNL  Treatment as per primary team

## 2025-02-19 NOTE — PROGRESS NOTE ADULT - SUBJECTIVE AND OBJECTIVE BOX
INTERVAL HPI/ OVERNIGHT EVENTS:        MEDICATIONS  (STANDING):  apixaban 5 milliGRAM(s) Oral two times a day  chlorhexidine 2% Cloths 1 Application(s) Topical daily  ezetimibe 10 milliGRAM(s) Oral daily  magnesium oxide 400 milliGRAM(s) Oral three times a day with meals  metoprolol tartrate 50 milliGRAM(s) Oral every 6 hours  midodrine 15 milliGRAM(s) Oral every 8 hours  pantoprazole    Tablet 40 milliGRAM(s) Oral daily    MEDICATIONS  (PRN):  lidocaine 5% Ointment 1 Application(s) Topical four times a day PRN pain at [facility]      Allergies    No Known Allergies    Intolerances        REVIEW OF SYSTEMS    [ ] A ten-point review of systems was otherwise negative except as noted.  [ ] Due to altered mental status/intubation, subjective information were not able to be obtained from the patient. History was obtained, to the extent possible, from review of the chart and collateral sources of information.      Vital Signs Last 24 Hrs  T(C): 36.2 (19 Feb 2025 13:04), Max: 37.1 (19 Feb 2025 08:50)  T(F): 97.2 (19 Feb 2025 13:04), Max: 98.7 (19 Feb 2025 08:50)  HR: 81 (19 Feb 2025 13:04) (77 - 92)  BP: 104/68 (19 Feb 2025 13:04) (96/62 - 107/66)  BP(mean): 76 (19 Feb 2025 08:50) (76 - 76)  RR: 17 (19 Feb 2025 13:04) (17 - 18)  SpO2: 95% (19 Feb 2025 12:12) (95% - 97%)    Parameters below as of 19 Feb 2025 12:12  Patient On (Oxygen Delivery Method): room air        02-18-25 @ 07:01  -  02-19-25 @ 07:00  --------------------------------------------------------  IN: 766 mL / OUT: 2650 mL / NET: -1884 mL    02-19-25 @ 07:01  -  02-19-25 @ 15:57  --------------------------------------------------------  IN: 526 mL / OUT: 600 mL / NET: -74 mL        PHYSICAL EXAM:    GENERAL: In no apparent distress, well nourished, and hydrated.  HEART: Regular rate and rhythm; No murmur; NO rubs, or gallops.  PULMONARY: Clear to auscultation and percussion.  Normal expansion/effort. No rales, wheezing, or rhonchi bilaterally.  ABDOMEN: Soft, Nontender, Nondistended; Bowel sounds present  EXTREMITIES:  Extremities warm, pink, well-perfused, 2+ Peripheral Pulses, No clubbing, cyanosis, or edema  NEUROLOGICAL: alert & oriented x 3, no focal deficits, PERRLA, EOMI    LABS:                        8.3    5.30  )-----------( 174      ( 19 Feb 2025 06:09 )             28.4     02-19    141  |  108  |  17  ----------------------------<  86  5.8[H]   |  20  |  0.9    Ca    8.7      19 Feb 2025 06:09    TPro  5.5[L]  /  Alb  3.4[L]  /  TBili  0.9  /  DBili  x   /  AST  30  /  ALT  37  /  AlkPhos  63  02-19      Urinalysis Basic - ( 19 Feb 2025 06:09 )    Color: x / Appearance: x / SG: x / pH: x  Gluc: 86 mg/dL / Ketone: x  / Bili: x / Urobili: x   Blood: x / Protein: x / Nitrite: x   Leuk Esterase: x / RBC: x / WBC x   Sq Epi: x / Non Sq Epi: x / Bacteria: x              RADIOLOGY & ADDITIONAL TESTS:       INTERVAL HPI/ OVERNIGHT EVENTS:  Patient was seen today.   Telemetry was reviewed - AFIB, rate control at rest and spikes while ambulating.     MEDICATIONS  (STANDING):  apixaban 5 milliGRAM(s) Oral two times a day  chlorhexidine 2% Cloths 1 Application(s) Topical daily  ezetimibe 10 milliGRAM(s) Oral daily  magnesium oxide 400 milliGRAM(s) Oral three times a day with meals  metoprolol tartrate 50 milliGRAM(s) Oral every 6 hours  midodrine 15 milliGRAM(s) Oral every 8 hours  pantoprazole    Tablet 40 milliGRAM(s) Oral daily    MEDICATIONS  (PRN):  lidocaine 5% Ointment 1 Application(s) Topical four times a day PRN pain at [place] area      Allergies    No Known Allergies  Intolerances    REVIEW OF SYSTEMS  [x] A ten-point review of systems was otherwise negative except as noted.  [ ] Due to altered mental status/intubation, subjective information were not able to be obtained from the patient. History was obtained, to the extent possible, from review of the chart and collateral sources of information.      Vital Signs Last 24 Hrs  T(C): 36.2 (19 Feb 2025 13:04), Max: 37.1 (19 Feb 2025 08:50)  T(F): 97.2 (19 Feb 2025 13:04), Max: 98.7 (19 Feb 2025 08:50)  HR: 81 (19 Feb 2025 13:04) (77 - 92)  BP: 104/68 (19 Feb 2025 13:04) (96/62 - 107/66)  BP(mean): 76 (19 Feb 2025 08:50) (76 - 76)  RR: 17 (19 Feb 2025 13:04) (17 - 18)  SpO2: 95% (19 Feb 2025 12:12) (95% - 97%)    Parameters below as of 19 Feb 2025 12:12  Patient On (Oxygen Delivery Method): room air        02-18-25 @ 07:01  -  02-19-25 @ 07:00  --------------------------------------------------------  IN: 766 mL / OUT: 2650 mL / NET: -1884 mL    02-19-25 @ 07:01  -  02-19-25 @ 15:57  --------------------------------------------------------  IN: 526 mL / OUT: 600 mL / NET: -74 mL        PHYSICAL EXAM:    GENERAL: In no apparent distress, well nourished, and hydrated.  HEART: Regular rate and rhythm; No murmur; NO rubs, or gallops.  PULMONARY: Clear to auscultation and percussion.  Normal expansion/effort. No rales, wheezing, or rhonchi bilaterally.  ABDOMEN: Soft, Nontender, Nondistended; Bowel sounds present  EXTREMITIES:  Extremities warm, pink, well-perfused, 2+ Peripheral Pulses, No clubbing, cyanosis, or edema  NEUROLOGICAL: alert & oriented x 3, no focal deficits, PERMEGHANN SANCHEZ    LABS:                        8.3    5.30  )-----------( 174      ( 19 Feb 2025 06:09 )             28.4     02-19    141  |  108  |  17  ----------------------------<  86  5.8[H]   |  20  |  0.9    Ca    8.7      19 Feb 2025 06:09    TPro  5.5[L]  /  Alb  3.4[L]  /  TBili  0.9  /  DBili  x   /  AST  30  /  ALT  37  /  AlkPhos  63  02-19      Urinalysis Basic - ( 19 Feb 2025 06:09 )    Color: x / Appearance: x / SG: x / pH: x  Gluc: 86 mg/dL / Ketone: x  / Bili: x / Urobili: x   Blood: x / Protein: x / Nitrite: x   Leuk Esterase: x / RBC: x / WBC x   Sq Epi: x / Non Sq Epi: x / Bacteria: x              RADIOLOGY & ADDITIONAL TESTS:

## 2025-02-20 LAB
ALBUMIN SERPL ELPH-MCNC: 3.4 G/DL — LOW (ref 3.5–5.2)
ALP SERPL-CCNC: 63 U/L — SIGNIFICANT CHANGE UP (ref 30–115)
ALT FLD-CCNC: 30 U/L — SIGNIFICANT CHANGE UP (ref 0–41)
ANION GAP SERPL CALC-SCNC: 8 MMOL/L — SIGNIFICANT CHANGE UP (ref 7–14)
AST SERPL-CCNC: 14 U/L — SIGNIFICANT CHANGE UP (ref 0–41)
BILIRUB SERPL-MCNC: 0.7 MG/DL — SIGNIFICANT CHANGE UP (ref 0.2–1.2)
BUN SERPL-MCNC: 17 MG/DL — SIGNIFICANT CHANGE UP (ref 10–20)
CALCIUM SERPL-MCNC: 8.5 MG/DL — SIGNIFICANT CHANGE UP (ref 8.4–10.4)
CHLORIDE SERPL-SCNC: 106 MMOL/L — SIGNIFICANT CHANGE UP (ref 98–110)
CO2 SERPL-SCNC: 26 MMOL/L — SIGNIFICANT CHANGE UP (ref 17–32)
CREAT SERPL-MCNC: 0.9 MG/DL — SIGNIFICANT CHANGE UP (ref 0.7–1.5)
EGFR: 90 ML/MIN/1.73M2 — SIGNIFICANT CHANGE UP
GLUCOSE SERPL-MCNC: 80 MG/DL — SIGNIFICANT CHANGE UP (ref 70–99)
HCT VFR BLD CALC: 26.2 % — LOW (ref 42–52)
HGB BLD-MCNC: 8 G/DL — LOW (ref 14–18)
MCHC RBC-ENTMCNC: 30.5 G/DL — LOW (ref 32–37)
MCHC RBC-ENTMCNC: 30.7 PG — SIGNIFICANT CHANGE UP (ref 27–31)
MCV RBC AUTO: 100.4 FL — HIGH (ref 80–94)
NRBC BLD AUTO-RTO: 0 /100 WBCS — SIGNIFICANT CHANGE UP (ref 0–0)
PLATELET # BLD AUTO: 243 K/UL — SIGNIFICANT CHANGE UP (ref 130–400)
PMV BLD: 10.6 FL — HIGH (ref 7.4–10.4)
POTASSIUM SERPL-MCNC: 4.4 MMOL/L — SIGNIFICANT CHANGE UP (ref 3.5–5)
POTASSIUM SERPL-SCNC: 4.4 MMOL/L — SIGNIFICANT CHANGE UP (ref 3.5–5)
PROT SERPL-MCNC: 5.2 G/DL — LOW (ref 6–8)
RBC # BLD: 2.61 M/UL — LOW (ref 4.7–6.1)
RBC # FLD: 14.7 % — HIGH (ref 11.5–14.5)
SODIUM SERPL-SCNC: 140 MMOL/L — SIGNIFICANT CHANGE UP (ref 135–146)
WBC # BLD: 5.06 K/UL — SIGNIFICANT CHANGE UP (ref 4.8–10.8)
WBC # FLD AUTO: 5.06 K/UL — SIGNIFICANT CHANGE UP (ref 4.8–10.8)

## 2025-02-20 PROCEDURE — 99232 SBSQ HOSP IP/OBS MODERATE 35: CPT

## 2025-02-20 RX ORDER — METOPROLOL SUCCINATE 50 MG/1
75 TABLET, EXTENDED RELEASE ORAL EVERY 8 HOURS
Refills: 0 | Status: DISCONTINUED | OUTPATIENT
Start: 2025-02-20 | End: 2025-02-24

## 2025-02-20 RX ADMIN — MIDODRINE HYDROCHLORIDE 15 MILLIGRAM(S): 5 TABLET ORAL at 21:11

## 2025-02-20 RX ADMIN — METOPROLOL SUCCINATE 50 MILLIGRAM(S): 50 TABLET, EXTENDED RELEASE ORAL at 01:06

## 2025-02-20 RX ADMIN — Medication 400 MILLIGRAM(S): at 17:01

## 2025-02-20 RX ADMIN — APIXABAN 5 MILLIGRAM(S): 2.5 TABLET, FILM COATED ORAL at 17:01

## 2025-02-20 RX ADMIN — EZETIMIBE 10 MILLIGRAM(S): 10 TABLET ORAL at 11:52

## 2025-02-20 RX ADMIN — Medication 1 APPLICATION(S): at 11:53

## 2025-02-20 RX ADMIN — MIDODRINE HYDROCHLORIDE 15 MILLIGRAM(S): 5 TABLET ORAL at 06:46

## 2025-02-20 RX ADMIN — METOPROLOL SUCCINATE 50 MILLIGRAM(S): 50 TABLET, EXTENDED RELEASE ORAL at 11:52

## 2025-02-20 RX ADMIN — Medication 40 MILLIGRAM(S): at 11:52

## 2025-02-20 RX ADMIN — MIDODRINE HYDROCHLORIDE 15 MILLIGRAM(S): 5 TABLET ORAL at 13:32

## 2025-02-20 RX ADMIN — METOPROLOL SUCCINATE 75 MILLIGRAM(S): 50 TABLET, EXTENDED RELEASE ORAL at 20:55

## 2025-02-20 RX ADMIN — Medication 400 MILLIGRAM(S): at 11:52

## 2025-02-20 RX ADMIN — DIGOXIN 125 MICROGRAM(S): 250 TABLET ORAL at 06:47

## 2025-02-20 RX ADMIN — APIXABAN 5 MILLIGRAM(S): 2.5 TABLET, FILM COATED ORAL at 06:46

## 2025-02-20 RX ADMIN — Medication 400 MILLIGRAM(S): at 08:14

## 2025-02-20 RX ADMIN — METOPROLOL SUCCINATE 50 MILLIGRAM(S): 50 TABLET, EXTENDED RELEASE ORAL at 06:47

## 2025-02-20 NOTE — PROGRESS NOTE ADULT - SUBJECTIVE AND OBJECTIVE BOX
ALLIE TORRE 74y Male  MRN#: [id number]   Hospital Day: 16d    HPI:    Patient is a 74-year-old male with past medical history of hypertension, dyslipidemia, mitral valve regurg, A-fib on Coumadin,HFrEF with an EF of 45% in december , and status post AICD presenting for weakness. Patient endorses persistent, complaining generalized weakness, fatigue, anorexia and poor p.o. intake for the past 5-6 weeks. Patient also reports a significant weight loss. Patient  states he had recent blood work which showed worsening kidney function, creatinine was 3.1 in  january up from a baseline of 1,2 in december and currently creatinine in 5.3.       · BP   65 mm Hg/45 mm Hg  · Heart Rate  86 /min  · Temp (C)  36.9 Degrees C oral   · SpO2 (%)  98 % on room air      (04 Feb 2025 13:48)      SUBJECTIVE  Patient is a 74y old Male who presents with a chief complaint of weakness (16 Feb 2025 11:59)  Currently admitted to medicine with the primary diagnosis of Hypotension    INTERVAL HPI AND OVERNIGHT EVENTS:  Patient was examined and seen at bedside. Comfortable this AM.    OBJECTIVE  PAST MEDICAL & SURGICAL HISTORY    ALLERGIES:  No Known Allergies    MEDICATIONS:  STANDING MEDICATIONS  apixaban 5 milliGRAM(s) Oral two times a day  chlorhexidine 2% Cloths 1 Application(s) Topical daily  digoxin     Tablet 125 MICROGram(s) Oral daily  ezetimibe 10 milliGRAM(s) Oral daily  magnesium oxide 400 milliGRAM(s) Oral three times a day with meals  metoprolol tartrate 50 milliGRAM(s) Oral every 6 hours  midodrine 15 milliGRAM(s) Oral every 8 hours  pantoprazole    Tablet 40 milliGRAM(s) Oral daily    PRN MEDICATIONS  lidocaine 5% Ointment 1 Application(s) Topical four times a day PRN      VITAL SIGNS: Last 24 Hours  T(C): 36.7 (20 Feb 2025 05:00), Max: 36.7 (20 Feb 2025 04:00)  T(F): 98 (20 Feb 2025 05:00), Max: 98.1 (20 Feb 2025 04:00)  HR: 78 (20 Feb 2025 05:00) (58 - 92)  BP: 100/62 (20 Feb 2025 05:00) (95/60 - 104/68)  BP(mean): 75 (20 Feb 2025 04:00) (75 - 75)  RR: 18 (20 Feb 2025 05:00) (17 - 18)  SpO2: 98% (20 Feb 2025 05:00) (95% - 98%)    LABS:                        8.0    5.06  )-----------( 243      ( 20 Feb 2025 05:42 )             26.2     02-20    140  |  106  |  17  ----------------------------<  80  4.4   |  26  |  0.9    Ca    8.5      20 Feb 2025 05:42    TPro  5.2[L]  /  Alb  3.4[L]  /  TBili  0.7  /  DBili  x   /  AST  14  /  ALT  30  /  AlkPhos  63  02-20      Urinalysis Basic - ( 20 Feb 2025 05:42 )    Color: x / Appearance: x / SG: x / pH: x  Gluc: 80 mg/dL / Ketone: x  / Bili: x / Urobili: x   Blood: x / Protein: x / Nitrite: x   Leuk Esterase: x / RBC: x / WBC x   Sq Epi: x / Non Sq Epi: x / Bacteria: x      PHYSICAL EXAM:  CONSTITUTIONAL: No acute distress  HEAD: Atraumatic, normocephalic  EYES: EOM intact, PERRLA, conjunctiva and sclera clear  ENT: Supple, no masses, no thyromegaly, no bruits, no JVD; moist mucous membranes  PULMONARY: Clear to auscultation bilaterally; no wheezes, rales, or rhonchi  CARDIOVASCULAR: Regular rate and rhythm; no murmurs, rubs, or gallops  GASTROINTESTINAL: Soft, non-tender, non-distended; bowel sounds present  MUSCULOSKELETAL: 2+ peripheral pulses; no clubbing, no cyanosis  NEUROLOGY: non-focal      ASSESSMENT & PLAN    74-year-old male with past medical history of hypertension, dyslipidemia, mitral valve regurg, A-fib on Coumadin,HFrEF with an EF of 45% in december , and status post AICD presenting for weakness. Patient endorses persistent, complaining generalized weakness, fatigue, anorexia and poor p.o. intake for the past 5-6 weeks. Patient also reports a significant weight loss. Patient  states he had recent blood work which showed worsening kidney function, creatinine was 3.1 in  january up from a baseline of 1,2 in december and currently creatinine in 5.3.     2/20/2025  - HR better controlled this AM  - EP evaluated yesterday, dig 125 added  - f/u walking HR this PM  -------------    # Hypovolemic Shock due to polypharmacy   # A-fib with RVR   # HFrEF 45%/ h/o AICD  - currently HDS, afebrile and sating well on RA  -  off pressors now  -  c/w midodrine 15 q8, wean as tolerated   - TSH low, Ft4 normal, t3 low, euthyroid SS, discussed with endocrine yesterday, no meds needed and outpt f/u for repeat TFTs in 4-6 weeks   - TTE: EF 45-50% with moderate TR only  - case discussed with pt cardio Dr Cunningham ,  echo unchanged since Nov 2023, cath was ok a few years ago.   - consulted by cardiology , recommendations noted  - metoprolol  25mg --> metop tartrate 50mg qid;   HR goal ~70bpm  - holding entresto 2/15  - Aldactone and Farxiga can be started outpatient.   - Replete Mg. Monitor electrolytes. Keep K > 4 and Mg > 2.2.  2/20 seen by EP yesterday, dig 125 added    # Orthostatic Hypotension - improved  - check orthostatic VS Q shift   - ACE wrap on LE, abdominal binder   - fall precautions     #Anemia  -gave 1uprbc 2/15  -anemia of chronic disease  -pt with hx of sigmoidectomy, pt is refusing a repeat CT abd/pelvis or to see GI at this time    # MARIANNE /HAGMA/ h/o nephrolithiasis   - resolved  - d/c Calcium Acetate   - monitor BUN/cr and urine output   - keep MAP above 65  -encourage po hydration   - CT noted: No acute abnormality in the abdomen and pelvis. No hydronephrosis, Left-sided nephrolithiasis.  -nephro signed off     # Transaminitis   - no RUQ pain, monitor LFTs   - avoid hepatotoxic meds, BP control and trend LFTs  -improving     # Gi prophylaxis   - on PPIs

## 2025-02-20 NOTE — PHARMACOTHERAPY INTERVENTION NOTE - COMMENTS
Patient started on digoxin 125 mcg po daily for a fib, today is day 2 no loading dose given. CrCl using IBW = 58 current dose appropriate. If remains on digoxin recommend obtaining digoxin trough after 3 to 5 days of treatment. Can obtain digoxin level with AM labs on 2/22.

## 2025-02-21 LAB
ALBUMIN SERPL ELPH-MCNC: 3.6 G/DL — SIGNIFICANT CHANGE UP (ref 3.5–5.2)
ALP SERPL-CCNC: 65 U/L — SIGNIFICANT CHANGE UP (ref 30–115)
ALT FLD-CCNC: 27 U/L — SIGNIFICANT CHANGE UP (ref 0–41)
ANION GAP SERPL CALC-SCNC: 10 MMOL/L — SIGNIFICANT CHANGE UP (ref 7–14)
AST SERPL-CCNC: 14 U/L — SIGNIFICANT CHANGE UP (ref 0–41)
BILIRUB SERPL-MCNC: 0.7 MG/DL — SIGNIFICANT CHANGE UP (ref 0.2–1.2)
BUN SERPL-MCNC: 15 MG/DL — SIGNIFICANT CHANGE UP (ref 10–20)
CALCIUM SERPL-MCNC: 8.5 MG/DL — SIGNIFICANT CHANGE UP (ref 8.4–10.5)
CHLORIDE SERPL-SCNC: 104 MMOL/L — SIGNIFICANT CHANGE UP (ref 98–110)
CO2 SERPL-SCNC: 25 MMOL/L — SIGNIFICANT CHANGE UP (ref 17–32)
CREAT SERPL-MCNC: 0.9 MG/DL — SIGNIFICANT CHANGE UP (ref 0.7–1.5)
EGFR: 90 ML/MIN/1.73M2 — SIGNIFICANT CHANGE UP
GLUCOSE SERPL-MCNC: 81 MG/DL — SIGNIFICANT CHANGE UP (ref 70–99)
HCT VFR BLD CALC: 27.7 % — LOW (ref 42–52)
HGB BLD-MCNC: 8.3 G/DL — LOW (ref 14–18)
MAGNESIUM SERPL-MCNC: 2 MG/DL — SIGNIFICANT CHANGE UP (ref 1.8–2.4)
MCHC RBC-ENTMCNC: 30 G/DL — LOW (ref 32–37)
MCHC RBC-ENTMCNC: 30.5 PG — SIGNIFICANT CHANGE UP (ref 27–31)
MCV RBC AUTO: 101.8 FL — HIGH (ref 80–94)
NRBC BLD AUTO-RTO: 0 /100 WBCS — SIGNIFICANT CHANGE UP (ref 0–0)
PLATELET # BLD AUTO: 235 K/UL — SIGNIFICANT CHANGE UP (ref 130–400)
PMV BLD: 10.2 FL — SIGNIFICANT CHANGE UP (ref 7.4–10.4)
POTASSIUM SERPL-MCNC: 4.2 MMOL/L — SIGNIFICANT CHANGE UP (ref 3.5–5)
POTASSIUM SERPL-SCNC: 4.2 MMOL/L — SIGNIFICANT CHANGE UP (ref 3.5–5)
PROT SERPL-MCNC: 5.4 G/DL — LOW (ref 6–8)
RBC # BLD: 2.72 M/UL — LOW (ref 4.7–6.1)
RBC # FLD: 14.6 % — HIGH (ref 11.5–14.5)
SODIUM SERPL-SCNC: 139 MMOL/L — SIGNIFICANT CHANGE UP (ref 135–146)
WBC # BLD: 5.17 K/UL — SIGNIFICANT CHANGE UP (ref 4.8–10.8)
WBC # FLD AUTO: 5.17 K/UL — SIGNIFICANT CHANGE UP (ref 4.8–10.8)

## 2025-02-21 PROCEDURE — 99233 SBSQ HOSP IP/OBS HIGH 50: CPT

## 2025-02-21 RX ORDER — MAGNESIUM SULFATE 500 MG/ML
2 SYRINGE (ML) INJECTION ONCE
Refills: 0 | Status: COMPLETED | OUTPATIENT
Start: 2025-02-21 | End: 2025-02-21

## 2025-02-21 RX ADMIN — APIXABAN 5 MILLIGRAM(S): 2.5 TABLET, FILM COATED ORAL at 06:03

## 2025-02-21 RX ADMIN — APIXABAN 5 MILLIGRAM(S): 2.5 TABLET, FILM COATED ORAL at 17:31

## 2025-02-21 RX ADMIN — DIGOXIN 125 MICROGRAM(S): 250 TABLET ORAL at 06:02

## 2025-02-21 RX ADMIN — METOPROLOL SUCCINATE 75 MILLIGRAM(S): 50 TABLET, EXTENDED RELEASE ORAL at 21:34

## 2025-02-21 RX ADMIN — Medication 25 GRAM(S): at 09:03

## 2025-02-21 RX ADMIN — Medication 400 MILLIGRAM(S): at 17:31

## 2025-02-21 RX ADMIN — MIDODRINE HYDROCHLORIDE 15 MILLIGRAM(S): 5 TABLET ORAL at 21:34

## 2025-02-21 RX ADMIN — METOPROLOL SUCCINATE 75 MILLIGRAM(S): 50 TABLET, EXTENDED RELEASE ORAL at 12:32

## 2025-02-21 RX ADMIN — EZETIMIBE 10 MILLIGRAM(S): 10 TABLET ORAL at 12:32

## 2025-02-21 RX ADMIN — MIDODRINE HYDROCHLORIDE 15 MILLIGRAM(S): 5 TABLET ORAL at 06:02

## 2025-02-21 RX ADMIN — MIDODRINE HYDROCHLORIDE 15 MILLIGRAM(S): 5 TABLET ORAL at 14:49

## 2025-02-21 RX ADMIN — METOPROLOL SUCCINATE 75 MILLIGRAM(S): 50 TABLET, EXTENDED RELEASE ORAL at 04:09

## 2025-02-21 RX ADMIN — Medication 1 APPLICATION(S): at 12:33

## 2025-02-21 RX ADMIN — Medication 400 MILLIGRAM(S): at 12:32

## 2025-02-21 RX ADMIN — Medication 40 MILLIGRAM(S): at 12:32

## 2025-02-21 RX ADMIN — Medication 400 MILLIGRAM(S): at 08:29

## 2025-02-21 NOTE — PROGRESS NOTE ADULT - SUBJECTIVE AND OBJECTIVE BOX
ALLIE TORRE 74y Male  MRN#: [id number]   Hospital Day: 17d    HPI:    Patient is a 74-year-old male with past medical history of hypertension, dyslipidemia, mitral valve regurg, A-fib on Coumadin,HFrEF with an EF of 45% in december , and status post AICD presenting for weakness. Patient endorses persistent, complaining generalized weakness, fatigue, anorexia and poor p.o. intake for the past 5-6 weeks. Patient also reports a significant weight loss. Patient  states he had recent blood work which showed worsening kidney function, creatinine was 3.1 in  january up from a baseline of 1,2 in december and currently creatinine in 5.3.         · BP   65 mm Hg/45 mm Hg  · Heart Rate  86 /min  · Temp (C)  36.9 Degrees C oral   · SpO2 (%)  98 % on room air      (04 Feb 2025 13:48)      SUBJECTIVE  Patient is a 74y old Male who presents with a chief complaint of weakness (16 Feb 2025 11:59)  Currently admitted to medicine with the primary diagnosis of Hypotension      INTERVAL HPI AND OVERNIGHT EVENTS:  Patient was examined and seen at bedside. No reported events overnight.    OBJECTIVE  PAST MEDICAL & SURGICAL HISTORY    ALLERGIES:  No Known Allergies    MEDICATIONS:  STANDING MEDICATIONS  apixaban 5 milliGRAM(s) Oral two times a day  chlorhexidine 2% Cloths 1 Application(s) Topical daily  digoxin     Tablet 125 MICROGram(s) Oral daily  ezetimibe 10 milliGRAM(s) Oral daily  magnesium oxide 400 milliGRAM(s) Oral three times a day with meals  metoprolol tartrate 75 milliGRAM(s) Oral every 8 hours  midodrine 15 milliGRAM(s) Oral every 8 hours  pantoprazole    Tablet 40 milliGRAM(s) Oral daily    PRN MEDICATIONS  lidocaine 5% Ointment 1 Application(s) Topical four times a day PRN      VITAL SIGNS: Last 24 Hours  T(C): 36.8 (21 Feb 2025 04:00), Max: 37.2 (20 Feb 2025 21:02)  T(F): 98.3 (21 Feb 2025 04:00), Max: 98.9 (20 Feb 2025 21:02)  HR: 82 (21 Feb 2025 04:00) (82 - 86)  BP: 114/72 (21 Feb 2025 04:00) (107/62 - 114/72)  BP(mean): 86 (21 Feb 2025 04:00) (77 - 86)  RR: 18 (21 Feb 2025 04:00) (18 - 18)  SpO2: 97% (21 Feb 2025 04:00) (97% - 97%)    LABS:                        8.0    5.06  )-----------( 243      ( 20 Feb 2025 05:42 )             26.2     02-20    140  |  106  |  17  ----------------------------<  80  4.4   |  26  |  0.9    Ca    8.5      20 Feb 2025 05:42    TPro  5.2[L]  /  Alb  3.4[L]  /  TBili  0.7  /  DBili  x   /  AST  14  /  ALT  30  /  AlkPhos  63  02-20      Urinalysis Basic - ( 20 Feb 2025 05:42 )    Color: x / Appearance: x / SG: x / pH: x  Gluc: 80 mg/dL / Ketone: x  / Bili: x / Urobili: x   Blood: x / Protein: x / Nitrite: x   Leuk Esterase: x / RBC: x / WBC x   Sq Epi: x / Non Sq Epi: x / Bacteria: x      PHYSICAL EXAM:  CONSTITUTIONAL: No acute distress  HEAD: Atraumatic, normocephalic  EYES: EOM intact, PERRLA, conjunctiva and sclera clear  ENT: Supple, no masses, no thyromegaly, no bruits, no JVD; moist mucous membranes  PULMONARY: Clear to auscultation bilaterally; no wheezes, rales, or rhonchi  CARDIOVASCULAR: Regular rate and rhythm; no murmurs, rubs, or gallops  GASTROINTESTINAL: Soft, non-tender, non-distended; bowel sounds present  MUSCULOSKELETAL: 2+ peripheral pulses; no clubbing, no cyanosis  NEUROLOGY: non-focal  SKIN: No rashes or lesions; warm and dry    ASSESSMENT & PLAN    74-year-old male with past medical history of hypertension, dyslipidemia, mitral valve regurg, A-fib on Coumadin,HFrEF with an EF of 45% in december , and status post AICD presenting for weakness.      2/21  - f/u repeat orthostats  - ambulatory HR  -------    # Hypovolemic Shock due to polypharmacy   # A-fib with RVR   # HFrEF 45%/ h/o AICD  - currently HDS, afebrile and sating well on RA  -  off pressors now  -  c/w midodrine 15 q8, wean as tolerated   - TSH low, Ft4 normal, t3 low, euthyroid SS, discussed with endocrine yesterday, no meds needed and outpt f/u for repeat TFTs in 4-6 weeks   - TTE: EF 45-50% with moderate TR only  - case discussed with pt cardio Dr Cunningham ,  echo unchanged since Nov 2023, cath was ok a few years ago.   - consulted by cardiology , recommendations noted  - metopr now 75 q8  - holding entresto 2/15  - Aldactone and Farxiga can be started outpatient.   - Replete Mg. Monitor electrolytes. Keep K > 4 and Mg > 2.2.  - recalling EP--> started Dig; patient continues to not be well controlled  - 2/21 f/u repeat orthostats, ambulatory HR    # Orthostatic Hypotension - improved  - check orthostatic VS Q shift   - ACE wrap on LE, abdominal binder   - fall precautions   - 2/21 f/u repeat orthostats    #Anemia  -gave 1uprbc 2/15  -anemia of chronic disease  -pt with hx of sigmoidectomy, pt is refusing a repeat CT abd/pelvis or to see GI at this time    # MARIANNE /HAGMA/ h/o nephrolithiasis   - resolved  - d/c Calcium Acetate   - monitor BUN/cr and urine output   - keep MAP above 65  -encourage po hydration   - CT noted: No acute abnormality in the abdomen and pelvis. No hydronephrosis, Left-sided nephrolithiasis.  -nephro signed off     # Transaminitis   - no RUQ pain, monitor LFTs   - avoid hepatotoxic meds, BP control and trend LFTs  -improving     # Gi prophylaxis   - on PPIs

## 2025-02-21 NOTE — PROGRESS NOTE ADULT - ASSESSMENT
74-year-old male with past medical history of hypertension, dyslipidemia, mitral valve regurg, A-fib on Coumadin,HFrEF with an EF of 45% in december , and status post AICD presenting for weakness.      GENERAL: NAD, well-groomed, well-developed  PSYCH: no agitation, baseline mentation  NERVOUS SYSTEM:  Alert & Oriented X3   PULMONARY: Clear to percussion bilaterally; No rales, rhonchi, wheezing, or rubs  CARDIOVASCULAR: Regular rate and rhythm; No murmurs, rubs, or gallops  GI: , Nondistended; Bowel sounds present  EXTREMITIES:   No clubbing, cyanosis, or edema  SKIN: No rashes or lesions    # Hypovolemic Shock due to polypharmacy   # Orthostatic Hypotension - improved  # Anemia  # A-fib with RVR   # HFrEF 45%/ h/o AICD  # MARIANNE /HAGMA/ h/o nephrolithiasis   # Transaminitis     Plan     - currently HDS, afebrile and sating well on RA  -  c/w midodrine 15 q8, wean as tolerated   - TSH low, Ft4 normal, t3 low, euthyroid SS, discussed with endocrine yesterday, no meds needed and outpt f/u for repeat TFTs in 4-6 weeks   - TTE: EF 45-50% with moderate TR only  - case discussed with pt cardio Dr Cunningham ,  echo unchanged since Nov 2023, cath was ok a few years ago.   - consulted by cardiology , recommendations noted  - continue metop tartrate 75 mg q8;   HR goal ~70-80s bpm; HOWEVER, on ambulation goes up to 150s  - continue digoxin   - holding entresto 2/15  - Aldactone and Farxiga can be started outpatient.   - Replete Mg. Monitor electrolytes. Keep K > 4 and Mg > 2.2.  - recalling EP--> started Dig; patient continues to not be well controlled.      - check orthostatic VS Q shift   - ACE wrap on LE, abdominal binder   - fall precautions       -gave 1uprbc 2/15  -anemia of chronic disease  -pt with hx of sigmoidectomy, pt is refusing a repeat CT abd/pelvis or to see GI at this time      - resolved  - d/c Calcium Acetate   - monitor BUN/cr and urine output   - keep MAP above 65  -encourage po hydration   - CT noted: No acute abnormality in the abdomen and pelvis. No hydronephrosis, Left-sided nephrolithiasis.  -nephro signed off         - no RUQ pain, monitor LFTs   - avoid hepatotoxic meds, BP control and trend LFTs  -improving     # Gi prophylaxis   - on PPIs    #Progress Note Handoff  Pending (specify):  EP recall for uncontrolled HR .  74-year-old male with past medical history of hypertension, dyslipidemia, mitral valve regurg, A-fib on Coumadin,HFrEF with an EF of 45% in december , and status post AICD presenting for weakness.      GENERAL: NAD, well-groomed, well-developed  PSYCH: no agitation, baseline mentation  NERVOUS SYSTEM:  Alert & Oriented X3   PULMONARY: Clear to percussion bilaterally; No rales, rhonchi, wheezing, or rubs  CARDIOVASCULAR: Regular rate and rhythm; No murmurs, rubs, or gallops  GI: Nondistended; Bowel sounds present  EXTREMITIES:   No edema      # Hypovolemic Shock suspecting due to anemia   # Orthostatic Hypotension - HR elevated on ambulation  # Anemia- INR elevated on admission. HGB 11-> 8.3 ( stable)   # A-fib with RVR - controlled at rest.   # HFrEF 45%/ h/o AICD- TTE: EF 45-50% with moderate TR only  # MARIANNE /HAGMA/ h/o nephrolithiasis   # Transaminitis   # Euthyroid SS    Plan   - orthostatic today pos due to increase HR  - Give one unit of PRBC for symptomatic anemia  - Get CT abd pel noncon  - GI consult   - continue metoprolol 75 q8 ( patient on 300 mg at home-> 200 mg in am and 100 in pm)  - patient is on digoxin ( new medication)  -  c/w midodrine 15 q8, wean as tolerated   - Holding on GDMT due to hypotension-> Entresto, Aldactone, farxiga  - TSH low, Ft4 normal, t3 low, euthyroid SS, discussed with endocrine yesterday, no meds needed and outpt f/u for repeat TFTs in 4-6 weeks     # Gi prophylaxis   - on PPIs    #Progress Note Handoff  Pending (specify): Anemia workup

## 2025-02-21 NOTE — PROGRESS NOTE ADULT - SUBJECTIVE AND OBJECTIVE BOX
SUBJECTIVE:    Patient is a 74y old Male who presents with a chief complaint of weakness (16 Feb 2025 11:59)      Today is hospital day 17d.     Overnight Events:     No acute overnight events. No active complaints    PAST MEDICAL & SURGICAL HISTORY        ALLERGIES:  No Known Allergies    MEDICATIONS:  STANDING MEDICATIONS  apixaban 5 milliGRAM(s) Oral two times a day  chlorhexidine 2% Cloths 1 Application(s) Topical daily  digoxin     Tablet 125 MICROGram(s) Oral daily  ezetimibe 10 milliGRAM(s) Oral daily  magnesium oxide 400 milliGRAM(s) Oral three times a day with meals  magnesium sulfate  IVPB 2 Gram(s) IV Intermittent once  metoprolol tartrate 75 milliGRAM(s) Oral every 8 hours  midodrine 15 milliGRAM(s) Oral every 8 hours  pantoprazole    Tablet 40 milliGRAM(s) Oral daily    PRN MEDICATIONS  lidocaine 5% Ointment 1 Application(s) Topical four times a day PRN    VITALS:   ICU Vital Signs Last 24 Hrs  T(C): 36.8 (21 Feb 2025 04:00), Max: 37.2 (20 Feb 2025 21:02)  T(F): 98.3 (21 Feb 2025 04:00), Max: 98.9 (20 Feb 2025 21:02)  HR: 82 (21 Feb 2025 04:00) (82 - 86)  BP: 114/72 (21 Feb 2025 04:00) (107/62 - 114/72)  BP(mean): 86 (21 Feb 2025 04:00) (77 - 86)  ABP: --  ABP(mean): --  RR: 18 (21 Feb 2025 04:00) (18 - 18)  SpO2: 97% (21 Feb 2025 04:00) (97% - 97%)        LABS:                        8.0    5.06  )-----------( 243      ( 20 Feb 2025 05:42 )             26.2     02-20    140  |  106  |  17  ----------------------------<  80  4.4   |  26  |  0.9    Ca    8.5      20 Feb 2025 05:42    TPro  5.2[L]  /  Alb  3.4[L]  /  TBili  0.7  /  DBili  x   /  AST  14  /  ALT  30  /  AlkPhos  63  02-20      Urinalysis Basic - ( 20 Feb 2025 05:42 )    Color: x / Appearance: x / SG: x / pH: x  Gluc: 80 mg/dL / Ketone: x  / Bili: x / Urobili: x   Blood: x / Protein: x / Nitrite: x   Leuk Esterase: x / RBC: x / WBC x   Sq Epi: x / Non Sq Epi: x / Bacteria: x                  RADIOLOGY:      Lines:  Central line:              Date placed:             Indication:   Intravenous Access:   NG tube:   Art Catheter:       Diagnositic orders     magnesium sulfate  IVPB (02-21-25 @ 07:39) [Active]  Magnesium (02-21-25 @ 07:38) [Ordered]  Comprehensive Metabolic Panel (02-21-25 @ 07:38) [Ordered]  Complete Blood Count (02-21-25 @ 07:38) [Ordered]  Provider to RN (02-20-25 @ 14:28) [Active]  metoprolol tartrate (02-20-25 @ 14:28) [Active]  Provider to RN (02-20-25 @ 08:55) [Active]

## 2025-02-22 LAB
ALBUMIN SERPL ELPH-MCNC: 3.3 G/DL — LOW (ref 3.5–5.2)
ALP SERPL-CCNC: 56 U/L — SIGNIFICANT CHANGE UP (ref 30–115)
ALT FLD-CCNC: 23 U/L — SIGNIFICANT CHANGE UP (ref 0–41)
ANION GAP SERPL CALC-SCNC: 9 MMOL/L — SIGNIFICANT CHANGE UP (ref 7–14)
APTT BLD: 33.5 SEC — SIGNIFICANT CHANGE UP (ref 27–39.2)
AST SERPL-CCNC: 13 U/L — SIGNIFICANT CHANGE UP (ref 0–41)
BASOPHILS # BLD AUTO: 0.06 K/UL — SIGNIFICANT CHANGE UP (ref 0–0.2)
BASOPHILS NFR BLD AUTO: 1.2 % — HIGH (ref 0–1)
BILIRUB SERPL-MCNC: 0.8 MG/DL — SIGNIFICANT CHANGE UP (ref 0.2–1.2)
BUN SERPL-MCNC: 15 MG/DL — SIGNIFICANT CHANGE UP (ref 10–20)
CALCIUM SERPL-MCNC: 8.7 MG/DL — SIGNIFICANT CHANGE UP (ref 8.4–10.5)
CHLORIDE SERPL-SCNC: 105 MMOL/L — SIGNIFICANT CHANGE UP (ref 98–110)
CO2 SERPL-SCNC: 25 MMOL/L — SIGNIFICANT CHANGE UP (ref 17–32)
CREAT SERPL-MCNC: 0.9 MG/DL — SIGNIFICANT CHANGE UP (ref 0.7–1.5)
DIGOXIN SERPL-MCNC: 0.4 NG/ML — LOW (ref 0.8–2)
EGFR: 90 ML/MIN/1.73M2 — SIGNIFICANT CHANGE UP
EOSINOPHIL # BLD AUTO: 0.25 K/UL — SIGNIFICANT CHANGE UP (ref 0–0.7)
EOSINOPHIL NFR BLD AUTO: 4.9 % — SIGNIFICANT CHANGE UP (ref 0–8)
GLUCOSE SERPL-MCNC: 79 MG/DL — SIGNIFICANT CHANGE UP (ref 70–99)
HCT VFR BLD CALC: 27.9 % — LOW (ref 42–52)
HGB BLD-MCNC: 8.7 G/DL — LOW (ref 14–18)
IMM GRANULOCYTES NFR BLD AUTO: 0.2 % — SIGNIFICANT CHANGE UP (ref 0.1–0.3)
INR BLD: 1.22 RATIO — SIGNIFICANT CHANGE UP (ref 0.65–1.3)
LYMPHOCYTES # BLD AUTO: 0.94 K/UL — LOW (ref 1.2–3.4)
LYMPHOCYTES # BLD AUTO: 18.5 % — LOW (ref 20.5–51.1)
MAGNESIUM SERPL-MCNC: 1.8 MG/DL — SIGNIFICANT CHANGE UP (ref 1.8–2.4)
MCHC RBC-ENTMCNC: 30.5 PG — SIGNIFICANT CHANGE UP (ref 27–31)
MCHC RBC-ENTMCNC: 31.2 G/DL — LOW (ref 32–37)
MCV RBC AUTO: 97.9 FL — HIGH (ref 80–94)
MONOCYTES # BLD AUTO: 0.47 K/UL — SIGNIFICANT CHANGE UP (ref 0.1–0.6)
MONOCYTES NFR BLD AUTO: 9.3 % — SIGNIFICANT CHANGE UP (ref 1.7–9.3)
NEUTROPHILS # BLD AUTO: 3.35 K/UL — SIGNIFICANT CHANGE UP (ref 1.4–6.5)
NEUTROPHILS NFR BLD AUTO: 65.9 % — SIGNIFICANT CHANGE UP (ref 42.2–75.2)
NRBC BLD AUTO-RTO: 0 /100 WBCS — SIGNIFICANT CHANGE UP (ref 0–0)
PHOSPHATE SERPL-MCNC: 3.3 MG/DL — SIGNIFICANT CHANGE UP (ref 2.1–4.9)
PLATELET # BLD AUTO: 255 K/UL — SIGNIFICANT CHANGE UP (ref 130–400)
PMV BLD: 10.4 FL — SIGNIFICANT CHANGE UP (ref 7.4–10.4)
POTASSIUM SERPL-MCNC: 4.5 MMOL/L — SIGNIFICANT CHANGE UP (ref 3.5–5)
POTASSIUM SERPL-SCNC: 4.5 MMOL/L — SIGNIFICANT CHANGE UP (ref 3.5–5)
PROT SERPL-MCNC: 5.2 G/DL — LOW (ref 6–8)
PROTHROM AB SERPL-ACNC: 14.5 SEC — HIGH (ref 9.95–12.87)
RBC # BLD: 2.85 M/UL — LOW (ref 4.7–6.1)
RBC # FLD: 15.1 % — HIGH (ref 11.5–14.5)
SODIUM SERPL-SCNC: 139 MMOL/L — SIGNIFICANT CHANGE UP (ref 135–146)
WBC # BLD: 5.08 K/UL — SIGNIFICANT CHANGE UP (ref 4.8–10.8)
WBC # FLD AUTO: 5.08 K/UL — SIGNIFICANT CHANGE UP (ref 4.8–10.8)

## 2025-02-22 PROCEDURE — 74176 CT ABD & PELVIS W/O CONTRAST: CPT | Mod: 26

## 2025-02-22 PROCEDURE — 99232 SBSQ HOSP IP/OBS MODERATE 35: CPT

## 2025-02-22 RX ADMIN — METOPROLOL SUCCINATE 75 MILLIGRAM(S): 50 TABLET, EXTENDED RELEASE ORAL at 11:57

## 2025-02-22 RX ADMIN — DIGOXIN 125 MICROGRAM(S): 250 TABLET ORAL at 06:24

## 2025-02-22 RX ADMIN — MIDODRINE HYDROCHLORIDE 15 MILLIGRAM(S): 5 TABLET ORAL at 22:04

## 2025-02-22 RX ADMIN — Medication 1 APPLICATION(S): at 11:55

## 2025-02-22 RX ADMIN — METOPROLOL SUCCINATE 75 MILLIGRAM(S): 50 TABLET, EXTENDED RELEASE ORAL at 20:45

## 2025-02-22 RX ADMIN — Medication 400 MILLIGRAM(S): at 08:19

## 2025-02-22 RX ADMIN — MIDODRINE HYDROCHLORIDE 15 MILLIGRAM(S): 5 TABLET ORAL at 14:01

## 2025-02-22 RX ADMIN — APIXABAN 5 MILLIGRAM(S): 2.5 TABLET, FILM COATED ORAL at 17:49

## 2025-02-22 RX ADMIN — Medication 400 MILLIGRAM(S): at 11:56

## 2025-02-22 RX ADMIN — METOPROLOL SUCCINATE 75 MILLIGRAM(S): 50 TABLET, EXTENDED RELEASE ORAL at 04:28

## 2025-02-22 RX ADMIN — EZETIMIBE 10 MILLIGRAM(S): 10 TABLET ORAL at 11:56

## 2025-02-22 RX ADMIN — MIDODRINE HYDROCHLORIDE 15 MILLIGRAM(S): 5 TABLET ORAL at 06:24

## 2025-02-22 RX ADMIN — APIXABAN 5 MILLIGRAM(S): 2.5 TABLET, FILM COATED ORAL at 06:25

## 2025-02-22 RX ADMIN — Medication 400 MILLIGRAM(S): at 17:49

## 2025-02-22 RX ADMIN — Medication 40 MILLIGRAM(S): at 11:57

## 2025-02-22 NOTE — PROGRESS NOTE ADULT - SUBJECTIVE AND OBJECTIVE BOX
SUBJECTIVE:    Patient is a 74y old Male who presents with a chief complaint of weakness (16 Feb 2025 11:59)    Currently admitted to medicine with the primary diagnosis of Hypotension       Today is hospital day 18d. This morning he is resting comfortably in bed and reports no new issues or overnight events.     PAST MEDICAL & SURGICAL HISTORY    SOCIAL HISTORY:  Negative for smoking/alcohol/drug use.     ALLERGIES:  No Known Allergies    MEDICATIONS:  STANDING MEDICATIONS  apixaban 5 milliGRAM(s) Oral two times a day  chlorhexidine 2% Cloths 1 Application(s) Topical daily  digoxin     Tablet 125 MICROGram(s) Oral daily  ezetimibe 10 milliGRAM(s) Oral daily  magnesium oxide 400 milliGRAM(s) Oral three times a day with meals  metoprolol tartrate 75 milliGRAM(s) Oral every 8 hours  midodrine 15 milliGRAM(s) Oral every 8 hours  pantoprazole    Tablet 40 milliGRAM(s) Oral daily    PRN MEDICATIONS  lidocaine 5% Ointment 1 Application(s) Topical four times a day PRN    VITALS:   T(F): 98.2  HR: 75  BP: 116/74  RR: 18  SpO2: 97%    LABS:                        8.7    5.08  )-----------( 255      ( 22 Feb 2025 04:50 )             27.9     02-22    139  |  105  |  15  ----------------------------<  79  4.5   |  25  |  0.9    Ca    8.7      22 Feb 2025 04:50  Phos  3.3     02-22  Mg     1.8     02-22    TPro  5.2[L]  /  Alb  3.3[L]  /  TBili  0.8  /  DBili  x   /  AST  13  /  ALT  23  /  AlkPhos  56  02-22    PT/INR - ( 22 Feb 2025 04:50 )   PT: 14.50 sec;   INR: 1.22 ratio         PTT - ( 22 Feb 2025 04:50 )  PTT:33.5 sec  Urinalysis Basic - ( 22 Feb 2025 04:50 )    Color: x / Appearance: x / SG: x / pH: x  Gluc: 79 mg/dL / Ketone: x  / Bili: x / Urobili: x   Blood: x / Protein: x / Nitrite: x   Leuk Esterase: x / RBC: x / WBC x   Sq Epi: x / Non Sq Epi: x / Bacteria: x                RADIOLOGY:    < from: CT Abdomen and Pelvis No Cont (02.22.25 @ 03:02) >    IMPRESSION:  1.  No evidence of retroperitoneal hemorrhage as clinically queried.  2.  Interval development ofa mild focal noncomplicated acute left mid   colon diverticulitis.  3.  Interval development of tiny right pleural effusion.  4.  Additional findings not significantly changed since 2/4/2025.        < end of copied text >      PHYSICAL EXAM:    GENERAL: NAD, well-groomed, well-developed  PSYCH: no agitation, baseline mentation  NERVOUS SYSTEM:  Alert & Oriented X3   PULMONARY: Clear to percussion bilaterally; No rales, rhonchi, wheezing, or rubs  CARDIOVASCULAR: Regular rate and rhythm; No murmurs, rubs, or gallops  GI: Nondistended; Bowel sounds present  EXTREMITIES:   No edema

## 2025-02-22 NOTE — PROGRESS NOTE ADULT - ASSESSMENT
74-year-old male with past medical history of hypertension, dyslipidemia, mitral valve regurg, A-fib on Coumadin,HFrEF with an EF of 45% in december , and status post AICD presenting for weakness.      # Hypovolemic Shock suspecting due to anemia   # Orthostatic Hypotension - HR elevated on ambulation  # Anemia- INR elevated on admission. HGB 11-> 8.3 ( stable)   # A-fib with RVR - controlled at rest.   # HFrEF 45%/ h/o AICD- TTE: EF 45-50% with moderate TR only  # MARIANNE /HAGMA/ h/o nephrolithiasis   # Transaminitis   # Euthyroid SS    Plan   - orthostatic yesterday pos due to increase HR  - s/p 1 pRBC yesterday,  for symptomatic anemia, Hb 8.3 > 8.7   - Get CT abd pel noncon negative for retroperitoneal bleed, iron studies normal   - no over sings of bleeding source   - c/w with eliquis for now   - continue metoprolol 75 q8 ( patient on 300 mg at home-> 200 mg in am and 100 in pm)  - patient is on digoxin ( new medication)  -  c/w midodrine 15 q8, wean as tolerated   - Holding on GDMT due to hypotension-> Entresto, Aldactone, farxiga  - TSH low, Ft4 normal, t3 low, euthyroid SS, team discussed with endocrine yesterday, no meds needed and outpt f/u for repeat TFTs in 4-6 weeks     # Gi prophylaxis   - on PPIs    #Progress Note Handoff  Pending (specify): Anemia workup , monitor BP, re introducing GDMT when bp allows

## 2025-02-23 LAB
ALBUMIN SERPL ELPH-MCNC: 3.4 G/DL — LOW (ref 3.5–5.2)
ALP SERPL-CCNC: 65 U/L — SIGNIFICANT CHANGE UP (ref 30–115)
ALT FLD-CCNC: 21 U/L — SIGNIFICANT CHANGE UP (ref 0–41)
ANION GAP SERPL CALC-SCNC: 10 MMOL/L — SIGNIFICANT CHANGE UP (ref 7–14)
AST SERPL-CCNC: 11 U/L — SIGNIFICANT CHANGE UP (ref 0–41)
BASOPHILS # BLD AUTO: 0.04 K/UL — SIGNIFICANT CHANGE UP (ref 0–0.2)
BASOPHILS NFR BLD AUTO: 0.9 % — SIGNIFICANT CHANGE UP (ref 0–1)
BILIRUB SERPL-MCNC: 0.8 MG/DL — SIGNIFICANT CHANGE UP (ref 0.2–1.2)
BUN SERPL-MCNC: 16 MG/DL — SIGNIFICANT CHANGE UP (ref 10–20)
CALCIUM SERPL-MCNC: 8.5 MG/DL — SIGNIFICANT CHANGE UP (ref 8.4–10.5)
CHLORIDE SERPL-SCNC: 106 MMOL/L — SIGNIFICANT CHANGE UP (ref 98–110)
CO2 SERPL-SCNC: 25 MMOL/L — SIGNIFICANT CHANGE UP (ref 17–32)
CREAT SERPL-MCNC: 0.9 MG/DL — SIGNIFICANT CHANGE UP (ref 0.7–1.5)
EGFR: 90 ML/MIN/1.73M2 — SIGNIFICANT CHANGE UP
EOSINOPHIL # BLD AUTO: 0.27 K/UL — SIGNIFICANT CHANGE UP (ref 0–0.7)
EOSINOPHIL NFR BLD AUTO: 5.7 % — SIGNIFICANT CHANGE UP (ref 0–8)
GLUCOSE SERPL-MCNC: 87 MG/DL — SIGNIFICANT CHANGE UP (ref 70–99)
HCT VFR BLD CALC: 28.4 % — LOW (ref 42–52)
HGB BLD-MCNC: 8.7 G/DL — LOW (ref 14–18)
IMM GRANULOCYTES NFR BLD AUTO: 0.4 % — HIGH (ref 0.1–0.3)
LYMPHOCYTES # BLD AUTO: 1.11 K/UL — LOW (ref 1.2–3.4)
LYMPHOCYTES # BLD AUTO: 23.6 % — SIGNIFICANT CHANGE UP (ref 20.5–51.1)
MAGNESIUM SERPL-MCNC: 1.8 MG/DL — SIGNIFICANT CHANGE UP (ref 1.8–2.4)
MCHC RBC-ENTMCNC: 30.2 PG — SIGNIFICANT CHANGE UP (ref 27–31)
MCHC RBC-ENTMCNC: 30.6 G/DL — LOW (ref 32–37)
MCV RBC AUTO: 98.6 FL — HIGH (ref 80–94)
MONOCYTES # BLD AUTO: 0.38 K/UL — SIGNIFICANT CHANGE UP (ref 0.1–0.6)
MONOCYTES NFR BLD AUTO: 8.1 % — SIGNIFICANT CHANGE UP (ref 1.7–9.3)
NEUTROPHILS # BLD AUTO: 2.88 K/UL — SIGNIFICANT CHANGE UP (ref 1.4–6.5)
NEUTROPHILS NFR BLD AUTO: 61.3 % — SIGNIFICANT CHANGE UP (ref 42.2–75.2)
NRBC BLD AUTO-RTO: 0 /100 WBCS — SIGNIFICANT CHANGE UP (ref 0–0)
PHOSPHATE SERPL-MCNC: 3 MG/DL — SIGNIFICANT CHANGE UP (ref 2.1–4.9)
PLATELET # BLD AUTO: 265 K/UL — SIGNIFICANT CHANGE UP (ref 130–400)
PMV BLD: 10.1 FL — SIGNIFICANT CHANGE UP (ref 7.4–10.4)
POTASSIUM SERPL-MCNC: 4.3 MMOL/L — SIGNIFICANT CHANGE UP (ref 3.5–5)
POTASSIUM SERPL-SCNC: 4.3 MMOL/L — SIGNIFICANT CHANGE UP (ref 3.5–5)
PROT SERPL-MCNC: 5.2 G/DL — LOW (ref 6–8)
RBC # BLD: 2.88 M/UL — LOW (ref 4.7–6.1)
RBC # FLD: 14.9 % — HIGH (ref 11.5–14.5)
SODIUM SERPL-SCNC: 141 MMOL/L — SIGNIFICANT CHANGE UP (ref 135–146)
WBC # BLD: 4.7 K/UL — LOW (ref 4.8–10.8)
WBC # FLD AUTO: 4.7 K/UL — LOW (ref 4.8–10.8)

## 2025-02-23 PROCEDURE — 99232 SBSQ HOSP IP/OBS MODERATE 35: CPT

## 2025-02-23 RX ORDER — DAPAGLIFLOZIN 5 MG/1
10 TABLET, FILM COATED ORAL DAILY
Refills: 0 | Status: DISCONTINUED | OUTPATIENT
Start: 2025-02-23 | End: 2025-02-28

## 2025-02-23 RX ADMIN — DAPAGLIFLOZIN 10 MILLIGRAM(S): 5 TABLET, FILM COATED ORAL at 13:36

## 2025-02-23 RX ADMIN — METOPROLOL SUCCINATE 75 MILLIGRAM(S): 50 TABLET, EXTENDED RELEASE ORAL at 21:32

## 2025-02-23 RX ADMIN — APIXABAN 5 MILLIGRAM(S): 2.5 TABLET, FILM COATED ORAL at 06:38

## 2025-02-23 RX ADMIN — APIXABAN 5 MILLIGRAM(S): 2.5 TABLET, FILM COATED ORAL at 18:21

## 2025-02-23 RX ADMIN — MIDODRINE HYDROCHLORIDE 15 MILLIGRAM(S): 5 TABLET ORAL at 06:37

## 2025-02-23 RX ADMIN — Medication 400 MILLIGRAM(S): at 18:21

## 2025-02-23 RX ADMIN — Medication 400 MILLIGRAM(S): at 07:58

## 2025-02-23 RX ADMIN — MIDODRINE HYDROCHLORIDE 15 MILLIGRAM(S): 5 TABLET ORAL at 13:37

## 2025-02-23 RX ADMIN — EZETIMIBE 10 MILLIGRAM(S): 10 TABLET ORAL at 11:43

## 2025-02-23 RX ADMIN — METOPROLOL SUCCINATE 75 MILLIGRAM(S): 50 TABLET, EXTENDED RELEASE ORAL at 11:43

## 2025-02-23 RX ADMIN — Medication 1 APPLICATION(S): at 11:43

## 2025-02-23 RX ADMIN — DIGOXIN 125 MICROGRAM(S): 250 TABLET ORAL at 06:37

## 2025-02-23 RX ADMIN — Medication 40 MILLIGRAM(S): at 11:43

## 2025-02-23 RX ADMIN — METOPROLOL SUCCINATE 75 MILLIGRAM(S): 50 TABLET, EXTENDED RELEASE ORAL at 04:31

## 2025-02-23 RX ADMIN — MIDODRINE HYDROCHLORIDE 15 MILLIGRAM(S): 5 TABLET ORAL at 21:33

## 2025-02-23 RX ADMIN — Medication 400 MILLIGRAM(S): at 11:43

## 2025-02-23 NOTE — PROGRESS NOTE ADULT - ASSESSMENT
74-year-old male with past medical history of hypertension, dyslipidemia, mitral valve regurg, A-fib on Coumadin,HFrEF with an EF of 45% in december , and status post AICD presenting for weakness.      # Hypovolemic Shock suspecting due to anemia   # Orthostatic Hypotension - HR elevated on ambulation  # Anemia- INR elevated on admission. HGB 11-> 8.3 ( stable)   # A-fib with RVR - controlled at rest.   # HFrEF 45%/ h/o AICD- TTE: EF 45-50% with moderate TR only  # MARIANNE /HAGMA/ h/o nephrolithiasis   # Transaminitis   # Euthyroid SS    Plan   - orthostatic yesterday pos due to increase HR  - s/p 1 pRBC yesterday,  for symptomatic anemia, Hb 8.3 ---> 8.7 -->8.7  -  CT abd pel noncon negative for retroperitoneal bleed, iron studies normal   - no over sings of bleeding source   - c/w with eliquis for now   - continue metoprolol 75 q8 ( patient on 300 mg at home-> 200 mg in am and 100 in pm)  - patient is on digoxin ( new medication)  -  c/w midodrine 15 q8, wean as tolerated   - Holding on GDMT due to hypotension-> Entresto, Aldactone, farxiga  - TSH low, Ft4 normal, t3 low, euthyroid SS, team discussed with endocrine, no meds needed and outpt f/u for repeat TFTs in 4-6 weeks     # Gi prophylaxis   - on PPIs    #Progress Note Handoff  Pending (specify): Anemia workup , monitor BP, re introducing GDMT when bp allows -- follow CT abd r/o retroperitoneal bleed         74-year-old male with past medical history of hypertension, dyslipidemia, mitral valve regurg, A-fib on Coumadin,HFrEF with an EF of 45% in december , and status post AICD presenting for weakness.      # Hypovolemic Shock suspecting due to anemia   # Orthostatic Hypotension - HR elevated on ambulation  # Anemia- INR elevated on admission. HGB 11-> 8.3 ( stable)   # A-fib with RVR - controlled at rest.   # HFrEF 45%/ h/o AICD- TTE: EF 45-50% with moderate TR only-- not in fluid overload  # MARIANNE /HAGMA/ h/o nephrolithiasis   # Transaminitis   # Euthyroid SS    Plan   - orthostatic pos due to increase HR--seen by EPS--ICD was interrogated, device functions well. Tachy therapy zones were reprogrammed, so AF with RVR would not receive inappropriate shocks.   - s/p 1 pRBC yesterday,  for symptomatic anemia, Hb 8.3 ---> 8.7 -->8.7  -  CT abd pel noncon negative for retroperitoneal bleed, iron studies normal   - no over sings of bleeding source   - c/w with eliquis for now   - continue metoprolol 75 q8 ( patient on 300 mg at home-> 200 mg in am and 100 in pm)  - patient is on digoxin ( new medication)  -  c/w midodrine 15 q8, wean as tolerated   - Holding on GDMT due to hypotension-> Entresto, Aldactone, farxiga  - TSH low, Ft4 normal, t3 low, euthyroid SS, team discussed with endocrine, no meds needed and outpt f/u for repeat TFTs in 4-6 weeks     # Gi prophylaxis   - on PPIs    #Progress Note Handoff  Pending (specify): Anemia workup , monitor BP, re introducing GDMT when bp allows --start farxiga today

## 2025-02-23 NOTE — PROGRESS NOTE ADULT - SUBJECTIVE AND OBJECTIVE BOX
SUBJECTIVE:    Patient is a 74y old Male who presents with a chief complaint of weakness (16 Feb 2025 11:59)    Currently admitted to medicine with the primary diagnosis of Hypotension       Today is hospital day 19d.     PAST MEDICAL & SURGICAL HISTORY    ALLERGIES:  No Known Allergies    MEDICATIONS:  STANDING MEDICATIONS  apixaban 5 milliGRAM(s) Oral two times a day  chlorhexidine 2% Cloths 1 Application(s) Topical daily  digoxin     Tablet 125 MICROGram(s) Oral daily  ezetimibe 10 milliGRAM(s) Oral daily  magnesium oxide 400 milliGRAM(s) Oral three times a day with meals  metoprolol tartrate 75 milliGRAM(s) Oral every 8 hours  midodrine 15 milliGRAM(s) Oral every 8 hours  pantoprazole    Tablet 40 milliGRAM(s) Oral daily    PRN MEDICATIONS  lidocaine 5% Ointment 1 Application(s) Topical four times a day PRN    VITALS:   T(F): 97.9  HR: 73  BP: 107/67  RR: 18  SpO2: 100%    LABS:                        8.7    4.70  )-----------( 265      ( 23 Feb 2025 04:30 )             28.4     02-23    141  |  106  |  16  ----------------------------<  87  4.3   |  25  |  0.9    Ca    8.5      23 Feb 2025 04:30  Phos  3.0     02-23  Mg     1.8     02-23    TPro  5.2[L]  /  Alb  3.4[L]  /  TBili  0.8  /  DBili  x   /  AST  11  /  ALT  21  /  AlkPhos  65  02-23    PT/INR - ( 22 Feb 2025 04:50 )   PT: 14.50 sec;   INR: 1.22 ratio         PTT - ( 22 Feb 2025 04:50 )  PTT:33.5 sec  Urinalysis Basic - ( 23 Feb 2025 04:30 )    Color: x / Appearance: x / SG: x / pH: x  Gluc: 87 mg/dL / Ketone: x  / Bili: x / Urobili: x   Blood: x / Protein: x / Nitrite: x   Leuk Esterase: x / RBC: x / WBC x   Sq Epi: x / Non Sq Epi: x / Bacteria: x                RADIOLOGY:    PHYSICAL EXAM:  GEN: No acute distress  LUNGS: Clear to auscultation bilaterally   HEART: S1/S2 present. RRR.   ABD/ GI: Soft, non-tender, non-distended. Bowel sounds present  EXT: NC/NC/NE/2+PP/COLVIN  NEURO: AAOX3     Rib contusion

## 2025-02-24 LAB
ALBUMIN SERPL ELPH-MCNC: 3.5 G/DL — SIGNIFICANT CHANGE UP (ref 3.5–5.2)
ALP SERPL-CCNC: 66 U/L — SIGNIFICANT CHANGE UP (ref 30–115)
ALT FLD-CCNC: 19 U/L — SIGNIFICANT CHANGE UP (ref 0–41)
ANION GAP SERPL CALC-SCNC: 11 MMOL/L — SIGNIFICANT CHANGE UP (ref 7–14)
AST SERPL-CCNC: 11 U/L — SIGNIFICANT CHANGE UP (ref 0–41)
BASOPHILS # BLD AUTO: 0.05 K/UL — SIGNIFICANT CHANGE UP (ref 0–0.2)
BASOPHILS NFR BLD AUTO: 1.1 % — HIGH (ref 0–1)
BILIRUB SERPL-MCNC: 0.7 MG/DL — SIGNIFICANT CHANGE UP (ref 0.2–1.2)
BUN SERPL-MCNC: 20 MG/DL — SIGNIFICANT CHANGE UP (ref 10–20)
CALCIUM SERPL-MCNC: 8.7 MG/DL — SIGNIFICANT CHANGE UP (ref 8.4–10.5)
CHLORIDE SERPL-SCNC: 106 MMOL/L — SIGNIFICANT CHANGE UP (ref 98–110)
CO2 SERPL-SCNC: 24 MMOL/L — SIGNIFICANT CHANGE UP (ref 17–32)
CREAT SERPL-MCNC: 1 MG/DL — SIGNIFICANT CHANGE UP (ref 0.7–1.5)
EGFR: 79 ML/MIN/1.73M2 — SIGNIFICANT CHANGE UP
EOSINOPHIL # BLD AUTO: 0.3 K/UL — SIGNIFICANT CHANGE UP (ref 0–0.7)
EOSINOPHIL NFR BLD AUTO: 6.4 % — SIGNIFICANT CHANGE UP (ref 0–8)
GLUCOSE SERPL-MCNC: 88 MG/DL — SIGNIFICANT CHANGE UP (ref 70–99)
HCT VFR BLD CALC: 28.3 % — LOW (ref 42–52)
HGB BLD-MCNC: 8.7 G/DL — LOW (ref 14–18)
IMM GRANULOCYTES NFR BLD AUTO: 0.4 % — HIGH (ref 0.1–0.3)
LYMPHOCYTES # BLD AUTO: 1.04 K/UL — LOW (ref 1.2–3.4)
LYMPHOCYTES # BLD AUTO: 22.3 % — SIGNIFICANT CHANGE UP (ref 20.5–51.1)
MAGNESIUM SERPL-MCNC: 1.9 MG/DL — SIGNIFICANT CHANGE UP (ref 1.8–2.4)
MCHC RBC-ENTMCNC: 30.2 PG — SIGNIFICANT CHANGE UP (ref 27–31)
MCHC RBC-ENTMCNC: 30.7 G/DL — LOW (ref 32–37)
MCV RBC AUTO: 98.3 FL — HIGH (ref 80–94)
MONOCYTES # BLD AUTO: 0.36 K/UL — SIGNIFICANT CHANGE UP (ref 0.1–0.6)
MONOCYTES NFR BLD AUTO: 7.7 % — SIGNIFICANT CHANGE UP (ref 1.7–9.3)
NEUTROPHILS # BLD AUTO: 2.9 K/UL — SIGNIFICANT CHANGE UP (ref 1.4–6.5)
NEUTROPHILS NFR BLD AUTO: 62.1 % — SIGNIFICANT CHANGE UP (ref 42.2–75.2)
NRBC BLD AUTO-RTO: 0 /100 WBCS — SIGNIFICANT CHANGE UP (ref 0–0)
PHOSPHATE SERPL-MCNC: 3.6 MG/DL — SIGNIFICANT CHANGE UP (ref 2.1–4.9)
PLATELET # BLD AUTO: 275 K/UL — SIGNIFICANT CHANGE UP (ref 130–400)
PMV BLD: 10.2 FL — SIGNIFICANT CHANGE UP (ref 7.4–10.4)
POTASSIUM SERPL-MCNC: 4.4 MMOL/L — SIGNIFICANT CHANGE UP (ref 3.5–5)
POTASSIUM SERPL-SCNC: 4.4 MMOL/L — SIGNIFICANT CHANGE UP (ref 3.5–5)
PROT SERPL-MCNC: 5.4 G/DL — LOW (ref 6–8)
RBC # BLD: 2.88 M/UL — LOW (ref 4.7–6.1)
RBC # FLD: 14.9 % — HIGH (ref 11.5–14.5)
SODIUM SERPL-SCNC: 141 MMOL/L — SIGNIFICANT CHANGE UP (ref 135–146)
WBC # BLD: 4.67 K/UL — LOW (ref 4.8–10.8)
WBC # FLD AUTO: 4.67 K/UL — LOW (ref 4.8–10.8)

## 2025-02-24 PROCEDURE — 99232 SBSQ HOSP IP/OBS MODERATE 35: CPT

## 2025-02-24 RX ORDER — METOPROLOL SUCCINATE 50 MG/1
100 TABLET, EXTENDED RELEASE ORAL EVERY 8 HOURS
Refills: 0 | Status: DISCONTINUED | OUTPATIENT
Start: 2025-02-24 | End: 2025-02-27

## 2025-02-24 RX ADMIN — MIDODRINE HYDROCHLORIDE 15 MILLIGRAM(S): 5 TABLET ORAL at 22:12

## 2025-02-24 RX ADMIN — Medication 400 MILLIGRAM(S): at 17:14

## 2025-02-24 RX ADMIN — MIDODRINE HYDROCHLORIDE 15 MILLIGRAM(S): 5 TABLET ORAL at 05:00

## 2025-02-24 RX ADMIN — METOPROLOL SUCCINATE 75 MILLIGRAM(S): 50 TABLET, EXTENDED RELEASE ORAL at 05:00

## 2025-02-24 RX ADMIN — APIXABAN 5 MILLIGRAM(S): 2.5 TABLET, FILM COATED ORAL at 17:14

## 2025-02-24 RX ADMIN — METOPROLOL SUCCINATE 100 MILLIGRAM(S): 50 TABLET, EXTENDED RELEASE ORAL at 12:21

## 2025-02-24 RX ADMIN — MIDODRINE HYDROCHLORIDE 15 MILLIGRAM(S): 5 TABLET ORAL at 16:08

## 2025-02-24 RX ADMIN — DIGOXIN 125 MICROGRAM(S): 250 TABLET ORAL at 05:01

## 2025-02-24 RX ADMIN — Medication 400 MILLIGRAM(S): at 12:21

## 2025-02-24 RX ADMIN — DAPAGLIFLOZIN 10 MILLIGRAM(S): 5 TABLET, FILM COATED ORAL at 12:22

## 2025-02-24 RX ADMIN — EZETIMIBE 10 MILLIGRAM(S): 10 TABLET ORAL at 12:22

## 2025-02-24 RX ADMIN — Medication 40 MILLIGRAM(S): at 12:21

## 2025-02-24 RX ADMIN — Medication 1 APPLICATION(S): at 12:27

## 2025-02-24 RX ADMIN — METOPROLOL SUCCINATE 100 MILLIGRAM(S): 50 TABLET, EXTENDED RELEASE ORAL at 21:03

## 2025-02-24 RX ADMIN — APIXABAN 5 MILLIGRAM(S): 2.5 TABLET, FILM COATED ORAL at 05:01

## 2025-02-24 NOTE — PROGRESS NOTE ADULT - SUBJECTIVE AND OBJECTIVE BOX
Patient with intermittent rapid rates on ambulation. Tolerating Digoxin. Will increase Lopressor 100 PO q8H

## 2025-02-24 NOTE — PROGRESS NOTE ADULT - SUBJECTIVE AND OBJECTIVE BOX
ALLIE TORRE 74y Male  MRN#: [id number]   Hospital Day: 20d    HPI:    Patient is a 74-year-old male with past medical history of hypertension, dyslipidemia, mitral valve regurg, A-fib on Coumadin,HFrEF with an EF of 45% in december , and status post AICD presenting for weakness. Patient endorses persistent, complaining generalized weakness, fatigue, anorexia and poor p.o. intake for the past 5-6 weeks. Patient also reports a significant weight loss. Patient  states he had recent blood work which showed worsening kidney function, creatinine was 3.1 in  january up from a baseline of 1,2 in december and currently creatinine in 5.3.         · BP   65 mm Hg/45 mm Hg  · Heart Rate  86 /min  · Temp (C)  36.9 Degrees C oral   · SpO2 (%)  98 % on room air      (04 Feb 2025 13:48)      SUBJECTIVE  Patient is a 74y old Male who presents with a chief complaint of weakness (16 Feb 2025 11:59)  Currently admitted to medicine with the primary diagnosis of Hypotension      INTERVAL HPI AND OVERNIGHT EVENTS:  Patient was examined and seen at bedside. No reported events overnight.    OBJECTIVE  PAST MEDICAL & SURGICAL HISTORY    ALLERGIES:  No Known Allergies    MEDICATIONS:  STANDING MEDICATIONS  apixaban 5 milliGRAM(s) Oral two times a day  chlorhexidine 2% Cloths 1 Application(s) Topical daily  dapagliflozin 10 milliGRAM(s) Oral daily  digoxin     Tablet 125 MICROGram(s) Oral daily  ezetimibe 10 milliGRAM(s) Oral daily  magnesium oxide 400 milliGRAM(s) Oral three times a day with meals  metoprolol tartrate 75 milliGRAM(s) Oral every 8 hours  midodrine 15 milliGRAM(s) Oral every 8 hours  pantoprazole    Tablet 40 milliGRAM(s) Oral daily    PRN MEDICATIONS  lidocaine 5% Ointment 1 Application(s) Topical four times a day PRN      VITAL SIGNS: Last 24 Hours  T(C): 36.9 (24 Feb 2025 04:00), Max: 37 (23 Feb 2025 11:10)  T(F): 98.5 (24 Feb 2025 04:00), Max: 98.6 (23 Feb 2025 11:10)  HR: 78 (24 Feb 2025 04:00) (65 - 158)  BP: 103/58 (24 Feb 2025 04:00) (103/58 - 116/71)  BP(mean): 73 (24 Feb 2025 04:00) (73 - 73)  RR: 18 (24 Feb 2025 04:00) (18 - 18)  SpO2: 94% (24 Feb 2025 04:00) (94% - 96%)    LABS:                        8.7    4.67  )-----------( 275      ( 24 Feb 2025 04:16 )             28.3     02-24    141  |  106  |  20  ----------------------------<  88  4.4   |  24  |  1.0    Ca    8.7      24 Feb 2025 04:16  Phos  3.6     02-24  Mg     1.9     02-24    TPro  5.4[L]  /  Alb  3.5  /  TBili  0.7  /  DBili  x   /  AST  11  /  ALT  19  /  AlkPhos  66  02-24      Urinalysis Basic - ( 24 Feb 2025 04:16 )    Color: x / Appearance: x / SG: x / pH: x  Gluc: 88 mg/dL / Ketone: x  / Bili: x / Urobili: x   Blood: x / Protein: x / Nitrite: x   Leuk Esterase: x / RBC: x / WBC x   Sq Epi: x / Non Sq Epi: x / Bacteria: x      PHYSICAL EXAM:  CONSTITUTIONAL: No acute distress  HEAD: Atraumatic, normocephalic  EYES: EOM intact, PERRLA, conjunctiva and sclera clear  ENT: Supple, no masses, no thyromegaly, no bruits, no JVD; moist mucous membranes  PULMONARY: Clear to auscultation bilaterally; no wheezes, rales, or rhonchi  CARDIOVASCULAR: Regular rate and rhythm; no murmurs, rubs, or gallops  GASTROINTESTINAL: Soft, non-tender, non-distended; bowel sounds present  MUSCULOSKELETAL: 2+ peripheral pulses; no clubbing, no cyanosis, no edema  NEUROLOGY: non-focal    ASSESSMENT & PLAN    74-year-old male with past medical history of hypertension, dyslipidemia, mitral valve regurg, A-fib on Coumadin,HFrEF with an EF of 45% in december , and status post AICD presenting for weakness.      2/24/2025  - f/u EP> metop increased to 100 TID  - repeat ambulatory HR  ------------    # Hypovolemic Shock suspecting due to anemia   # Orthostatic Hypotension - HR elevated on ambulation  # Anemia- INR elevated on admission. HGB 11-> 8.3 ( stable)   # A-fib with RVR - controlled at rest.   # HFrEF 45%/ h/o AICD- TTE: EF 45-50% with moderate TR only-- not in fluid overload  # MARIANNE /HAGMA/ h/o nephrolithiasis   # Transaminitis   # Euthyroid SS  - orthostatic pos due to increase HR--seen by EPS--ICD was interrogated, device functions well. Tachy therapy zones were reprogrammed, so AF with RVR would not receive inappropriate shocks.   - s/p 1 pRBC yesterday,  for symptomatic anemia, Hb 8.3 ---> 8.7 -->8.7  - CT abd pel noncon negative for retroperitoneal bleed, iron studies normal   - no over sings of bleeding source   - c/w with eliquis for now   -  metoprolol 100 q8 ( patient on 300 mg at home-> 200 mg in am and 100 in pm)  - patient is on digoxin ( new medication)  -  c/w midodrine 15 q8, wean as tolerated   - Holding on GDMT due to hypotension-> Entresto, Aldactone, farxiga  - TSH low, Ft4 normal, t3 low, euthyroid SS, team discussed with endocrine, no meds needed and outpt f/u for repeat TFTs in 4-6 weeks  - 2/24 metoprolol TID    # Gi prophylaxis   - on PPIs

## 2025-02-25 LAB
ALBUMIN SERPL ELPH-MCNC: 3.5 G/DL — SIGNIFICANT CHANGE UP (ref 3.5–5.2)
ALP SERPL-CCNC: 71 U/L — SIGNIFICANT CHANGE UP (ref 30–115)
ALT FLD-CCNC: 16 U/L — SIGNIFICANT CHANGE UP (ref 0–41)
ANION GAP SERPL CALC-SCNC: 10 MMOL/L — SIGNIFICANT CHANGE UP (ref 7–14)
AST SERPL-CCNC: 11 U/L — SIGNIFICANT CHANGE UP (ref 0–41)
BILIRUB SERPL-MCNC: 1 MG/DL — SIGNIFICANT CHANGE UP (ref 0.2–1.2)
BUN SERPL-MCNC: 16 MG/DL — SIGNIFICANT CHANGE UP (ref 10–20)
CALCIUM SERPL-MCNC: 8.9 MG/DL — SIGNIFICANT CHANGE UP (ref 8.4–10.4)
CHLORIDE SERPL-SCNC: 105 MMOL/L — SIGNIFICANT CHANGE UP (ref 98–110)
CO2 SERPL-SCNC: 26 MMOL/L — SIGNIFICANT CHANGE UP (ref 17–32)
CREAT SERPL-MCNC: 1.1 MG/DL — SIGNIFICANT CHANGE UP (ref 0.7–1.5)
EGFR: 71 ML/MIN/1.73M2 — SIGNIFICANT CHANGE UP
GLUCOSE SERPL-MCNC: 89 MG/DL — SIGNIFICANT CHANGE UP (ref 70–99)
HCT VFR BLD CALC: 28.9 % — LOW (ref 42–52)
HGB BLD-MCNC: 8.9 G/DL — LOW (ref 14–18)
MAGNESIUM SERPL-MCNC: 1.8 MG/DL — SIGNIFICANT CHANGE UP (ref 1.8–2.4)
MCHC RBC-ENTMCNC: 29.8 PG — SIGNIFICANT CHANGE UP (ref 27–31)
MCHC RBC-ENTMCNC: 30.8 G/DL — LOW (ref 32–37)
MCV RBC AUTO: 96.7 FL — HIGH (ref 80–94)
NRBC BLD AUTO-RTO: 0 /100 WBCS — SIGNIFICANT CHANGE UP (ref 0–0)
PLATELET # BLD AUTO: 281 K/UL — SIGNIFICANT CHANGE UP (ref 130–400)
PMV BLD: 10.1 FL — SIGNIFICANT CHANGE UP (ref 7.4–10.4)
POTASSIUM SERPL-MCNC: 4.5 MMOL/L — SIGNIFICANT CHANGE UP (ref 3.5–5)
POTASSIUM SERPL-SCNC: 4.5 MMOL/L — SIGNIFICANT CHANGE UP (ref 3.5–5)
PROT SERPL-MCNC: 5.5 G/DL — LOW (ref 6–8)
RBC # BLD: 2.99 M/UL — LOW (ref 4.7–6.1)
RBC # FLD: 14.7 % — HIGH (ref 11.5–14.5)
SODIUM SERPL-SCNC: 141 MMOL/L — SIGNIFICANT CHANGE UP (ref 135–146)
WBC # BLD: 6.07 K/UL — SIGNIFICANT CHANGE UP (ref 4.8–10.8)
WBC # FLD AUTO: 6.07 K/UL — SIGNIFICANT CHANGE UP (ref 4.8–10.8)

## 2025-02-25 PROCEDURE — 99232 SBSQ HOSP IP/OBS MODERATE 35: CPT

## 2025-02-25 RX ORDER — LIDOCAINE HYDROCHLORIDE 20 MG/ML
1 JELLY TOPICAL DAILY
Refills: 0 | Status: DISCONTINUED | OUTPATIENT
Start: 2025-02-25 | End: 2025-02-28

## 2025-02-25 RX ORDER — LIDOCAINE HYDROCHLORIDE 20 MG/ML
1 JELLY TOPICAL ONCE
Refills: 0 | Status: COMPLETED | OUTPATIENT
Start: 2025-02-25 | End: 2025-02-25

## 2025-02-25 RX ORDER — ACETAMINOPHEN 500 MG/5ML
650 LIQUID (ML) ORAL EVERY 6 HOURS
Refills: 0 | Status: DISCONTINUED | OUTPATIENT
Start: 2025-02-25 | End: 2025-02-28

## 2025-02-25 RX ORDER — OXYCODONE HYDROCHLORIDE 30 MG/1
2.5 TABLET ORAL ONCE
Refills: 0 | Status: DISCONTINUED | OUTPATIENT
Start: 2025-02-25 | End: 2025-02-25

## 2025-02-25 RX ADMIN — Medication 400 MILLIGRAM(S): at 11:07

## 2025-02-25 RX ADMIN — OXYCODONE HYDROCHLORIDE 2.5 MILLIGRAM(S): 30 TABLET ORAL at 19:07

## 2025-02-25 RX ADMIN — MIDODRINE HYDROCHLORIDE 15 MILLIGRAM(S): 5 TABLET ORAL at 21:24

## 2025-02-25 RX ADMIN — LIDOCAINE HYDROCHLORIDE 1 PATCH: 20 JELLY TOPICAL at 12:11

## 2025-02-25 RX ADMIN — MIDODRINE HYDROCHLORIDE 15 MILLIGRAM(S): 5 TABLET ORAL at 05:58

## 2025-02-25 RX ADMIN — DIGOXIN 125 MICROGRAM(S): 250 TABLET ORAL at 05:58

## 2025-02-25 RX ADMIN — METOPROLOL SUCCINATE 100 MILLIGRAM(S): 50 TABLET, EXTENDED RELEASE ORAL at 13:19

## 2025-02-25 RX ADMIN — EZETIMIBE 10 MILLIGRAM(S): 10 TABLET ORAL at 11:07

## 2025-02-25 RX ADMIN — LIDOCAINE HYDROCHLORIDE 1 PATCH: 20 JELLY TOPICAL at 08:25

## 2025-02-25 RX ADMIN — LIDOCAINE HYDROCHLORIDE 1 PATCH: 20 JELLY TOPICAL at 19:00

## 2025-02-25 RX ADMIN — Medication 400 MILLIGRAM(S): at 17:22

## 2025-02-25 RX ADMIN — MIDODRINE HYDROCHLORIDE 15 MILLIGRAM(S): 5 TABLET ORAL at 13:19

## 2025-02-25 RX ADMIN — Medication 1 APPLICATION(S): at 11:10

## 2025-02-25 RX ADMIN — Medication 650 MILLIGRAM(S): at 01:45

## 2025-02-25 RX ADMIN — Medication 650 MILLIGRAM(S): at 17:22

## 2025-02-25 RX ADMIN — Medication 400 MILLIGRAM(S): at 08:25

## 2025-02-25 RX ADMIN — METOPROLOL SUCCINATE 100 MILLIGRAM(S): 50 TABLET, EXTENDED RELEASE ORAL at 21:24

## 2025-02-25 RX ADMIN — APIXABAN 5 MILLIGRAM(S): 2.5 TABLET, FILM COATED ORAL at 17:22

## 2025-02-25 RX ADMIN — METOPROLOL SUCCINATE 100 MILLIGRAM(S): 50 TABLET, EXTENDED RELEASE ORAL at 05:58

## 2025-02-25 RX ADMIN — Medication 40 MILLIGRAM(S): at 11:07

## 2025-02-25 RX ADMIN — Medication 650 MILLIGRAM(S): at 00:42

## 2025-02-25 RX ADMIN — Medication 650 MILLIGRAM(S): at 17:33

## 2025-02-25 RX ADMIN — LIDOCAINE HYDROCHLORIDE 1 PATCH: 20 JELLY TOPICAL at 00:42

## 2025-02-25 RX ADMIN — DAPAGLIFLOZIN 10 MILLIGRAM(S): 5 TABLET, FILM COATED ORAL at 11:07

## 2025-02-25 RX ADMIN — APIXABAN 5 MILLIGRAM(S): 2.5 TABLET, FILM COATED ORAL at 05:58

## 2025-02-25 NOTE — CHART NOTE - NSCHARTNOTEFT_GEN_A_CORE
Registered Dietitian Follow-Up     Patient Profile Reviewed                           Yes [x]   No []     Nutrition History Previously Obtained        Yes [x]  No []       Pertinent Subjective Information: Patient reports adequate appetite in hospital; denies s/s of GI dysfunction today.      Pertinent Medical Interventions: Patient is a 74-year-old male with past medical history of hypertension, dyslipidemia, mitral valve regurg, A-fib on Coumadin,HFrEF with an EF of 45% in december , and status post AICD presenting for weakness. Patient endorses persistent, complaining generalized weakness, fatigue, anorexia and poor p.o. intake for the past 5-6 weeks. Patient also reports a significant weight loss. Patient  states he had recent blood work which showed worsening kidney function, creatinine was 3.1 in  january up from a baseline of 1,2 in december and currently creatinine in 5.3.     Patient reports he has not been receiving MeroArte Renal Support 1x/day and would like to discontinue it regardless.     Patient noted with anemia.     Diet order: Diet, DASH/TLC:   Sodium & Cholesterol Restricted  Free Water Flush Instructions:  MeroArte Renal Support 1.8 1x/day (25 @ 12:29) [Active]          Anthropometrics:  Height: 160cm  Weight: 90.7kg  IBW: 56kg  BMI: 35.4    Daily Weight in k (-22), Weight in k (-20), Weight in k.4 (-)  % Weight Change    MEDICATIONS  (STANDING):  apixaban 5 milliGRAM(s) Oral two times a day  chlorhexidine 2% Cloths 1 Application(s) Topical daily  dapagliflozin 10 milliGRAM(s) Oral daily  digoxin     Tablet 125 MICROGram(s) Oral daily  ezetimibe 10 milliGRAM(s) Oral daily  lidocaine   4% Patch 1 Patch Transdermal daily  magnesium oxide 400 milliGRAM(s) Oral three times a day with meals  metoprolol tartrate 100 milliGRAM(s) Oral every 8 hours  midodrine 15 milliGRAM(s) Oral every 8 hours  pantoprazole    Tablet 40 milliGRAM(s) Oral daily    MEDICATIONS  (PRN):  acetaminophen     Tablet .. 650 milliGRAM(s) Oral every 6 hours PRN Mild Pain (1 - 3), Moderate Pain (4 - 6)  lidocaine 5% Ointment 1 Application(s) Topical four times a day PRN pain at [place] area    Pertinent Labs:  @ 04:39: Na 141, BUN 16, Cr 1.1, BG 89, K+ 4.5, Phos --, Mg 1.8, Alk Phos 71, ALT/SGPT 16, AST/SGOT 11, HbA1c --    Finger Sticks:    Physical Findings:  - Appearance: alert  - GI function: no s/s of dysfunction per patient; last BM was on 25 per patient.   - Tubes: none  - Oral/Mouth cavity: patient reports tolerating current diet texture well  - Edema: none  - Skin: ecchymotic; no pressure injuries     Nutrition Requirements:  Weight Used: current 90.7kg (as of 25) with consideration for age, weight status, acute illness      Estimated Energy Needs    Continue []  Adjust [x]  1547-1856kcal/day using MSJ x 1-1.2    Estimated Protein Needs    Continue []  Adjust [x]   56-70g/day 1-1.25g/kg of IBW 56kg  Estimated Fluid Needs        Continue [x]  Adjust []  1mL/kcal/day     Nutrient Intake: Patient reports appetite has significantly improved, having been consuming % of meals in hospital recently    [x] Previous Nutrition Diagnosis: Inadequate Protein-Energy Intake            [] Ongoing          [x] Resolved    [] No active nutrition diagnosis identified at this time     Nutrition Education: discussed current diet order and encouraged patient to maximize PO intake as able     Goal/Expected Outcome: Patient to continue to meet 75% or more of estimated nutrient needs via PO intake within 5-7 days      Interventions:  -Continue current diet order   -Remove MeroArte Renal Support      Monitor:  -PO intake  -Diet/texture tolerance  -Weight  -Nutrition related labs  -Nutrition focused physical findings    Moderate Nutrition Risk Follow Up    Sánchez Cantu RD via Teams

## 2025-02-25 NOTE — PROGRESS NOTE ADULT - SUBJECTIVE AND OBJECTIVE BOX
ALLIE TORRE 74y Male  MRN#: [id number]   Hospital Day: 21d    HPI:    Patient is a 74-year-old male with past medical history of hypertension, dyslipidemia, mitral valve regurg, A-fib on Coumadin,HFrEF with an EF of 45% in december , and status post AICD presenting for weakness. Patient endorses persistent, complaining generalized weakness, fatigue, anorexia and poor p.o. intake for the past 5-6 weeks. Patient also reports a significant weight loss. Patient  states he had recent blood work which showed worsening kidney function, creatinine was 3.1 in  january up from a baseline of 1,2 in december and currently creatinine in 5.3.         · BP   65 mm Hg/45 mm Hg  · Heart Rate  86 /min  · Temp (C)  36.9 Degrees C oral   · SpO2 (%)  98 % on room air      (04 Feb 2025 13:48)      SUBJECTIVE  Patient is a 74y old Male who presents with a chief complaint of AF (24 Feb 2025 10:32)  Currently admitted to medicine with the primary diagnosis of Hypotension      INTERVAL HPI AND OVERNIGHT EVENTS:  Patient was examined and seen at bedside. This morning he is resting comfortably in bed and reports no issues or overnight events.      OBJECTIVE  PAST MEDICAL & SURGICAL HISTORY    ALLERGIES:  No Known Allergies    MEDICATIONS:  STANDING MEDICATIONS  apixaban 5 milliGRAM(s) Oral two times a day  chlorhexidine 2% Cloths 1 Application(s) Topical daily  dapagliflozin 10 milliGRAM(s) Oral daily  digoxin     Tablet 125 MICROGram(s) Oral daily  ezetimibe 10 milliGRAM(s) Oral daily  lidocaine   4% Patch 1 Patch Transdermal daily  magnesium oxide 400 milliGRAM(s) Oral three times a day with meals  metoprolol tartrate 100 milliGRAM(s) Oral every 8 hours  midodrine 15 milliGRAM(s) Oral every 8 hours  pantoprazole    Tablet 40 milliGRAM(s) Oral daily    PRN MEDICATIONS  acetaminophen     Tablet .. 650 milliGRAM(s) Oral every 6 hours PRN  lidocaine 5% Ointment 1 Application(s) Topical four times a day PRN      VITAL SIGNS: Last 24 Hours  T(C): 37.2 (25 Feb 2025 04:36), Max: 37.2 (25 Feb 2025 04:36)  T(F): 99 (25 Feb 2025 04:36), Max: 99 (25 Feb 2025 04:36)  HR: 81 (25 Feb 2025 04:36) (62 - 81)  BP: 109/69 (25 Feb 2025 04:36) (109/69 - 128/67)  BP(mean): --  RR: 18 (25 Feb 2025 04:36) (18 - 18)  SpO2: 96% (25 Feb 2025 04:36) (96% - 96%)    LABS:                        8.9    6.07  )-----------( 281      ( 25 Feb 2025 04:39 )             28.9     02-25    141  |  105  |  16  ----------------------------<  89  4.5   |  26  |  1.1    Ca    8.9      25 Feb 2025 04:39  Phos  3.6     02-24  Mg     1.8     02-25    TPro  5.5[L]  /  Alb  3.5  /  TBili  1.0  /  DBili  x   /  AST  11  /  ALT  16  /  AlkPhos  71  02-25      Urinalysis Basic - ( 25 Feb 2025 04:39 )    Color: x / Appearance: x / SG: x / pH: x  Gluc: 89 mg/dL / Ketone: x  / Bili: x / Urobili: x   Blood: x / Protein: x / Nitrite: x   Leuk Esterase: x / RBC: x / WBC x   Sq Epi: x / Non Sq Epi: x / Bacteria: x        PHYSICAL EXAM:  CONSTITUTIONAL: No acute distress  HEAD: Atraumatic, normocephalic  EYES: EOM intact, PERRLA, conjunctiva and sclera clear  ENT: Supple, no masses, no thyromegaly, no bruits, no JVD; moist mucous membranes  PULMONARY: Clear to auscultation bilaterally; no wheezes, rales, or rhonchi  CARDIOVASCULAR: Irregular; no murmurs, rubs, or gallops  GASTROINTESTINAL: Soft, non-tender, non-distended; bowel sounds present  MUSCULOSKELETAL: 2+ peripheral pulses; no clubbing, no cyanosis, no edema  NEUROLOGY: non-focal      ASSESSMENT & PLAN        74-year-old male with past medical history of hypertension, dyslipidemia, mitral valve regurg, A-fib on Coumadin,HFrEF with an EF of 45% in december , and status post AICD presenting for weakness.      2/25/2025  - repeat ambulatory HR  ------------    # Hypovolemic Shock suspecting due to anemia   # Orthostatic Hypotension - HR elevated on ambulation  # Anemia- INR elevated on admission. HGB 11-> 8.3 ( stable)   # A-fib with RVR - controlled at rest.   # HFrEF 45%/ h/o AICD- TTE: EF 45-50% with moderate TR only-- not in fluid overload  # MARIANNE /HAGMA/ h/o nephrolithiasis   # Transaminitis   # Euthyroid SS  - orthostatic pos due to increase HR--seen by EPS--ICD was interrogated, device functions well. Tachy therapy zones were reprogrammed, so AF with RVR would not receive inappropriate shocks.   - s/p 1 pRBC yesterday,  for symptomatic anemia, Hb 8.3 ---> 8.7 -->8.7  - CT abd pel noncon negative for retroperitoneal bleed, iron studies normal   - no over sings of bleeding source   - c/w with eliquis for now   -  metoprolol 100 q8 ( patient on 300 mg at home-> 200 mg in am and 100 in pm)  - patient is on digoxin ( new medication)  -  c/w midodrine 15 q8, wean as tolerated   - Holding on GDMT due to hypotension-> Entresto, Aldactone, farxiga  - TSH low, Ft4 normal, t3 low, euthyroid SS, team discussed with endocrine, no meds needed and outpt f/u for repeat TFTs in 4-6 weeks  - 2/24 metoprolol 100 TID    # Gi prophylaxis   - on PPIs   ALLIE TORRE 74y Male  MRN#: [id number]   Hospital Day: 21d    HPI:    Patient is a 74-year-old male with past medical history of hypertension, dyslipidemia, mitral valve regurg, A-fib on Coumadin,HFrEF with an EF of 45% in december , and status post AICD presenting for weakness. Patient endorses persistent, complaining generalized weakness, fatigue, anorexia and poor p.o. intake for the past 5-6 weeks. Patient also reports a significant weight loss. Patient  states he had recent blood work which showed worsening kidney function, creatinine was 3.1 in  january up from a baseline of 1,2 in december and currently creatinine in 5.3.         · BP   65 mm Hg/45 mm Hg  · Heart Rate  86 /min  · Temp (C)  36.9 Degrees C oral   · SpO2 (%)  98 % on room air      (04 Feb 2025 13:48)      SUBJECTIVE  Patient is a 74y old Male who presents with a chief complaint of AF (24 Feb 2025 10:32)  Currently admitted to medicine with the primary diagnosis of Hypotension      INTERVAL HPI AND OVERNIGHT EVENTS:  Patient was examined and seen at bedside. This morning he is resting comfortably in bed and reports no issues or overnight events.      OBJECTIVE  PAST MEDICAL & SURGICAL HISTORY    ALLERGIES:  No Known Allergies    MEDICATIONS:  STANDING MEDICATIONS  apixaban 5 milliGRAM(s) Oral two times a day  chlorhexidine 2% Cloths 1 Application(s) Topical daily  dapagliflozin 10 milliGRAM(s) Oral daily  digoxin     Tablet 125 MICROGram(s) Oral daily  ezetimibe 10 milliGRAM(s) Oral daily  lidocaine   4% Patch 1 Patch Transdermal daily  magnesium oxide 400 milliGRAM(s) Oral three times a day with meals  metoprolol tartrate 100 milliGRAM(s) Oral every 8 hours  midodrine 15 milliGRAM(s) Oral every 8 hours  pantoprazole    Tablet 40 milliGRAM(s) Oral daily    PRN MEDICATIONS  acetaminophen     Tablet .. 650 milliGRAM(s) Oral every 6 hours PRN  lidocaine 5% Ointment 1 Application(s) Topical four times a day PRN      VITAL SIGNS: Last 24 Hours  T(C): 37.2 (25 Feb 2025 04:36), Max: 37.2 (25 Feb 2025 04:36)  T(F): 99 (25 Feb 2025 04:36), Max: 99 (25 Feb 2025 04:36)  HR: 81 (25 Feb 2025 04:36) (62 - 81)  BP: 109/69 (25 Feb 2025 04:36) (109/69 - 128/67)  BP(mean): --  RR: 18 (25 Feb 2025 04:36) (18 - 18)  SpO2: 96% (25 Feb 2025 04:36) (96% - 96%)    LABS:                        8.9    6.07  )-----------( 281      ( 25 Feb 2025 04:39 )             28.9     02-25    141  |  105  |  16  ----------------------------<  89  4.5   |  26  |  1.1    Ca    8.9      25 Feb 2025 04:39  Phos  3.6     02-24  Mg     1.8     02-25    TPro  5.5[L]  /  Alb  3.5  /  TBili  1.0  /  DBili  x   /  AST  11  /  ALT  16  /  AlkPhos  71  02-25      Urinalysis Basic - ( 25 Feb 2025 04:39 )    Color: x / Appearance: x / SG: x / pH: x  Gluc: 89 mg/dL / Ketone: x  / Bili: x / Urobili: x   Blood: x / Protein: x / Nitrite: x   Leuk Esterase: x / RBC: x / WBC x   Sq Epi: x / Non Sq Epi: x / Bacteria: x        PHYSICAL EXAM:  CONSTITUTIONAL: No acute distress  HEAD: Atraumatic, normocephalic  EYES: EOM intact, PERRLA, conjunctiva and sclera clear  ENT: Supple, no masses, no thyromegaly, no bruits, no JVD; moist mucous membranes  PULMONARY: Clear to auscultation bilaterally; no wheezes, rales, or rhonchi  CARDIOVASCULAR: Irregular; no murmurs, rubs, or gallops  GASTROINTESTINAL: Soft, non-tender, non-distended; bowel sounds present  MUSCULOSKELETAL: 2+ peripheral pulses; no clubbing, no cyanosis, no edema  NEUROLOGY: non-focal      ASSESSMENT & PLAN    74-year-old male with past medical history of hypertension, dyslipidemia, mitral valve regurg, A-fib on Coumadin,HFrEF with an EF of 45% in december , and status post AICD presenting for weakness.      2/25/2025  - repeat ambulatory HR  - orthostats  - PT  ------------    # Hypovolemic Shock suspecting due to anemia   # Orthostatic Hypotension - HR elevated on ambulation  # Anemia- INR elevated on admission. HGB 11-> 8.3 ( stable)   # A-fib with RVR - controlled at rest.   # HFrEF 45%/ h/o AICD- TTE: EF 45-50% with moderate TR only-- not in fluid overload  # MARIANNE /HAGMA/ h/o nephrolithiasis   # Transaminitis   # Euthyroid SS  - orthostatic pos due to increase HR--seen by EPS--ICD was interrogated, device functions well. Tachy therapy zones were reprogrammed, so AF with RVR would not receive inappropriate shocks.   - s/p 1 pRBC yesterday,  for symptomatic anemia, Hb 8.3 ---> 8.7 -->8.7  - CT abd pel noncon negative for retroperitoneal bleed, iron studies normal   - no over sings of bleeding source   - c/w with eliquis for now   -  metoprolol 100 q8 ( patient on 300 mg at home-> 200 mg in am and 100 in pm)  - patient is on digoxin ( new medication)  -  c/w midodrine 15 q8, wean as tolerated   - Holding on GDMT due to hypotension-> Entresto, Aldactone, farxiga  - TSH low, Ft4 normal, t3 low, euthyroid SS, team discussed with endocrine, no meds needed and outpt f/u for repeat TFTs in 4-6 weeks  - 2/24 metoprolol 100 TID    # Gi prophylaxis   - on PPIs

## 2025-02-25 NOTE — PROGRESS NOTE ADULT - ATTENDING COMMENTS
IMPRESSION:    MARIANNE, nonoliguric- improved  Euthyroid ss  Hypotension resolved.  now on Midodrine   Transaminitis.    Metabolic acidosis resolved   Chronic A fib on warfarin for valvular  A fib   HFmrEF EF of 45%   H/ O HTN    Plan as outlined above
***My note supersedes any discrepancies that may be above in the resident's note***    74-year-old male with past medical history of hypertension, dyslipidemia, mitral valve regurg, A-fib on Coumadin,HFrEF with an EF of 45% in december , and status post AICD presenting for weakness.      GENERAL: NAD, well-groomed, well-developed  PSYCH: no agitation, baseline mentation  NERVOUS SYSTEM:  Alert & Oriented X3   PULMONARY: Clear to percussion bilaterally; No rales, rhonchi, wheezing, or rubs  CARDIOVASCULAR: Regular rate and rhythm; No murmurs, rubs, or gallops  GI: , Nondistended; Bowel sounds present  EXTREMITIES:   No clubbing, cyanosis, or edema  SKIN: No rashes or lesions    # Hypovolemic Shock due to polypharmacy   # A-fib with RVR   # HFrEF 45%/ h/o AICD  - currently HDS, afebrile and sating well on RA  -  off pressors now  -  c/w midodrine 15 q8, wean as tolerated   - TSH low, Ft4 normal, t3 low, euthyroid SS, discussed with endocrine yesterday, no meds needed and outpt f/u for repeat TFTs in 4-6 weeks   - TTE: EF 45-50% with moderate TR only  - case discussed with pt cardio Dr Cunningham ,  echo unchanged since Nov 2023, cath was ok a few years ago.   - consulted by cardiology , recommendations noted  - metoprolol  25mg --> metop tartrate 50mg qid;   HR goal ~70-80s bpm; HOWEVER, on ambulation goes up to 150s  - holding entresto 2/15  - Aldactone and Farxiga can be started outpatient.   - Replete Mg. Monitor electrolytes. Keep K > 4 and Mg > 2.2.  - recalling EP--> started Dig; patient continues to not be well controlled.    # Orthostatic Hypotension - improved  - check orthostatic VS Q shift   - ACE wrap on LE, abdominal binder   - fall precautions     #Anemia  -gave 1uprbc 2/15  -anemia of chronic disease  -pt with hx of sigmoidectomy, pt is refusing a repeat CT abd/pelvis or to see GI at this time    # MARIANNE /HAGMA/ h/o nephrolithiasis   - resolved  - d/c Calcium Acetate   - monitor BUN/cr and urine output   - keep MAP above 65  -encourage po hydration   - CT noted: No acute abnormality in the abdomen and pelvis. No hydronephrosis, Left-sided nephrolithiasis.  -nephro signed off       # Transaminitis   - no RUQ pain, monitor LFTs   - avoid hepatotoxic meds, BP control and trend LFTs  -improving     # Gi prophylaxis   - on PPIs    #Progress Note Handoff  Pending (specify):  EP recall for uncontrolled HR .
Patient seen at bedside. Changes made within note as well. Discussed with medical team that includes resident(s), medical student(s), nursing staff, case management.     as per initial summary     74-year-old male with past medical history of hypertension, dyslipidemia, mitral valve regurg, A-fib on Coumadin,HFrEF with an EF of 45% in december , and status post AICD presenting for weakness.      # Hypovolemic Shock suspecting due to anemia   # Orthostatic Hypotension - HR elevated on ambulation  # Anemia- INR elevated on admission. HGB 11-> 8.3 ( stable)   # A-fib with RVR - controlled at rest.   # HFrEF 45%/ h/o AICD- TTE: EF 45-50% with moderate TR only-- not in fluid overload  # MARIANEN /HAGMA/ h/o nephrolithiasis   # Transaminitis   # Euthyroid SS    Plan   - orthostatic pos due to increase HR--seen by EPS--ICD was interrogated, device functions well. Tachy therapy zones were reprogrammed, so AF with RVR would not receive inappropriate shocks.   - s/p 1 pRBC ,  for symptomatic anemia, hgb stable   -  CT abd pel noncon negative for retroperitoneal bleed (see report for details) diverticiulitis? no abdominal pain. , iron studies normal   - no over sings of bleeding source   - c/w with eliquis for now   - continue metoprolol 100 q8  increased from 75. yesterday had tachycardiac episode with mabulation with hr in 150s.   - patient is on digoxin ( new medication)  -  c/w midodrine 15 q8, wean as tolerated   - Holding on GDMT due to hypotension-> Entresto, Aldactone,   - TSH low, Ft4 normal, t3 low, euthyroid SS, team discussed with endocrine, no meds needed and outpt f/u for repeat TFTs in 4-6 weeks   monitor HR during ambulation     # Gi prophylaxis   - on PPIs    follow up:     monitor BP, re introducing GDMT when bp allows --increased metoprolol as per EP  (HR in 150s yesterday) follow EP  will be discharged once cleared by EP  orthostatics check.
Patient seen at bedside. Changes made within note as well. Discussed with medical team that includes resident(s), medical student(s), nursing staff, case management.     as per initial summary     74-year-old male with past medical history of hypertension, dyslipidemia, mitral valve regurg, A-fib on Coumadin,HFrEF with an EF of 45% in december , and status post AICD presenting for weakness.      # Hypovolemic Shock suspecting due to anemia , improved   # Orthostatic Hypotension - HR elevated on ambulation  # Anemia- INR elevated on admission. HGB 11-> 8.9 ( stable)   # A-fib with RVR - controlled at rest.   # HFrEF 45%/ h/o AICD- TTE: EF 45-50% with moderate TR only-- not in fluid overload  # MARIANNE /HAGMA/ h/o nephrolithiasis   # Transaminitis   # Euthyroid SS    Plan   - orthostatic pos due to increase HR--seen by EPS--ICD was interrogated, device functions well. Tachy therapy zones were reprogrammed, so AF with RVR would not receive inappropriate shocks.   - s/p 1 pRBC ,  for symptomatic anemia, hgb stable   -  CT abd pel noncon negative for retroperitoneal bleed (see report for details) diverticulitis? no abdominal pain. , iron studies normal   - no over sings of bleeding source   - c/w with eliquis for now   - continue metoprolol 100 q8  increased from 75. having ambulatory tachycardiac episodes . follow EP   - patient is on digoxin ( new medication)  -  c/w midodrine 15 q8, wean as tolerated   - Holding on GDMT due to hypotension-> Entresto, Aldactone,   - TSH low, Ft4 normal, t3 low, euthyroid SS, team discussed with endocrine, no meds needed and outpt f/u for repeat TFTs in 4-6 weeks   monitor HR during ambulation     # Gi prophylaxis   - on PPIs    follow up:     monitor BP, re introducing GDMT when bp allows --increased metoprolol as per EP  (ambulatory tachycardia follow EP  orthostatics check.   monitor HR
***My note supersedes any discrepancies that may be above in the resident's note***    74-year-old male with past medical history of hypertension, dyslipidemia, mitral valve regurg, A-fib on Coumadin,HFrEF with an EF of 45% in december , and status post AICD presenting for weakness.      GENERAL: NAD, well-groomed, well-developed  PSYCH: no agitation, baseline mentation  NERVOUS SYSTEM:  Alert & Oriented X3   PULMONARY: Clear to percussion bilaterally; No rales, rhonchi, wheezing, or rubs  CARDIOVASCULAR: Regular rate and rhythm; No murmurs, rubs, or gallops  GI: , Nondistended; Bowel sounds present  EXTREMITIES:   No clubbing, cyanosis, or edema  SKIN: No rashes or lesions    # Hypovolemic Shock due to polypharmacy   # A-fib with RVR   # HFrEF 45%/ h/o AICD  - currently HDS, afebrile and sating well on RA  -  off pressors now  -  c/w midodrine 15 q8, wean as tolerated   - TSH low, Ft4 normal, t3 low, euthyroid SS, discussed with endocrine yesterday, no meds needed and outpt f/u for repeat TFTs in 4-6 weeks   - TTE: EF 45-50% with moderate TR only  - case discussed with pt cardio Dr Cunningham ,  echo unchanged since Nov 2023, cath was ok a few years ago.   - consulted by cardiology , recommendations noted  - metoprolol  25mg --> metop tartrate 50mg qid;   HR goal ~70-80s bpm; HOWEVER, on ambulation goes up to 150s  - holding entresto 2/15  - Aldactone and Farxiga can be started outpatient.   - Replete Mg. Monitor electrolytes. Keep K > 4 and Mg > 2.2.  - recalling EP today    # Orthostatic Hypotension - improved  - check orthostatic VS Q shift   - ACE wrap on LE, abdominal binder   - fall precautions     #Anemia  -gave 1uprbc 2/15  -anemia of chronic disease  -pt with hx of sigmoidectomy, pt is refusing a repeat CT abd/pelvis or to see GI at this time    # MARIANNE /HAGMA/ h/o nephrolithiasis   - resolved  - d/c Calcium Acetate   - monitor BUN/cr and urine output   - keep MAP above 65  -encourage po hydration   - CT noted: No acute abnormality in the abdomen and pelvis. No hydronephrosis, Left-sided nephrolithiasis.  -nephro signed off       # Transaminitis   - no RUQ pain, monitor LFTs   - avoid hepatotoxic meds, BP control and trend LFTs  -improving     # Gi prophylaxis   - on PPIs    #Progress Note Handoff  Pending (specify):  EP recall for uncontrolled HR
IMPRESSION:    MARIANNE, nonoliguric- improved  Euthyroid ss  Hypotension resolved   Transaminitis.    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

## 2025-02-26 LAB
ALBUMIN SERPL ELPH-MCNC: 3.5 G/DL — SIGNIFICANT CHANGE UP (ref 3.5–5.2)
ALP SERPL-CCNC: 66 U/L — SIGNIFICANT CHANGE UP (ref 30–115)
ALT FLD-CCNC: 14 U/L — SIGNIFICANT CHANGE UP (ref 0–41)
ANION GAP SERPL CALC-SCNC: 11 MMOL/L — SIGNIFICANT CHANGE UP (ref 7–14)
AST SERPL-CCNC: 10 U/L — SIGNIFICANT CHANGE UP (ref 0–41)
BILIRUB SERPL-MCNC: 0.9 MG/DL — SIGNIFICANT CHANGE UP (ref 0.2–1.2)
BUN SERPL-MCNC: 16 MG/DL — SIGNIFICANT CHANGE UP (ref 10–20)
CALCIUM SERPL-MCNC: 9 MG/DL — SIGNIFICANT CHANGE UP (ref 8.4–10.4)
CHLORIDE SERPL-SCNC: 106 MMOL/L — SIGNIFICANT CHANGE UP (ref 98–110)
CO2 SERPL-SCNC: 26 MMOL/L — SIGNIFICANT CHANGE UP (ref 17–32)
CREAT SERPL-MCNC: 1 MG/DL — SIGNIFICANT CHANGE UP (ref 0.7–1.5)
EGFR: 79 ML/MIN/1.73M2 — SIGNIFICANT CHANGE UP
GLUCOSE SERPL-MCNC: 87 MG/DL — SIGNIFICANT CHANGE UP (ref 70–99)
HCT VFR BLD CALC: 29.5 % — LOW (ref 42–52)
HGB BLD-MCNC: 9 G/DL — LOW (ref 14–18)
MAGNESIUM SERPL-MCNC: 1.9 MG/DL — SIGNIFICANT CHANGE UP (ref 1.8–2.4)
MCHC RBC-ENTMCNC: 29.9 PG — SIGNIFICANT CHANGE UP (ref 27–31)
MCHC RBC-ENTMCNC: 30.5 G/DL — LOW (ref 32–37)
MCV RBC AUTO: 98 FL — HIGH (ref 80–94)
NRBC BLD AUTO-RTO: 0 /100 WBCS — SIGNIFICANT CHANGE UP (ref 0–0)
PLATELET # BLD AUTO: 316 K/UL — SIGNIFICANT CHANGE UP (ref 130–400)
PMV BLD: 10.5 FL — HIGH (ref 7.4–10.4)
POTASSIUM SERPL-MCNC: 4.7 MMOL/L — SIGNIFICANT CHANGE UP (ref 3.5–5)
POTASSIUM SERPL-SCNC: 4.7 MMOL/L — SIGNIFICANT CHANGE UP (ref 3.5–5)
PROT SERPL-MCNC: 5.6 G/DL — LOW (ref 6–8)
RBC # BLD: 3.01 M/UL — LOW (ref 4.7–6.1)
RBC # FLD: 14.6 % — HIGH (ref 11.5–14.5)
SODIUM SERPL-SCNC: 143 MMOL/L — SIGNIFICANT CHANGE UP (ref 135–146)
WBC # BLD: 5.5 K/UL — SIGNIFICANT CHANGE UP (ref 4.8–10.8)
WBC # FLD AUTO: 5.5 K/UL — SIGNIFICANT CHANGE UP (ref 4.8–10.8)

## 2025-02-26 PROCEDURE — 99232 SBSQ HOSP IP/OBS MODERATE 35: CPT

## 2025-02-26 RX ORDER — MIDODRINE HYDROCHLORIDE 5 MG/1
10 TABLET ORAL EVERY 8 HOURS
Refills: 0 | Status: DISCONTINUED | OUTPATIENT
Start: 2025-02-26 | End: 2025-02-28

## 2025-02-26 RX ADMIN — EZETIMIBE 10 MILLIGRAM(S): 10 TABLET ORAL at 12:09

## 2025-02-26 RX ADMIN — APIXABAN 5 MILLIGRAM(S): 2.5 TABLET, FILM COATED ORAL at 06:06

## 2025-02-26 RX ADMIN — MIDODRINE HYDROCHLORIDE 10 MILLIGRAM(S): 5 TABLET ORAL at 21:25

## 2025-02-26 RX ADMIN — METOPROLOL SUCCINATE 100 MILLIGRAM(S): 50 TABLET, EXTENDED RELEASE ORAL at 06:06

## 2025-02-26 RX ADMIN — MIDODRINE HYDROCHLORIDE 15 MILLIGRAM(S): 5 TABLET ORAL at 06:05

## 2025-02-26 RX ADMIN — Medication 400 MILLIGRAM(S): at 12:09

## 2025-02-26 RX ADMIN — Medication 1 APPLICATION(S): at 13:24

## 2025-02-26 RX ADMIN — Medication 40 MILLIGRAM(S): at 12:09

## 2025-02-26 RX ADMIN — MIDODRINE HYDROCHLORIDE 10 MILLIGRAM(S): 5 TABLET ORAL at 13:18

## 2025-02-26 RX ADMIN — LIDOCAINE HYDROCHLORIDE 1 PATCH: 20 JELLY TOPICAL at 00:10

## 2025-02-26 RX ADMIN — DAPAGLIFLOZIN 10 MILLIGRAM(S): 5 TABLET, FILM COATED ORAL at 12:09

## 2025-02-26 RX ADMIN — METOPROLOL SUCCINATE 100 MILLIGRAM(S): 50 TABLET, EXTENDED RELEASE ORAL at 21:25

## 2025-02-26 RX ADMIN — DIGOXIN 125 MICROGRAM(S): 250 TABLET ORAL at 06:07

## 2025-02-26 RX ADMIN — Medication 400 MILLIGRAM(S): at 07:55

## 2025-02-26 RX ADMIN — METOPROLOL SUCCINATE 100 MILLIGRAM(S): 50 TABLET, EXTENDED RELEASE ORAL at 13:18

## 2025-02-26 RX ADMIN — LIDOCAINE HYDROCHLORIDE 1 PATCH: 20 JELLY TOPICAL at 12:08

## 2025-02-26 RX ADMIN — Medication 400 MILLIGRAM(S): at 17:08

## 2025-02-26 RX ADMIN — APIXABAN 5 MILLIGRAM(S): 2.5 TABLET, FILM COATED ORAL at 17:08

## 2025-02-26 RX ADMIN — LIDOCAINE HYDROCHLORIDE 1 PATCH: 20 JELLY TOPICAL at 19:00

## 2025-02-26 NOTE — PROGRESS NOTE ADULT - SUBJECTIVE AND OBJECTIVE BOX
ALLIE TORRE 74y Male  MRN#: [id number]   Hospital Day: 22d    HPI:    Patient is a 74-year-old male with past medical history of hypertension, dyslipidemia, mitral valve regurg, A-fib on Coumadin,HFrEF with an EF of 45% in december , and status post AICD presenting for weakness. Patient endorses persistent, complaining generalized weakness, fatigue, anorexia and poor p.o. intake for the past 5-6 weeks. Patient also reports a significant weight loss. Patient  states he had recent blood work which showed worsening kidney function, creatinine was 3.1 in  january up from a baseline of 1,2 in december and currently creatinine in 5.3.         · BP   65 mm Hg/45 mm Hg  · Heart Rate  86 /min  · Temp (C)  36.9 Degrees C oral   · SpO2 (%)  98 % on room air      (04 Feb 2025 13:48)      SUBJECTIVE  Patient is a 74y old Male who presents with a chief complaint of AF (24 Feb 2025 10:32)  Currently admitted to medicine with the primary diagnosis of Hypotension    INTERVAL HPI AND OVERNIGHT EVENTS:  Patient was examined and seen at bedside.     OBJECTIVE  PAST MEDICAL & SURGICAL HISTORY    ALLERGIES:  No Known Allergies    MEDICATIONS:  STANDING MEDICATIONS  apixaban 5 milliGRAM(s) Oral two times a day  chlorhexidine 2% Cloths 1 Application(s) Topical daily  dapagliflozin 10 milliGRAM(s) Oral daily  digoxin     Tablet 125 MICROGram(s) Oral daily  ezetimibe 10 milliGRAM(s) Oral daily  lidocaine   4% Patch 1 Patch Transdermal daily  magnesium oxide 400 milliGRAM(s) Oral three times a day with meals  metoprolol tartrate 100 milliGRAM(s) Oral every 8 hours  midodrine 10 milliGRAM(s) Oral every 8 hours  pantoprazole    Tablet 40 milliGRAM(s) Oral daily    PRN MEDICATIONS  acetaminophen     Tablet .. 650 milliGRAM(s) Oral every 6 hours PRN  lidocaine 5% Ointment 1 Application(s) Topical four times a day PRN      VITAL SIGNS: Last 24 Hours  T(C): 36.4 (26 Feb 2025 13:08), Max: 36.8 (25 Feb 2025 20:00)  T(F): 97.6 (26 Feb 2025 13:08), Max: 98.2 (25 Feb 2025 20:00)  HR: 85 (26 Feb 2025 13:08) (68 - 148)  BP: 118/74 (26 Feb 2025 13:08) (118/74 - 128/75)  BP(mean): --  RR: 19 (26 Feb 2025 13:08) (17 - 19)  SpO2: 98% (26 Feb 2025 04:36) (98% - 98%)    LABS:                        9.0    5.50  )-----------( 316      ( 26 Feb 2025 04:32 )             29.5     02-26    143  |  106  |  16  ----------------------------<  87  4.7   |  26  |  1.0    Ca    9.0      26 Feb 2025 04:32  Mg     1.9     02-26    TPro  5.6[L]  /  Alb  3.5  /  TBili  0.9  /  DBili  x   /  AST  10  /  ALT  14  /  AlkPhos  66  02-26      Urinalysis Basic - ( 26 Feb 2025 04:32 )    Color: x / Appearance: x / SG: x / pH: x  Gluc: 87 mg/dL / Ketone: x  / Bili: x / Urobili: x   Blood: x / Protein: x / Nitrite: x   Leuk Esterase: x / RBC: x / WBC x   Sq Epi: x / Non Sq Epi: x / Bacteria: x      PHYSICAL EXAM:  CONSTITUTIONAL: No acute distress  HEAD: Atraumatic, normocephalic  EYES: EOM intact, PERRLA, conjunctiva and sclera clear  ENT: Supple, no masses, no thyromegaly, no bruits, no JVD; moist mucous membranes  PULMONARY: Clear to auscultation bilaterally; no wheezes, rales, or rhonchi  CARDIOVASCULAR: irregular; no murmurs, rubs, or gallops  GASTROINTESTINAL: Soft, non-tender, non-distended; bowel sounds present  MUSCULOSKELETAL: 2+ peripheral pulses; no clubbing, no cyanosis  NEUROLOGY: non-focal    ASSESSMENT & PLAN      74-year-old male with past medical history of hypertension, dyslipidemia, mitral valve regurg, A-fib on Coumadin,HFrEF with an EF of 45% in december , and status post AICD presenting for weakness.      2/26/2025  - per EP current HR elevation on ambulation is likely due to deconditioning  - PT  - DC planning  ------------    # Hypovolemic Shock suspecting due to anemia   # Orthostatic Hypotension - HR elevated on ambulation  # Anemia- INR elevated on admission. HGB 11-> 8.3 ( stable)   # A-fib with RVR - controlled at rest.   # HFrEF 45%/ h/o AICD- TTE: EF 45-50% with moderate TR only-- not in fluid overload  # MARIANNE /HAGMA/ h/o nephrolithiasis   # Transaminitis   # Euthyroid SS  - orthostatic pos due to increase HR--seen by EPS--ICD was interrogated, device functions well. Tachy therapy zones were reprogrammed, so AF with RVR would not receive inappropriate shocks.   - s/p 1 pRBC yesterday,  for symptomatic anemia, Hb 8.3 ---> 8.7 -->8.7  - CT abd pel noncon negative for retroperitoneal bleed, iron studies normal   - no over sings of bleeding source   - c/w with eliquis for now   -  metoprolol 100 q8 ( patient on 300 mg at home-> 200 mg in am and 100 in pm)  - patient is on digoxin ( new medication)  -  c/w midodrine 15 q8, wean as tolerated   - Holding on GDMT due to hypotension-> Entresto, Aldactone, farxiga  - TSH low, Ft4 normal, t3 low, euthyroid SS, team discussed with endocrine, no meds needed and outpt f/u for repeat TFTs in 4-6 weeks  - 2/24 metoprolol 100 TID    # Gi prophylaxis   - on PPIs ALLIE TORRE 74y Male  MRN#: [id number]   Hospital Day: 22d    HPI:    Patient is a 74-year-old male with past medical history of hypertension, dyslipidemia, mitral valve regurg, A-fib on Coumadin,HFrEF with an EF of 45% in december , and status post AICD presenting for weakness. Patient endorses persistent, complaining generalized weakness, fatigue, anorexia and poor p.o. intake for the past 5-6 weeks. Patient also reports a significant weight loss. Patient  states he had recent blood work which showed worsening kidney function, creatinine was 3.1 in  january up from a baseline of 1,2 in december and currently creatinine in 5.3.         · BP   65 mm Hg/45 mm Hg  · Heart Rate  86 /min  · Temp (C)  36.9 Degrees C oral   · SpO2 (%)  98 % on room air      (04 Feb 2025 13:48)      SUBJECTIVE  Patient is a 74y old Male who presents with a chief complaint of AF (24 Feb 2025 10:32)  Currently admitted to medicine with the primary diagnosis of Hypotension    INTERVAL HPI AND OVERNIGHT EVENTS:  Patient was examined and seen at bedside.     OBJECTIVE  PAST MEDICAL & SURGICAL HISTORY    ALLERGIES:  No Known Allergies    MEDICATIONS:  STANDING MEDICATIONS  apixaban 5 milliGRAM(s) Oral two times a day  chlorhexidine 2% Cloths 1 Application(s) Topical daily  dapagliflozin 10 milliGRAM(s) Oral daily  digoxin     Tablet 125 MICROGram(s) Oral daily  ezetimibe 10 milliGRAM(s) Oral daily  lidocaine   4% Patch 1 Patch Transdermal daily  magnesium oxide 400 milliGRAM(s) Oral three times a day with meals  metoprolol tartrate 100 milliGRAM(s) Oral every 8 hours  midodrine 10 milliGRAM(s) Oral every 8 hours  pantoprazole    Tablet 40 milliGRAM(s) Oral daily    PRN MEDICATIONS  acetaminophen     Tablet .. 650 milliGRAM(s) Oral every 6 hours PRN  lidocaine 5% Ointment 1 Application(s) Topical four times a day PRN      VITAL SIGNS: Last 24 Hours  T(C): 36.4 (26 Feb 2025 13:08), Max: 36.8 (25 Feb 2025 20:00)  T(F): 97.6 (26 Feb 2025 13:08), Max: 98.2 (25 Feb 2025 20:00)  HR: 85 (26 Feb 2025 13:08) (68 - 148)  BP: 118/74 (26 Feb 2025 13:08) (118/74 - 128/75)  BP(mean): --  RR: 19 (26 Feb 2025 13:08) (17 - 19)  SpO2: 98% (26 Feb 2025 04:36) (98% - 98%)    LABS:                        9.0    5.50  )-----------( 316      ( 26 Feb 2025 04:32 )             29.5     02-26    143  |  106  |  16  ----------------------------<  87  4.7   |  26  |  1.0    Ca    9.0      26 Feb 2025 04:32  Mg     1.9     02-26    TPro  5.6[L]  /  Alb  3.5  /  TBili  0.9  /  DBili  x   /  AST  10  /  ALT  14  /  AlkPhos  66  02-26      Urinalysis Basic - ( 26 Feb 2025 04:32 )    Color: x / Appearance: x / SG: x / pH: x  Gluc: 87 mg/dL / Ketone: x  / Bili: x / Urobili: x   Blood: x / Protein: x / Nitrite: x   Leuk Esterase: x / RBC: x / WBC x   Sq Epi: x / Non Sq Epi: x / Bacteria: x      PHYSICAL EXAM:  CONSTITUTIONAL: No acute distress  HEAD: Atraumatic, normocephalic  EYES: EOM intact, PERRLA, conjunctiva and sclera clear  ENT: Supple, no masses, no thyromegaly, no bruits, no JVD; moist mucous membranes  PULMONARY: Clear to auscultation bilaterally; no wheezes, rales, or rhonchi  CARDIOVASCULAR: irregular; no murmurs, rubs, or gallops  GASTROINTESTINAL: Soft, non-tender, non-distended; bowel sounds present  MUSCULOSKELETAL: 2+ peripheral pulses; no clubbing, no cyanosis  NEUROLOGY: non-focal    ASSESSMENT & PLAN      74-year-old male with past medical history of hypertension, dyslipidemia, mitral valve regurg, A-fib on Coumadin,HFrEF with an EF of 45% in december , and status post AICD presenting for weakness.      2/26/2025  - per EP current HR elevation on ambulation is likely due to deconditioning  - wean down midodrine  - PT  - DC planning  ------------    # Hypovolemic Shock suspecting due to anemia   # Orthostatic Hypotension - HR elevated on ambulation  # Anemia- INR elevated on admission. HGB 11-> 8.3 ( stable)   # A-fib with RVR - controlled at rest.   # HFrEF 45%/ h/o AICD- TTE: EF 45-50% with moderate TR only-- not in fluid overload  # MARIANNE /HAGMA/ h/o nephrolithiasis   # Transaminitis   # Euthyroid SS  - orthostatic pos due to increase HR--seen by EPS--ICD was interrogated, device functions well. Tachy therapy zones were reprogrammed, so AF with RVR would not receive inappropriate shocks.   - s/p 1 pRBC yesterday,  for symptomatic anemia, Hb 8.3 ---> 8.7 -->8.7  - CT abd pel noncon negative for retroperitoneal bleed, iron studies normal   - no over sings of bleeding source   - c/w with eliquis for now   -  metoprolol 100 q8 ( patient on 300 mg at home-> 200 mg in am and 100 in pm)  - patient is on digoxin ( new medication)  -  c/w midodrine 15 q8, wean as tolerated   - Holding on GDMT due to hypotension-> Entresto, Aldactone, farxiga  - TSH low, Ft4 normal, t3 low, euthyroid SS, team discussed with endocrine, no meds needed and outpt f/u for repeat TFTs in 4-6 weeks  - 2/24 metoprolol 100 TID    # Gi prophylaxis   - on PPIs

## 2025-02-26 NOTE — PROGRESS NOTE ADULT - SUBJECTIVE AND OBJECTIVE BOX
ALLIE TORRE  74y  Male      Patient is a 74y old  Male who presents with a chief complaint of AF (24 Feb 2025 10:32)      INTERVAL HPI/OVERNIGHT EVENTS:  pt seen this am   -first encounter   -tachy eneida events noted - HR in 130s while ambulating   -no discharge today     -Vital Signs Last 24 Hrs  T(C): 36.4 (26 Feb 2025 13:08), Max: 36.8 (25 Feb 2025 20:00)  T(F): 97.6 (26 Feb 2025 13:08), Max: 98.2 (25 Feb 2025 20:00)  HR: 85 (26 Feb 2025 13:08) (68 - 148)  BP: 118/74 (26 Feb 2025 13:08) (118/74 - 128/75)  BP(mean): --  RR: 19 (26 Feb 2025 13:08) (17 - 19)  SpO2: 98% (26 Feb 2025 04:36) (98% - 98%)    Parameters below as of 26 Feb 2025 04:36  Patient On (Oxygen Delivery Method): room air        PHYSICAL EXAM:  GENERAL: Not in distress - speaking in full sentences   HEAD:  Atraumatic, Normocephalic  EYES: EOMI, PERRLA, conjunctiva and sclera clear  NERVOUS SYSTEM:  Alert & Oriented X 4, Good concentration  CHEST/LUNG: Clear air entry   CV/HEART: Regular rate and rhythm; No murmurs, rubs, or gallops  GI/ABDOMEN: Soft abdomen   EXTREMITIES:  moves all extrem     LAB:                        9.0    5.50  )-----------( 316      ( 26 Feb 2025 04:32 )             29.5     02-26    143  |  106  |  16  ----------------------------<  87  4.7   |  26  |  1.0    Ca    9.0      26 Feb 2025 04:32  Mg     1.9     02-26    TPro  5.6[L]  /  Alb  3.5  /  TBili  0.9  /  DBili  x   /  AST  10  /  ALT  14  /  AlkPhos  66  02-26        Daily     Daily   CAPILLARY BLOOD GLUCOSE        Urinalysis Basic - ( 26 Feb 2025 04:32 )    Color: x / Appearance: x / SG: x / pH: x  Gluc: 87 mg/dL / Ketone: x  / Bili: x / Urobili: x   Blood: x / Protein: x / Nitrite: x   Leuk Esterase: x / RBC: x / WBC x   Sq Epi: x / Non Sq Epi: x / Bacteria: x      LIVER FUNCTIONS - ( 26 Feb 2025 04:32 )  Alb: 3.5 g/dL / Pro: 5.6 g/dL / ALK PHOS: 66 U/L / ALT: 14 U/L / AST: 10 U/L / GGT: x               acetaminophen     Tablet .. 650 milliGRAM(s) Oral every 6 hours PRN  apixaban 5 milliGRAM(s) Oral two times a day  chlorhexidine 2% Cloths 1 Application(s) Topical daily  dapagliflozin 10 milliGRAM(s) Oral daily  digoxin     Tablet 125 MICROGram(s) Oral daily  ezetimibe 10 milliGRAM(s) Oral daily  lidocaine   4% Patch 1 Patch Transdermal daily  lidocaine 5% Ointment 1 Application(s) Topical four times a day PRN  magnesium oxide 400 milliGRAM(s) Oral three times a day with meals  metoprolol tartrate 100 milliGRAM(s) Oral every 8 hours  midodrine 10 milliGRAM(s) Oral every 8 hours  pantoprazole    Tablet 40 milliGRAM(s) Oral daily

## 2025-02-26 NOTE — PHARMACOTHERAPY INTERVENTION NOTE - COMMENTS
Patient with HFrEF- was taking Entresto 97/103 mg po BID and spironolactone 25 mg po daily prior to admission. Medications have been held during admission secondary to hypotension. Currently receiving metoprolol tartrate 100 mg po q8h and dapagliflozin 10 mg po daily. Asked if medications will continue to be held on discharge. Per attending patient to follow up outpatient- continue to hold Entresto and spironolactone. Midodrine dose decreased to 10 mg po q8h.     New start medications include:  Apixaban 5 mg po BID (patient was taking warfarin prior to admission)  Midodrine 10 mg po q8h  Digoxin 125 mcg po daily

## 2025-02-26 NOTE — PHARMACOTHERAPY INTERVENTION NOTE - COMMENTS
Patient with a fib, was started on digoxin 125 mcg po daily during this admission. Digoxin level on 2/22 = 0.4 ng/ml; patient did not receive loading dose. Would recommend repeat digoxin level if patient is not planned for discharge today.

## 2025-02-26 NOTE — PROGRESS NOTE ADULT - ASSESSMENT
74-year-old male with past medical history of hypertension, dyslipidemia, mitral valve regurg, A-fib on Coumadin,HFrEF with an EF of 45% in december , and status post AICD presenting for weakness.      # Hypovolemic Shock - underlying acute anemia   # Orthostatic Hypotension  # Anemia- INR elevated on admission. HGB 11-> 8.3 ( stable)   # A-fib with RVR - controlled at rest.   # HFrEF 45%/ h/o AICD- TTE: EF 45-50% with moderate TR only-- not in fluid overload  # MARIANNE /HAGMA/ h/o nephrolithiasis   # Transaminitis   # Euthyroid SS    Plan   - previously positive orthostatic now negative as of 2/25   - seen by EPS--ICD was interrogated, device functions well. Tachy therapy zones were reprogrammed, so AF with RVR would not receive inappropriate shocks.   - s/p 2 units pRBC transfusion this admission - hb 9 this am (CT abd/pelvis negative - no signs of active bleeding and stable h/h)  - Eliquis continued   - metoprolol increased to 100mg q8 ( patient on 300 mg at home-> 200 mg in am and 100 in pm)  - c/w digoxin (new medication)  - midodrine tapered down to 10mg q 8   - medically optimize GDMT in the community (Entresto, Aldactone and farxiga/started inpatient)  - TSH low, Ft4 normal, t3 low, euthyroid SS, team discussed with endocrine, no meds needed and outpt f/u for repeat TFTs in 4-6 weeks     # Gi prophylaxis   - on PPIs    DISPO: not discharge ready today - with Tachy events (also eneida noted this am) - c/w current lopressor dose and monitor on tele   -spoke to patient   -will need close monitoring with cardio/EP in the community     Attending Physician Dr. Jonna Canela  #[id number]

## 2025-02-27 LAB
ALBUMIN SERPL ELPH-MCNC: 3.5 G/DL — SIGNIFICANT CHANGE UP (ref 3.5–5.2)
ALP SERPL-CCNC: 66 U/L — SIGNIFICANT CHANGE UP (ref 30–115)
ALT FLD-CCNC: 14 U/L — SIGNIFICANT CHANGE UP (ref 0–41)
ANION GAP SERPL CALC-SCNC: 11 MMOL/L — SIGNIFICANT CHANGE UP (ref 7–14)
AST SERPL-CCNC: 11 U/L — SIGNIFICANT CHANGE UP (ref 0–41)
BILIRUB SERPL-MCNC: 0.7 MG/DL — SIGNIFICANT CHANGE UP (ref 0.2–1.2)
BUN SERPL-MCNC: 20 MG/DL — SIGNIFICANT CHANGE UP (ref 10–20)
CALCIUM SERPL-MCNC: 8.8 MG/DL — SIGNIFICANT CHANGE UP (ref 8.4–10.4)
CHLORIDE SERPL-SCNC: 106 MMOL/L — SIGNIFICANT CHANGE UP (ref 98–110)
CO2 SERPL-SCNC: 26 MMOL/L — SIGNIFICANT CHANGE UP (ref 17–32)
CREAT SERPL-MCNC: 1 MG/DL — SIGNIFICANT CHANGE UP (ref 0.7–1.5)
DIGOXIN SERPL-MCNC: 0.7 NG/ML — LOW (ref 0.8–2)
EGFR: 79 ML/MIN/1.73M2 — SIGNIFICANT CHANGE UP
GLUCOSE SERPL-MCNC: 82 MG/DL — SIGNIFICANT CHANGE UP (ref 70–99)
HCT VFR BLD CALC: 30.3 % — LOW (ref 42–52)
HGB BLD-MCNC: 9.2 G/DL — LOW (ref 14–18)
MAGNESIUM SERPL-MCNC: 1.8 MG/DL — SIGNIFICANT CHANGE UP (ref 1.8–2.4)
MCHC RBC-ENTMCNC: 30.2 PG — SIGNIFICANT CHANGE UP (ref 27–31)
MCHC RBC-ENTMCNC: 30.4 G/DL — LOW (ref 32–37)
MCV RBC AUTO: 99.3 FL — HIGH (ref 80–94)
NRBC BLD AUTO-RTO: 0 /100 WBCS — SIGNIFICANT CHANGE UP (ref 0–0)
PLATELET # BLD AUTO: 317 K/UL — SIGNIFICANT CHANGE UP (ref 130–400)
PMV BLD: 10.3 FL — SIGNIFICANT CHANGE UP (ref 7.4–10.4)
POTASSIUM SERPL-MCNC: 4.4 MMOL/L — SIGNIFICANT CHANGE UP (ref 3.5–5)
POTASSIUM SERPL-SCNC: 4.4 MMOL/L — SIGNIFICANT CHANGE UP (ref 3.5–5)
PROT SERPL-MCNC: 5.6 G/DL — LOW (ref 6–8)
RBC # BLD: 3.05 M/UL — LOW (ref 4.7–6.1)
RBC # FLD: 14.7 % — HIGH (ref 11.5–14.5)
SODIUM SERPL-SCNC: 143 MMOL/L — SIGNIFICANT CHANGE UP (ref 135–146)
WBC # BLD: 5.54 K/UL — SIGNIFICANT CHANGE UP (ref 4.8–10.8)
WBC # FLD AUTO: 5.54 K/UL — SIGNIFICANT CHANGE UP (ref 4.8–10.8)

## 2025-02-27 PROCEDURE — 99232 SBSQ HOSP IP/OBS MODERATE 35: CPT

## 2025-02-27 RX ORDER — METOPROLOL SUCCINATE 50 MG/1
200 TABLET, EXTENDED RELEASE ORAL EVERY 12 HOURS
Refills: 0 | Status: DISCONTINUED | OUTPATIENT
Start: 2025-02-27 | End: 2025-02-28

## 2025-02-27 RX ADMIN — Medication 400 MILLIGRAM(S): at 17:27

## 2025-02-27 RX ADMIN — Medication 40 MILLIGRAM(S): at 12:20

## 2025-02-27 RX ADMIN — APIXABAN 5 MILLIGRAM(S): 2.5 TABLET, FILM COATED ORAL at 17:27

## 2025-02-27 RX ADMIN — MIDODRINE HYDROCHLORIDE 10 MILLIGRAM(S): 5 TABLET ORAL at 21:56

## 2025-02-27 RX ADMIN — APIXABAN 5 MILLIGRAM(S): 2.5 TABLET, FILM COATED ORAL at 05:54

## 2025-02-27 RX ADMIN — METOPROLOL SUCCINATE 100 MILLIGRAM(S): 50 TABLET, EXTENDED RELEASE ORAL at 05:55

## 2025-02-27 RX ADMIN — Medication 1 APPLICATION(S): at 12:21

## 2025-02-27 RX ADMIN — METOPROLOL SUCCINATE 100 MILLIGRAM(S): 50 TABLET, EXTENDED RELEASE ORAL at 13:11

## 2025-02-27 RX ADMIN — LIDOCAINE HYDROCHLORIDE 1 PATCH: 20 JELLY TOPICAL at 12:22

## 2025-02-27 RX ADMIN — MIDODRINE HYDROCHLORIDE 10 MILLIGRAM(S): 5 TABLET ORAL at 13:11

## 2025-02-27 RX ADMIN — LIDOCAINE HYDROCHLORIDE 1 PATCH: 20 JELLY TOPICAL at 00:02

## 2025-02-27 RX ADMIN — DAPAGLIFLOZIN 10 MILLIGRAM(S): 5 TABLET, FILM COATED ORAL at 12:21

## 2025-02-27 RX ADMIN — EZETIMIBE 10 MILLIGRAM(S): 10 TABLET ORAL at 12:20

## 2025-02-27 RX ADMIN — METOPROLOL SUCCINATE 200 MILLIGRAM(S): 50 TABLET, EXTENDED RELEASE ORAL at 21:56

## 2025-02-27 RX ADMIN — DIGOXIN 125 MICROGRAM(S): 250 TABLET ORAL at 05:55

## 2025-02-27 RX ADMIN — MIDODRINE HYDROCHLORIDE 10 MILLIGRAM(S): 5 TABLET ORAL at 05:54

## 2025-02-27 RX ADMIN — Medication 400 MILLIGRAM(S): at 12:19

## 2025-02-27 NOTE — PHARMACOTHERAPY INTERVENTION NOTE - INTERVENTION TYPE RECOOMEND
Dose Optimization/Non-renal Dose Adjustment
Pharmacokinetics Evaluation
Dose Optimization/Non-renal Dose Adjustment
Pharmacokinetics Evaluation
Dose Optimization/Non-renal Dose Adjustment

## 2025-02-27 NOTE — PHARMACOTHERAPY INTERVENTION NOTE - INTERVENTION CATEGORIES
Med Reconciliation
Therapy
ASP
Therapy
Therapy

## 2025-02-27 NOTE — PROGRESS NOTE ADULT - SUBJECTIVE AND OBJECTIVE BOX
RAPHAEL TORREATORE  74y  Male      Patient is a 74y old  Male who presents with a chief complaint of AF (24 Feb 2025 10:32)      INTERVAL HPI/OVERNIGHT EVENTS:  pt seen this am - with prolonged hospitalization   -second encounter   -tachy eneida events noted - HR in 130s while ambulating and drops to 30s-40s overnight   -no discharge today - discussed with EP, will reset the icd to 50 HR and increase lopressor dose     Vital Signs Last 24 Hrs  T(C): 36.3 (27 Feb 2025 04:00), Max: 36.4 (26 Feb 2025 13:08)  T(F): 97.4 (27 Feb 2025 04:00), Max: 97.6 (26 Feb 2025 13:08)  HR: 67 (27 Feb 2025 04:00) (67 - 85)  BP: 111/70 (27 Feb 2025 04:00) (110/70 - 118/74)  BP(mean): 83 (27 Feb 2025 04:00) (83 - 83)  RR: 18 (27 Feb 2025 04:00) (18 - 19)  SpO2: 95% (27 Feb 2025 04:00) (95% - 96%)    Parameters below as of 26 Feb 2025 22:00  Patient On (Oxygen Delivery Method): room air      PHYSICAL EXAM:  GENERAL: Not in distress - speaking in full sentences   HEAD:  Atraumatic, Normocephalic  EYES: EOMI, PERRLA, conjunctiva and sclera clear  NERVOUS SYSTEM:  Alert & Oriented X 4, Good concentration  CHEST/LUNG: Clear air entry   CV/HEART: Regular rate and rhythm; No murmurs, rubs, or gallops  GI/ABDOMEN: Soft abdomen   EXTREMITIES:  moves all extrem     LAB:                                 9.2    5.54  )-----------( 317      ( 27 Feb 2025 04:32 )             30.3   02-27    143  |  106  |  20  ----------------------------<  82  4.4   |  26  |  1.0    Ca    8.8      27 Feb 2025 04:32  Mg     1.8     02-27    TPro  5.6[L]  /  Alb  3.5  /  TBili  0.7  /  DBili  x   /  AST  11  /  ALT  14  /  AlkPhos  66  02-27          Urinalysis Basic - ( 26 Feb 2025 04:32 )    Color: x / Appearance: x / SG: x / pH: x  Gluc: 87 mg/dL / Ketone: x  / Bili: x / Urobili: x   Blood: x / Protein: x / Nitrite: x   Leuk Esterase: x / RBC: x / WBC x   Sq Epi: x / Non Sq Epi: x / Bacteria: x      LIVER FUNCTIONS - ( 26 Feb 2025 04:32 )  Alb: 3.5 g/dL / Pro: 5.6 g/dL / ALK PHOS: 66 U/L / ALT: 14 U/L / AST: 10 U/L / GGT: x               MEDICATIONS  (STANDING):  apixaban 5 milliGRAM(s) Oral two times a day  chlorhexidine 2% Cloths 1 Application(s) Topical daily  dapagliflozin 10 milliGRAM(s) Oral daily  digoxin     Tablet 125 MICROGram(s) Oral daily  ezetimibe 10 milliGRAM(s) Oral daily  lidocaine   4% Patch 1 Patch Transdermal daily  magnesium oxide 400 milliGRAM(s) Oral three times a day with meals  metoprolol tartrate 100 milliGRAM(s) Oral every 8 hours  midodrine 10 milliGRAM(s) Oral every 8 hours  pantoprazole    Tablet 40 milliGRAM(s) Oral daily    MEDICATIONS  (PRN):  acetaminophen     Tablet .. 650 milliGRAM(s) Oral every 6 hours PRN Mild Pain (1 - 3), Moderate Pain (4 - 6)  lidocaine 5% Ointment 1 Application(s) Topical four times a day PRN pain at [place] area

## 2025-02-27 NOTE — PHARMACOTHERAPY INTERVENTION NOTE - COMMENTS
Digoxin level 2/27 @ 0432= 0.7 ng/ml. Patient with history of afib (goal 0.8- 2 ng/ml) and also HFrEF (goal 0.5-0.9 ng/ml) ok to continue at current dose and monitor outpatient.

## 2025-02-27 NOTE — PROGRESS NOTE ADULT - ASSESSMENT
74-year-old male with past medical history of hypertension, dyslipidemia, mitral valve regurg, A-fib on Coumadin,HFrEF with an EF of 45% in december , and status post AICD presenting for weakness.      # Hypovolemic Shock - underlying acute anemia   # Orthostatic Hypotension  # Anemia- INR elevated on admission. HGB 11-> 8.3 ( stable)   # A-fib with RVR - controlled at rest.   # HFrEF 45%/ h/o AICD- TTE: EF 45-50% with moderate TR only-- not in fluid overload  # MARIANNE /HAGMA/ h/o nephrolithiasis   # Transaminitis   # Euthyroid SS    Plan   - previously positive orthostatic now negative as of 2/25   - seen by EPS--ICD was interrogated, device functions well. Tachy therapy zones were reprogrammed, so AF with RVR would not receive inappropriate shocks.   - s/p 2 units pRBC transfusion this admission - hb 9 this am (CT abd/pelvis negative - no signs of active bleeding and stable h/h)  - Eliquis continued   - current metoprolol dose at 100mg q8 ( patient on 300 mg at home --> 200 mg in am and 100 in pm) - increase to 400mg q daily dose and monitor inpatient (200mg in am and 200mg at night - icd set at 50 HR)  - c/w digoxin (new medication)  - midodrine tapered down to 10mg q 8   - medically optimize GDMT in the community (Entresto, Aldactone and farxiga/started inpatient)  - TSH low, Ft4 normal, t3 low, euthyroid SS, team discussed with endocrine, no meds needed and outpt f/u for repeat TFTs in 4-6 weeks     # Gi prophylaxis   - on PPIs    DISPO: not discharge ready today - with Tachy eneida events - c/w tele monitoring (ICD modified to HR 50)  -spoke to patient   -will need close monitoring with cardio/EP in the community upon discharge     Attending Physician Dr. Jonna Canela  #[id number]

## 2025-02-27 NOTE — PROGRESS NOTE ADULT - SUBJECTIVE AND OBJECTIVE BOX
ALLIE TORRE 74y Male  MRN#: [id number]   Hospital Day: 23d    HPI:    Patient is a 74-year-old male with past medical history of hypertension, dyslipidemia, mitral valve regurg, A-fib on Coumadin,HFrEF with an EF of 45% in december , and status post AICD presenting for weakness. Patient endorses persistent, complaining generalized weakness, fatigue, anorexia and poor p.o. intake for the past 5-6 weeks. Patient also reports a significant weight loss. Patient  states he had recent blood work which showed worsening kidney function, creatinine was 3.1 in  january up from a baseline of 1,2 in december and currently creatinine in 5.3.         · BP   65 mm Hg/45 mm Hg  · Heart Rate  86 /min  · Temp (C)  36.9 Degrees C oral   · SpO2 (%)  98 % on room air      (04 Feb 2025 13:48)      SUBJECTIVE  Patient is a 74y old Male who presents with a chief complaint of AF (24 Feb 2025 10:32)  Currently admitted to medicine with the primary diagnosis of Hypotension      INTERVAL HPI AND OVERNIGHT EVENTS:  Patient was examined and seen at bedside. Noted tele tachy-eneida events.    OBJECTIVE  PAST MEDICAL & SURGICAL HISTORY    ALLERGIES:  No Known Allergies    MEDICATIONS:  STANDING MEDICATIONS  apixaban 5 milliGRAM(s) Oral two times a day  chlorhexidine 2% Cloths 1 Application(s) Topical daily  dapagliflozin 10 milliGRAM(s) Oral daily  digoxin     Tablet 125 MICROGram(s) Oral daily  ezetimibe 10 milliGRAM(s) Oral daily  lidocaine   4% Patch 1 Patch Transdermal daily  magnesium oxide 400 milliGRAM(s) Oral three times a day with meals  metoprolol tartrate 100 milliGRAM(s) Oral every 8 hours  midodrine 10 milliGRAM(s) Oral every 8 hours  pantoprazole    Tablet 40 milliGRAM(s) Oral daily    PRN MEDICATIONS  acetaminophen     Tablet .. 650 milliGRAM(s) Oral every 6 hours PRN  lidocaine 5% Ointment 1 Application(s) Topical four times a day PRN      VITAL SIGNS: Last 24 Hours  T(C): 36.3 (27 Feb 2025 04:00), Max: 36.4 (26 Feb 2025 13:08)  T(F): 97.4 (27 Feb 2025 04:00), Max: 97.6 (26 Feb 2025 13:08)  HR: 67 (27 Feb 2025 04:00) (67 - 85)  BP: 111/70 (27 Feb 2025 04:00) (110/70 - 118/74)  BP(mean): 83 (27 Feb 2025 04:00) (83 - 83)  RR: 18 (27 Feb 2025 04:00) (18 - 19)  SpO2: 95% (27 Feb 2025 04:00) (95% - 96%)    LABS:                        9.2    5.54  )-----------( 317      ( 27 Feb 2025 04:32 )             30.3     02-27    143  |  106  |  20  ----------------------------<  82  4.4   |  26  |  1.0    Ca    8.8      27 Feb 2025 04:32  Mg     1.8     02-27    TPro  5.6[L]  /  Alb  3.5  /  TBili  0.7  /  DBili  x   /  AST  11  /  ALT  14  /  AlkPhos  66  02-27      Urinalysis Basic - ( 27 Feb 2025 04:32 )    Color: x / Appearance: x / SG: x / pH: x  Gluc: 82 mg/dL / Ketone: x  / Bili: x / Urobili: x   Blood: x / Protein: x / Nitrite: x   Leuk Esterase: x / RBC: x / WBC x   Sq Epi: x / Non Sq Epi: x / Bacteria: x      PHYSICAL EXAM:  CONSTITUTIONAL: No acute distress  HEAD: Atraumatic, normocephalic  EYES: EOM intact, PERRLA, conjunctiva and sclera clear  ENT: Supple, no masses, no thyromegaly, no bruits, no JVD; moist mucous membranes  PULMONARY: Clear to auscultation bilaterally; no wheezes, rales, or rhonchi  CARDIOVASCULAR: Regular rate and rhythm; no murmurs, rubs, or gallops  GASTROINTESTINAL: Soft, non-tender, non-distended; bowel sounds present  MUSCULOSKELETAL: 2+ peripheral pulses; no clubbing, no cyanosis, no edema  NEUROLOGY: non-focal    ASSESSMENT & PLAN        74-year-old male with past medical history of hypertension, dyslipidemia, mitral valve regurg, A-fib on Coumadin,HFrEF with an EF of 45% in december , and status post AICD presenting for weakness.      2/27/2025  - f/u EP re tachy-eneida events  - PT  ------------    # Hypovolemic Shock suspecting due to anemia   # Orthostatic Hypotension - HR elevated on ambulation  # Anemia- INR elevated on admission. HGB 11-> 8.3 ( stable)   # A-fib with RVR - controlled at rest.   # HFrEF 45%/ h/o AICD- TTE: EF 45-50% with moderate TR only-- not in fluid overload  # MARIANNE /HAGMA/ h/o nephrolithiasis   # Transaminitis   # Euthyroid SS  - orthostatic pos due to increase HR--seen by EPS--ICD was interrogated, device functions well. Tachy therapy zones were reprogrammed, so AF with RVR would not receive inappropriate shocks.   - s/p 1 pRBC yesterday,  for symptomatic anemia, Hb 8.3 ---> 8.7 -->8.7  - CT abd pel noncon negative for retroperitoneal bleed, iron studies normal   - no over sings of bleeding source   - c/w with eliquis for now   -  metoprolol 100 q8 ( patient on 300 mg at home-> 200 mg in am and 100 in pm)  - patient is on digoxin ( new medication)  -  c/w midodrine 10 q8, wean as tolerated   - Holding on GDMT due to hypotension-> Entresto, Aldactone, farxiga  - TSH low, Ft4 normal, t3 low, euthyroid SS, team discussed with endocrine, no meds needed and outpt f/u for repeat TFTs in 4-6 weeks  - 2/24 metoprolol 100 TID  - f/u EP re tachy-eneida events    # Gi prophylaxis   - on PPIs

## 2025-02-28 VITALS
HEART RATE: 77 BPM | DIASTOLIC BLOOD PRESSURE: 70 MMHG | SYSTOLIC BLOOD PRESSURE: 107 MMHG | TEMPERATURE: 98 F | RESPIRATION RATE: 20 BRPM

## 2025-02-28 LAB
ALBUMIN SERPL ELPH-MCNC: 3.5 G/DL — SIGNIFICANT CHANGE UP (ref 3.5–5.2)
ALP SERPL-CCNC: 68 U/L — SIGNIFICANT CHANGE UP (ref 30–115)
ALT FLD-CCNC: 14 U/L — SIGNIFICANT CHANGE UP (ref 0–41)
ANION GAP SERPL CALC-SCNC: 9 MMOL/L — SIGNIFICANT CHANGE UP (ref 7–14)
AST SERPL-CCNC: 11 U/L — SIGNIFICANT CHANGE UP (ref 0–41)
BILIRUB SERPL-MCNC: 0.6 MG/DL — SIGNIFICANT CHANGE UP (ref 0.2–1.2)
BUN SERPL-MCNC: 19 MG/DL — SIGNIFICANT CHANGE UP (ref 10–20)
CALCIUM SERPL-MCNC: 8.9 MG/DL — SIGNIFICANT CHANGE UP (ref 8.4–10.5)
CHLORIDE SERPL-SCNC: 103 MMOL/L — SIGNIFICANT CHANGE UP (ref 98–110)
CO2 SERPL-SCNC: 27 MMOL/L — SIGNIFICANT CHANGE UP (ref 17–32)
CREAT SERPL-MCNC: 1 MG/DL — SIGNIFICANT CHANGE UP (ref 0.7–1.5)
EGFR: 79 ML/MIN/1.73M2 — SIGNIFICANT CHANGE UP
GLUCOSE SERPL-MCNC: 87 MG/DL — SIGNIFICANT CHANGE UP (ref 70–99)
HCT VFR BLD CALC: 29.3 % — LOW (ref 42–52)
HGB BLD-MCNC: 9.1 G/DL — LOW (ref 14–18)
MAGNESIUM SERPL-MCNC: 1.8 MG/DL — SIGNIFICANT CHANGE UP (ref 1.8–2.4)
MCHC RBC-ENTMCNC: 30.2 PG — SIGNIFICANT CHANGE UP (ref 27–31)
MCHC RBC-ENTMCNC: 31.1 G/DL — LOW (ref 32–37)
MCV RBC AUTO: 97.3 FL — HIGH (ref 80–94)
NRBC BLD AUTO-RTO: 0 /100 WBCS — SIGNIFICANT CHANGE UP (ref 0–0)
PLATELET # BLD AUTO: 349 K/UL — SIGNIFICANT CHANGE UP (ref 130–400)
PMV BLD: 10.5 FL — HIGH (ref 7.4–10.4)
POTASSIUM SERPL-MCNC: 4.5 MMOL/L — SIGNIFICANT CHANGE UP (ref 3.5–5)
POTASSIUM SERPL-SCNC: 4.5 MMOL/L — SIGNIFICANT CHANGE UP (ref 3.5–5)
PROT SERPL-MCNC: 5.6 G/DL — LOW (ref 6–8)
RBC # BLD: 3.01 M/UL — LOW (ref 4.7–6.1)
RBC # FLD: 14.6 % — HIGH (ref 11.5–14.5)
SODIUM SERPL-SCNC: 139 MMOL/L — SIGNIFICANT CHANGE UP (ref 135–146)
WBC # BLD: 5.57 K/UL — SIGNIFICANT CHANGE UP (ref 4.8–10.8)
WBC # FLD AUTO: 5.57 K/UL — SIGNIFICANT CHANGE UP (ref 4.8–10.8)

## 2025-02-28 PROCEDURE — 99239 HOSP IP/OBS DSCHRG MGMT >30: CPT

## 2025-02-28 RX ORDER — METOPROLOL SUCCINATE 50 MG/1
1 TABLET, EXTENDED RELEASE ORAL
Refills: 0 | DISCHARGE

## 2025-02-28 RX ORDER — MIDODRINE HYDROCHLORIDE 5 MG/1
1 TABLET ORAL
Qty: 42 | Refills: 0
Start: 2025-02-28 | End: 2025-03-13

## 2025-02-28 RX ORDER — METOPROLOL SUCCINATE 50 MG/1
2 TABLET, EXTENDED RELEASE ORAL
Qty: 120 | Refills: 1
Start: 2025-02-28 | End: 2025-04-28

## 2025-02-28 RX ORDER — DAPAGLIFLOZIN 5 MG/1
1 TABLET, FILM COATED ORAL
Refills: 0 | DISCHARGE

## 2025-02-28 RX ORDER — EZETIMIBE 10 MG/1
1 TABLET ORAL
Qty: 30 | Refills: 1
Start: 2025-02-28 | End: 2025-04-28

## 2025-02-28 RX ORDER — DIGOXIN 250 UG/1
1 TABLET ORAL
Qty: 30 | Refills: 1
Start: 2025-02-28 | End: 2025-04-28

## 2025-02-28 RX ORDER — APIXABAN 2.5 MG/1
1 TABLET, FILM COATED ORAL
Qty: 60 | Refills: 3
Start: 2025-02-28 | End: 2025-06-27

## 2025-02-28 RX ORDER — ESOMEPRAZOLE MAGNESIUM 40 MG/1
1 CAPSULE, DELAYED RELEASE ORAL
Refills: 0 | DISCHARGE

## 2025-02-28 RX ORDER — MIDODRINE HYDROCHLORIDE 5 MG/1
1 TABLET ORAL
Qty: 0 | Refills: 0 | DISCHARGE
Start: 2025-02-28

## 2025-02-28 RX ORDER — EZETIMIBE 10 MG/1
1 TABLET ORAL
Refills: 0 | DISCHARGE

## 2025-02-28 RX ORDER — DAPAGLIFLOZIN 5 MG/1
1 TABLET, FILM COATED ORAL
Qty: 30 | Refills: 1
Start: 2025-02-28 | End: 2025-04-28

## 2025-02-28 RX ORDER — SACUBITRIL AND VALSARTAN 49; 51 MG/1; MG/1
1 TABLET, FILM COATED ORAL
Refills: 0 | DISCHARGE

## 2025-02-28 RX ORDER — SPIRONOLACTONE 25 MG
1 TABLET ORAL
Refills: 0 | DISCHARGE

## 2025-02-28 RX ADMIN — MIDODRINE HYDROCHLORIDE 10 MILLIGRAM(S): 5 TABLET ORAL at 06:09

## 2025-02-28 RX ADMIN — DAPAGLIFLOZIN 10 MILLIGRAM(S): 5 TABLET, FILM COATED ORAL at 11:56

## 2025-02-28 RX ADMIN — Medication 400 MILLIGRAM(S): at 08:17

## 2025-02-28 RX ADMIN — APIXABAN 5 MILLIGRAM(S): 2.5 TABLET, FILM COATED ORAL at 17:34

## 2025-02-28 RX ADMIN — Medication 1 APPLICATION(S): at 11:57

## 2025-02-28 RX ADMIN — Medication 400 MILLIGRAM(S): at 17:34

## 2025-02-28 RX ADMIN — METOPROLOL SUCCINATE 200 MILLIGRAM(S): 50 TABLET, EXTENDED RELEASE ORAL at 10:15

## 2025-02-28 RX ADMIN — Medication 40 MILLIGRAM(S): at 11:56

## 2025-02-28 RX ADMIN — DIGOXIN 125 MICROGRAM(S): 250 TABLET ORAL at 06:10

## 2025-02-28 RX ADMIN — LIDOCAINE HYDROCHLORIDE 1 PATCH: 20 JELLY TOPICAL at 11:56

## 2025-02-28 RX ADMIN — APIXABAN 5 MILLIGRAM(S): 2.5 TABLET, FILM COATED ORAL at 06:10

## 2025-02-28 RX ADMIN — MIDODRINE HYDROCHLORIDE 10 MILLIGRAM(S): 5 TABLET ORAL at 13:58

## 2025-02-28 RX ADMIN — EZETIMIBE 10 MILLIGRAM(S): 10 TABLET ORAL at 11:56

## 2025-02-28 RX ADMIN — Medication 400 MILLIGRAM(S): at 11:56

## 2025-02-28 NOTE — PROGRESS NOTE ADULT - PROVIDER SPECIALTY LIST ADULT
Critical Care
Critical Care
Electrophysiology
Endocrinology
Hospitalist
Internal Medicine
Nephrology
Critical Care
Hospitalist
Internal Medicine
Nephrology
CCU
Critical Care
Critical Care
Electrophysiology
Hospitalist
Internal Medicine
Nephrology
Critical Care
Critical Care
Hospice
Hospitalist
Infectious Disease
Pulmonology
Critical Care
Hospitalist

## 2025-02-28 NOTE — PROGRESS NOTE ADULT - SUBJECTIVE AND OBJECTIVE BOX
PADMAALLIE AGUILAR  74y  Male      Patient is a 74y old  Male who presents with a chief complaint of AF (24 Feb 2025 10:32)      INTERVAL HPI/OVERNIGHT EVENTS:  pt seen this am - with prolonged hospitalization   -third encounter today - stable for d/c planning today - HR in 70s after ambulation this am   -icd reset to 50 HR and increases lopressor dose to 200mg twice daily   -senior resident will send new meds to [facility] - discussed with CM and will price out Eliquis copay    Vital Signs Last 24 Hrs  T(C): 36.7 (28 Feb 2025 04:39), Max: 36.7 (28 Feb 2025 04:39)  T(F): 98.1 (28 Feb 2025 04:39), Max: 98.1 (28 Feb 2025 04:39)  HR: 78 (28 Feb 2025 12:00) (66 - 94)  BP: 113/62 (28 Feb 2025 10:05) (113/62 - 132/81)  BP(mean): 79 (28 Feb 2025 10:05) (79 - 82)  RR: 22 (28 Feb 2025 12:00) (18 - 22)  SpO2: 96% (28 Feb 2025 10:05) (96% - 96%)    Parameters below as of 28 Feb 2025 12:00  Patient On (Oxygen Delivery Method): room air        PHYSICAL EXAM:  GENERAL: Not in distress - speaking in full sentences   HEAD:  Atraumatic, Normocephalic  EYES: EOMI, PERRLA, conjunctiva and sclera clear  NERVOUS SYSTEM:  Alert & Oriented X 4, Good concentration  CHEST/LUNG: Clear air entry   CV/HEART: Regular rate and rhythm; No murmurs, rubs, or gallops  GI/ABDOMEN: Soft abdomen   EXTREMITIES:  moves all extrem     LAB:                          9.1    5.57  )-----------( 349      ( 28 Feb 2025 04:48 )             29.3                                 02-28    139  |  103  |  19  ----------------------------<  87  4.5   |  27  |  1.0    Ca    8.9      28 Feb 2025 04:48  Mg     1.8     02-28    TPro  5.6[L]  /  Alb  3.5  /  TBili  0.6  /  DBili  x   /  AST  11  /  ALT  14  /  AlkPhos  68  02-28          Urinalysis Basic - ( 26 Feb 2025 04:32 )    Color: x / Appearance: x / SG: x / pH: x  Gluc: 87 mg/dL / Ketone: x  / Bili: x / Urobili: x   Blood: x / Protein: x / Nitrite: x   Leuk Esterase: x / RBC: x / WBC x   Sq Epi: x / Non Sq Epi: x / Bacteria: x      LIVER FUNCTIONS - ( 26 Feb 2025 04:32 )  Alb: 3.5 g/dL / Pro: 5.6 g/dL / ALK PHOS: 66 U/L / ALT: 14 U/L / AST: 10 U/L / GGT: x             MEDICATIONS  (STANDING):  apixaban 5 milliGRAM(s) Oral two times a day  chlorhexidine 2% Cloths 1 Application(s) Topical daily  dapagliflozin 10 milliGRAM(s) Oral daily  digoxin     Tablet 125 MICROGram(s) Oral daily  ezetimibe 10 milliGRAM(s) Oral daily  lidocaine   4% Patch 1 Patch Transdermal daily  magnesium oxide 400 milliGRAM(s) Oral three times a day with meals  metoprolol tartrate 200 milliGRAM(s) Oral every 12 hours  midodrine 10 milliGRAM(s) Oral every 8 hours  pantoprazole    Tablet 40 milliGRAM(s) Oral daily    MEDICATIONS  (PRN):  acetaminophen     Tablet .. 650 milliGRAM(s) Oral every 6 hours PRN Mild Pain (1 - 3), Moderate Pain (4 - 6)  lidocaine 5% Ointment 1 Application(s) Topical four times a day PRN pain at [place] area

## 2025-02-28 NOTE — PROGRESS NOTE ADULT - TIME BILLING
direct patient care and chart review  -coordinated current plan of care with medical staff on MDR  -d/c papers edited
charting, resident teaching rounds, and interdisciplinary planning.
direct patient care and chart review  -coordinated current plan of care with medical staff on MDR
Patient seen at bedside, time spent evaluating and treating the patient's acute illness as well as time spent reviewing labs, radiology, discussing with patient and/or patient's family and discussing the case with a multidisciplinary team.
direct patient care and chart review  -coordinated current plan of care with medical staff on MDR
for coordination of care
time spent on review of labs, imaging studies, old records, obtaining history, personally examining patient, counselling and communicating with patient, entering orders for medications/tests/etc, discussions with other health care providers, documentation in electronic health records, independent interpretation of labs, imaging/procedure results and care coordination.

## 2025-02-28 NOTE — PROGRESS NOTE ADULT - ASSESSMENT
74-year-old male with past medical history of hypertension, dyslipidemia, mitral valve regurg, A-fib on Coumadin,HFrEF with an EF of 45% in december , and status post AICD presenting for weakness.      # Hypovolemic Shock - underlying acute anemia   # Orthostatic Hypotension  # Anemia- INR elevated on admission. HGB 11-> 8.3 ( stable)   # A-fib with RVR - controlled at rest.   # HFrEF 45%/ h/o AICD- TTE: EF 45-50% with moderate TR only-- not in fluid overload  # MARIANNE /HAGMA/ h/o nephrolithiasis   # Transaminitis   # Euthyroid SS    Plan   - previously positive orthostatic now negative as of 2/25   - seen by EPS--ICD was interrogated, device functions well. Tachy therapy zones were reprogrammed, so AF with RVR would not receive inappropriate shocks.   - s/p 2 units pRBC transfusion this admission - hb 9.1 this am (CT abd/pelvis negative - no signs of active bleeding and stable h/h)  - Eliquis continued (CM will price it out for copay)  - lopressor 200mg twice daily  (icd set at 50 HR)  - c/w digoxin (new medication)  - midodrine tapered down to 10mg q 8   - medically optimize GDMT in the community (Entresto, Aldactone and farxiga/started inpatient)  - TSH low, Ft4 normal, t3 low, euthyroid SS, team discussed with endocrine, no meds needed and outpt f/u for repeat TFTs in 4-6 weeks     # Gi prophylaxis   - on PPIs    DISPO: patient ambulated this am and HR stable in 70s - can be discharged home on current rate controlled meds + Eliquis (sent to [facility])  -spoke to patient - answered all questions   -will need close monitoring with cardio/EP in the community upon discharge   -patient wants to follow up with [facility] (from [address] [place], [place] [address], [facility] [id number])    Attending Physician Dr. Jonna Canela  #[id number]

## 2025-03-03 NOTE — CHART NOTE - NSCHARTNOTESELECT_GEN_ALL_CORE
Event Note
Event Note
Non-Clinical Appointment Info/Event Note
Transfer Note
Transfer Note/Event Note
Event Note
RD f/u/Nutrition Services
Transfer/Event Note

## 2025-03-07 DIAGNOSIS — N20.0 CALCULUS OF KIDNEY: ICD-10-CM

## 2025-03-07 DIAGNOSIS — E07.81 SICK-EUTHYROID SYNDROME: ICD-10-CM

## 2025-03-07 DIAGNOSIS — E66.9 OBESITY, UNSPECIFIED: ICD-10-CM

## 2025-03-07 DIAGNOSIS — N18.2 CHRONIC KIDNEY DISEASE, STAGE 2 (MILD): ICD-10-CM

## 2025-03-07 DIAGNOSIS — E78.5 HYPERLIPIDEMIA, UNSPECIFIED: ICD-10-CM

## 2025-03-07 DIAGNOSIS — E87.5 HYPERKALEMIA: ICD-10-CM

## 2025-03-07 DIAGNOSIS — I34.0 NONRHEUMATIC MITRAL (VALVE) INSUFFICIENCY: ICD-10-CM

## 2025-03-07 DIAGNOSIS — R57.1 HYPOVOLEMIC SHOCK: ICD-10-CM

## 2025-03-07 DIAGNOSIS — D63.8 ANEMIA IN OTHER CHRONIC DISEASES CLASSIFIED ELSEWHERE: ICD-10-CM

## 2025-03-07 DIAGNOSIS — R74.01 ELEVATION OF LEVELS OF LIVER TRANSAMINASE LEVELS: ICD-10-CM

## 2025-03-07 DIAGNOSIS — I48.20 CHRONIC ATRIAL FIBRILLATION, UNSPECIFIED: ICD-10-CM

## 2025-03-07 DIAGNOSIS — I07.1 RHEUMATIC TRICUSPID INSUFFICIENCY: ICD-10-CM

## 2025-03-07 DIAGNOSIS — E87.29 OTHER ACIDOSIS: ICD-10-CM

## 2025-03-07 DIAGNOSIS — N17.9 ACUTE KIDNEY FAILURE, UNSPECIFIED: ICD-10-CM

## 2025-03-07 DIAGNOSIS — I50.22 CHRONIC SYSTOLIC (CONGESTIVE) HEART FAILURE: ICD-10-CM

## 2025-03-07 DIAGNOSIS — I42.9 CARDIOMYOPATHY, UNSPECIFIED: ICD-10-CM

## 2025-03-07 DIAGNOSIS — T38.3X5A ADVERSE EFFECT OF INSULIN AND ORAL HYPOGLYCEMIC [ANTIDIABETIC] DRUGS, INITIAL ENCOUNTER: ICD-10-CM

## 2025-03-07 DIAGNOSIS — Z79.01 LONG TERM (CURRENT) USE OF ANTICOAGULANTS: ICD-10-CM
